# Patient Record
Sex: MALE | Race: WHITE | NOT HISPANIC OR LATINO | Employment: FULL TIME | URBAN - METROPOLITAN AREA
[De-identification: names, ages, dates, MRNs, and addresses within clinical notes are randomized per-mention and may not be internally consistent; named-entity substitution may affect disease eponyms.]

---

## 2017-03-30 ENCOUNTER — APPOINTMENT (OUTPATIENT)
Dept: CT IMAGING | Facility: HOSPITAL | Age: 47
End: 2017-03-30

## 2017-03-30 ENCOUNTER — HOSPITAL ENCOUNTER (OUTPATIENT)
Facility: HOSPITAL | Age: 47
Setting detail: OBSERVATION
Discharge: HOME | End: 2017-03-31
Attending: EMERGENCY MEDICINE | Admitting: INTERNAL MEDICINE

## 2017-03-30 DIAGNOSIS — R56.9 SEIZURE (HCC): Primary | ICD-10-CM

## 2017-03-30 DIAGNOSIS — E16.2 HYPOGLYCEMIA: ICD-10-CM

## 2017-03-30 LAB
ALBUMIN SERPL-MCNC: 4.4 G/DL (ref 3.5–5.2)
ALBUMIN/GLOB SERPL: 1.2 G/DL
ALP SERPL-CCNC: 84 U/L (ref 39–117)
ALT SERPL W P-5'-P-CCNC: 132 U/L (ref 1–41)
ANION GAP SERPL CALCULATED.3IONS-SCNC: 22.7 MMOL/L
AST SERPL-CCNC: 91 U/L (ref 1–40)
BASOPHILS # BLD AUTO: 0.02 10*3/MM3 (ref 0–0.2)
BASOPHILS NFR BLD AUTO: 0.2 % (ref 0–1.5)
BILIRUB SERPL-MCNC: 0.2 MG/DL (ref 0.1–1.2)
BILIRUB UR QL STRIP: NEGATIVE
BUN BLD-MCNC: 17 MG/DL (ref 6–20)
BUN/CREAT SERPL: 16.8 (ref 7–25)
CALCIUM SPEC-SCNC: 9.3 MG/DL (ref 8.6–10.5)
CHLORIDE SERPL-SCNC: 101 MMOL/L (ref 98–107)
CLARITY UR: CLEAR
CO2 SERPL-SCNC: 19.3 MMOL/L (ref 22–29)
COLOR UR: YELLOW
CREAT BLD-MCNC: 1.01 MG/DL (ref 0.76–1.27)
DEPRECATED RDW RBC AUTO: 45.8 FL (ref 37–54)
EOSINOPHIL # BLD AUTO: 0.2 10*3/MM3 (ref 0–0.7)
EOSINOPHIL NFR BLD AUTO: 2.3 % (ref 0.3–6.2)
ERYTHROCYTE [DISTWIDTH] IN BLOOD BY AUTOMATED COUNT: 12.8 % (ref 11.5–14.5)
GFR SERPL CREATININE-BSD FRML MDRD: 80 ML/MIN/1.73
GLOBULIN UR ELPH-MCNC: 3.6 GM/DL
GLUCOSE BLD-MCNC: 127 MG/DL (ref 65–99)
GLUCOSE BLDC GLUCOMTR-MCNC: 143 MG/DL (ref 70–130)
GLUCOSE BLDC GLUCOMTR-MCNC: 164 MG/DL (ref 70–130)
GLUCOSE BLDC GLUCOMTR-MCNC: 56 MG/DL (ref 70–130)
GLUCOSE BLDC GLUCOMTR-MCNC: 80 MG/DL (ref 70–130)
GLUCOSE BLDC GLUCOMTR-MCNC: 89 MG/DL (ref 70–130)
GLUCOSE UR STRIP-MCNC: ABNORMAL MG/DL
HCT VFR BLD AUTO: 44.3 % (ref 40.4–52.2)
HGB BLD-MCNC: 14.7 G/DL (ref 13.7–17.6)
HGB UR QL STRIP.AUTO: NEGATIVE
HOLD SPECIMEN: NORMAL
HOLD SPECIMEN: NORMAL
IMM GRANULOCYTES # BLD: 0.02 10*3/MM3 (ref 0–0.03)
IMM GRANULOCYTES NFR BLD: 0.2 % (ref 0–0.5)
KETONES UR QL STRIP: NEGATIVE
LEUKOCYTE ESTERASE UR QL STRIP.AUTO: NEGATIVE
LYMPHOCYTES # BLD AUTO: 2.49 10*3/MM3 (ref 0.9–4.8)
LYMPHOCYTES NFR BLD AUTO: 29 % (ref 19.6–45.3)
MAGNESIUM SERPL-MCNC: 2.6 MG/DL (ref 1.6–2.6)
MCH RBC QN AUTO: 32.7 PG (ref 27–32.7)
MCHC RBC AUTO-ENTMCNC: 33.2 G/DL (ref 32.6–36.4)
MCV RBC AUTO: 98.4 FL (ref 79.8–96.2)
MONOCYTES # BLD AUTO: 0.41 10*3/MM3 (ref 0.2–1.2)
MONOCYTES NFR BLD AUTO: 4.8 % (ref 5–12)
NEUTROPHILS # BLD AUTO: 5.45 10*3/MM3 (ref 1.9–8.1)
NEUTROPHILS NFR BLD AUTO: 63.5 % (ref 42.7–76)
NITRITE UR QL STRIP: NEGATIVE
PH UR STRIP.AUTO: 5.5 [PH] (ref 5–8)
PLATELET # BLD AUTO: 259 10*3/MM3 (ref 140–500)
PMV BLD AUTO: 9.9 FL (ref 6–12)
POTASSIUM BLD-SCNC: 3.8 MMOL/L (ref 3.5–5.2)
PROT SERPL-MCNC: 8 G/DL (ref 6–8.5)
PROT UR QL STRIP: ABNORMAL
RBC # BLD AUTO: 4.5 10*6/MM3 (ref 4.6–6)
SODIUM BLD-SCNC: 143 MMOL/L (ref 136–145)
SP GR UR STRIP: 1.02 (ref 1–1.03)
UROBILINOGEN UR QL STRIP: ABNORMAL
WBC NRBC COR # BLD: 8.59 10*3/MM3 (ref 4.5–10.7)
WHOLE BLOOD HOLD SPECIMEN: NORMAL
WHOLE BLOOD HOLD SPECIMEN: NORMAL

## 2017-03-30 PROCEDURE — 83735 ASSAY OF MAGNESIUM: CPT | Performed by: EMERGENCY MEDICINE

## 2017-03-30 PROCEDURE — 99284 EMERGENCY DEPT VISIT MOD MDM: CPT

## 2017-03-30 PROCEDURE — G0378 HOSPITAL OBSERVATION PER HR: HCPCS

## 2017-03-30 PROCEDURE — 80053 COMPREHEN METABOLIC PANEL: CPT | Performed by: EMERGENCY MEDICINE

## 2017-03-30 PROCEDURE — 93005 ELECTROCARDIOGRAM TRACING: CPT | Performed by: EMERGENCY MEDICINE

## 2017-03-30 PROCEDURE — 85025 COMPLETE CBC W/AUTO DIFF WBC: CPT | Performed by: EMERGENCY MEDICINE

## 2017-03-30 PROCEDURE — 82962 GLUCOSE BLOOD TEST: CPT

## 2017-03-30 PROCEDURE — 81003 URINALYSIS AUTO W/O SCOPE: CPT | Performed by: EMERGENCY MEDICINE

## 2017-03-30 PROCEDURE — 70450 CT HEAD/BRAIN W/O DYE: CPT

## 2017-03-30 PROCEDURE — 96374 THER/PROPH/DIAG INJ IV PUSH: CPT

## 2017-03-30 PROCEDURE — 96376 TX/PRO/DX INJ SAME DRUG ADON: CPT

## 2017-03-30 PROCEDURE — 93010 ELECTROCARDIOGRAM REPORT: CPT | Performed by: INTERNAL MEDICINE

## 2017-03-30 RX ORDER — INSULIN GLARGINE 100 [IU]/ML
38 INJECTION, SOLUTION SUBCUTANEOUS
COMMUNITY
End: 2021-05-12 | Stop reason: CLARIF

## 2017-03-30 RX ORDER — DEXTROSE MONOHYDRATE 25 G/50ML
25 INJECTION, SOLUTION INTRAVENOUS ONCE
Status: COMPLETED | OUTPATIENT
Start: 2017-03-30 | End: 2017-03-30

## 2017-03-30 RX ORDER — LISINOPRIL 40 MG/1
40 TABLET ORAL
Status: DISCONTINUED | OUTPATIENT
Start: 2017-03-31 | End: 2017-03-31 | Stop reason: HOSPADM

## 2017-03-30 RX ORDER — ACETAMINOPHEN 500 MG
500 TABLET ORAL EVERY 6 HOURS PRN
Status: DISCONTINUED | OUTPATIENT
Start: 2017-03-30 | End: 2017-03-31 | Stop reason: HOSPADM

## 2017-03-30 RX ORDER — DEXTROSE MONOHYDRATE 25 G/50ML
INJECTION, SOLUTION INTRAVENOUS
Status: COMPLETED
Start: 2017-03-30 | End: 2017-03-30

## 2017-03-30 RX ORDER — ACETAMINOPHEN 500 MG
1000 TABLET ORAL EVERY 6 HOURS PRN
Status: DISCONTINUED | OUTPATIENT
Start: 2017-03-30 | End: 2017-03-31 | Stop reason: HOSPADM

## 2017-03-30 RX ORDER — TRAZODONE HYDROCHLORIDE 50 MG/1
25 TABLET ORAL NIGHTLY
Status: DISCONTINUED | OUTPATIENT
Start: 2017-03-30 | End: 2017-03-31

## 2017-03-30 RX ORDER — DEXTROSE AND SODIUM CHLORIDE 5; .45 G/100ML; G/100ML
75 INJECTION, SOLUTION INTRAVENOUS CONTINUOUS
Status: DISCONTINUED | OUTPATIENT
Start: 2017-03-30 | End: 2017-03-31

## 2017-03-30 RX ORDER — EZETIMIBE 10 MG/1
10 TABLET ORAL DAILY
COMMUNITY
End: 2018-08-31

## 2017-03-30 RX ORDER — MONTELUKAST SODIUM 10 MG/1
10 TABLET ORAL NIGHTLY
Status: DISCONTINUED | OUTPATIENT
Start: 2017-03-30 | End: 2017-03-31 | Stop reason: HOSPADM

## 2017-03-30 RX ORDER — ESCITALOPRAM OXALATE 20 MG/1
20 TABLET ORAL DAILY
Status: DISCONTINUED | OUTPATIENT
Start: 2017-03-31 | End: 2017-03-31 | Stop reason: HOSPADM

## 2017-03-30 RX ADMIN — ACETAMINOPHEN 1000 MG: 500 TABLET ORAL at 22:00

## 2017-03-30 RX ADMIN — DEXTROSE MONOHYDRATE 25 G: 25 INJECTION, SOLUTION INTRAVENOUS at 17:59

## 2017-03-30 RX ADMIN — DEXTROSE AND SODIUM CHLORIDE 75 ML/HR: 5; 450 INJECTION, SOLUTION INTRAVENOUS at 19:35

## 2017-03-30 RX ADMIN — DEXTROSE MONOHYDRATE 25 G: 25 INJECTION, SOLUTION INTRAVENOUS at 19:09

## 2017-03-31 VITALS
HEART RATE: 88 BPM | WEIGHT: 243.7 LBS | HEIGHT: 74 IN | SYSTOLIC BLOOD PRESSURE: 132 MMHG | BODY MASS INDEX: 31.28 KG/M2 | RESPIRATION RATE: 20 BRPM | OXYGEN SATURATION: 96 % | TEMPERATURE: 98.3 F | DIASTOLIC BLOOD PRESSURE: 82 MMHG

## 2017-03-31 PROBLEM — E16.2 HYPOGLYCEMIA: Status: ACTIVE | Noted: 2017-03-31

## 2017-03-31 LAB
ANION GAP SERPL CALCULATED.3IONS-SCNC: 13.9 MMOL/L
BUN BLD-MCNC: 17 MG/DL (ref 6–20)
BUN/CREAT SERPL: 15.7 (ref 7–25)
CALCIUM SPEC-SCNC: 8.9 MG/DL (ref 8.6–10.5)
CHLORIDE SERPL-SCNC: 102 MMOL/L (ref 98–107)
CO2 SERPL-SCNC: 22.1 MMOL/L (ref 22–29)
CREAT BLD-MCNC: 1.08 MG/DL (ref 0.76–1.27)
DEPRECATED RDW RBC AUTO: 44.1 FL (ref 37–54)
ERYTHROCYTE [DISTWIDTH] IN BLOOD BY AUTOMATED COUNT: 12.8 % (ref 11.5–14.5)
GFR SERPL CREATININE-BSD FRML MDRD: 74 ML/MIN/1.73
GLUCOSE BLD-MCNC: 413 MG/DL (ref 65–99)
GLUCOSE BLDC GLUCOMTR-MCNC: 358 MG/DL (ref 70–130)
GLUCOSE BLDC GLUCOMTR-MCNC: 369 MG/DL (ref 70–130)
HBA1C MFR BLD: 7.39 % (ref 4.8–5.6)
HCT VFR BLD AUTO: 43.2 % (ref 40.4–52.2)
HGB BLD-MCNC: 14.3 G/DL (ref 13.7–17.6)
MCH RBC QN AUTO: 31.8 PG (ref 27–32.7)
MCHC RBC AUTO-ENTMCNC: 33.1 G/DL (ref 32.6–36.4)
MCV RBC AUTO: 96 FL (ref 79.8–96.2)
PLATELET # BLD AUTO: 262 10*3/MM3 (ref 140–500)
PMV BLD AUTO: 9.3 FL (ref 6–12)
POTASSIUM BLD-SCNC: 4.8 MMOL/L (ref 3.5–5.2)
RBC # BLD AUTO: 4.5 10*6/MM3 (ref 4.6–6)
SODIUM BLD-SCNC: 138 MMOL/L (ref 136–145)
WBC NRBC COR # BLD: 11.62 10*3/MM3 (ref 4.5–10.7)

## 2017-03-31 PROCEDURE — 85027 COMPLETE CBC AUTOMATED: CPT | Performed by: INTERNAL MEDICINE

## 2017-03-31 PROCEDURE — 83036 HEMOGLOBIN GLYCOSYLATED A1C: CPT | Performed by: INTERNAL MEDICINE

## 2017-03-31 PROCEDURE — 63710000001 INSULIN ASPART PER 5 UNITS: Performed by: INTERNAL MEDICINE

## 2017-03-31 PROCEDURE — G0378 HOSPITAL OBSERVATION PER HR: HCPCS

## 2017-03-31 PROCEDURE — 82962 GLUCOSE BLOOD TEST: CPT

## 2017-03-31 PROCEDURE — 80048 BASIC METABOLIC PNL TOTAL CA: CPT | Performed by: INTERNAL MEDICINE

## 2017-03-31 RX ORDER — TRAZODONE HYDROCHLORIDE 50 MG/1
50 TABLET ORAL NIGHTLY
Status: DISCONTINUED | OUTPATIENT
Start: 2017-03-31 | End: 2017-03-31

## 2017-03-31 RX ORDER — TRAZODONE HYDROCHLORIDE 50 MG/1
50 TABLET ORAL NIGHTLY
Status: DISCONTINUED | OUTPATIENT
Start: 2017-03-31 | End: 2017-03-31 | Stop reason: HOSPADM

## 2017-03-31 RX ORDER — TRAZODONE HYDROCHLORIDE 50 MG/1
50 TABLET ORAL NIGHTLY
Status: DISCONTINUED | OUTPATIENT
Start: 2017-03-31 | End: 2017-03-31 | Stop reason: SDUPTHER

## 2017-03-31 RX ORDER — INSULIN GLARGINE 100 [IU]/ML
38 INJECTION, SOLUTION SUBCUTANEOUS
Status: DISCONTINUED | OUTPATIENT
Start: 2017-03-31 | End: 2017-03-31 | Stop reason: HOSPADM

## 2017-03-31 RX ORDER — TRAZODONE HYDROCHLORIDE 50 MG/1
25 TABLET ORAL NIGHTLY
Status: DISCONTINUED | OUTPATIENT
Start: 2017-03-31 | End: 2017-03-31 | Stop reason: HOSPADM

## 2017-03-31 RX ORDER — EZETIMIBE 10 MG/1
10 TABLET ORAL DAILY
Status: DISCONTINUED | OUTPATIENT
Start: 2017-03-31 | End: 2017-03-31 | Stop reason: HOSPADM

## 2017-03-31 RX ADMIN — INSULIN ASPART 35 UNITS: 100 INJECTION, SOLUTION INTRAVENOUS; SUBCUTANEOUS at 11:25

## 2017-03-31 RX ADMIN — INSULIN ASPART 35 UNITS: 100 INJECTION, SOLUTION INTRAVENOUS; SUBCUTANEOUS at 08:21

## 2017-03-31 RX ADMIN — MONTELUKAST 10 MG: 10 TABLET, FILM COATED ORAL at 00:50

## 2017-03-31 RX ADMIN — ESCITALOPRAM 20 MG: 20 TABLET, FILM COATED ORAL at 08:21

## 2017-03-31 RX ADMIN — TRAZODONE HYDROCHLORIDE 50 MG: 50 TABLET, FILM COATED ORAL at 00:51

## 2017-03-31 RX ADMIN — LISINOPRIL 40 MG: 40 TABLET ORAL at 08:21

## 2017-04-18 ENCOUNTER — OFFICE VISIT (OUTPATIENT)
Dept: GASTROENTEROLOGY | Facility: CLINIC | Age: 47
End: 2017-04-18

## 2017-04-18 VITALS
DIASTOLIC BLOOD PRESSURE: 84 MMHG | BODY MASS INDEX: 29.9 KG/M2 | SYSTOLIC BLOOD PRESSURE: 130 MMHG | HEIGHT: 74 IN | WEIGHT: 233 LBS

## 2017-04-18 DIAGNOSIS — B18.2 CHRONIC HEPATITIS C WITHOUT HEPATIC COMA (HCC): ICD-10-CM

## 2017-04-18 DIAGNOSIS — R10.13 EPIGASTRIC PAIN: Primary | ICD-10-CM

## 2017-04-18 DIAGNOSIS — K59.00 CONSTIPATION, UNSPECIFIED CONSTIPATION TYPE: ICD-10-CM

## 2017-04-18 PROBLEM — K59.09 CHRONIC CONSTIPATION: Status: ACTIVE | Noted: 2017-04-18

## 2017-04-18 PROBLEM — E11.9 TYPE 2 DIABETES MELLITUS WITHOUT COMPLICATION (HCC): Status: ACTIVE | Noted: 2017-04-18

## 2017-04-18 PROCEDURE — 99204 OFFICE O/P NEW MOD 45 MIN: CPT | Performed by: INTERNAL MEDICINE

## 2017-04-18 RX ORDER — DOCUSATE SODIUM 100 MG/1
100 CAPSULE, LIQUID FILLED ORAL DAILY
COMMUNITY
End: 2018-08-31

## 2017-04-18 RX ORDER — SODIUM CHLORIDE 0.9 % (FLUSH) 0.9 %
1-10 SYRINGE (ML) INJECTION AS NEEDED
Status: CANCELLED | OUTPATIENT
Start: 2017-04-18

## 2017-04-18 RX ORDER — SODIUM CHLORIDE, SODIUM LACTATE, POTASSIUM CHLORIDE, CALCIUM CHLORIDE 600; 310; 30; 20 MG/100ML; MG/100ML; MG/100ML; MG/100ML
30 INJECTION, SOLUTION INTRAVENOUS CONTINUOUS
Status: CANCELLED | OUTPATIENT
Start: 2017-04-18

## 2017-04-18 RX ORDER — MULTIPLE VITAMINS W/ MINERALS TAB 9MG-400MCG
1 TAB ORAL DAILY
COMMUNITY

## 2017-04-18 NOTE — PROGRESS NOTES
Chief Complaint   Patient presents with   • Hepatitis C     Subjective      HPI     Luis Quintana is a 46 y.o. male who presents For evaluation for treatment of his chronic hepatitis C.  Apparently this gentleman was diagnosed with hepatitis C over 10 years ago.  Method of acquisition is unclear.  Pt denies any hx of IVDA or homemade tatoos.  He was born in [place] and migrated to [place] in 1998.  He was previously evaluated for therapy with interferon and ribavarin but declined at the time as he had F0 fibrosis and was under the understanding that new agents would eventually come out.  He was recently seen and evaluated for treatment with Harvoni by Dr. Liriano but for unclear reasons this was never started.  I have reviewed records sent over from Dr. Liriano's office which shows that the patient is genotype 1B.  He did have a HCV fibro-sure some performed which showed F2 3 fibrosis and a fibro-scan which showed F2 fibrosis.  He has not had an abdominal ultrasound in the past.  There is also mention of a history of latent hepatitis B but I do not have any hepatitis B serology available to review.    IN addition to discussion about the patient's hx of HCV, he does mention some other GI related issues.  He reports intermittent epigastric pain over the last 2-3 years.  Pain is located predominately in the mid to right upper abdomen.  Sometimes associated with nausea but no vomiting.  No clear exacerbating or relieving factors.  The patient did undergo an upper GI in 2015 which showed evidence of a sliding hiatal hernia with some gastroesophageal reflux and suspected mild esophagitis.  He has never had upper endoscopic evaluation.  He does report maternal uncle and grandfather with gastric cancer.  He also complains of intermittent constipation with some straining.  He takes regular colace and uses preparation H cream as needed for hemorrhoids.  He has never had lower endoscopic evaluation.      Past Medical History:    Diagnosis Date   • Diabetes mellitus    • Hemorrhoids    • Hyperlipidemia    • Hypertension    • Infectious viral hepatitis     Hep C       Current Outpatient Prescriptions:   •  docusate sodium (COLACE) 100 MG capsule, Take 100 mg by mouth Daily., Disp: , Rfl:   •  escitalopram (LEXAPRO) 20 MG tablet, Take 20 mg by mouth Daily., Disp: , Rfl:   •  ezetimibe (ZETIA) 10 MG tablet, Take 10 mg by mouth Daily., Disp: , Rfl:   •  insulin glargine (LANTUS) 100 UNIT/ML injection, Inject 38 Units under the skin Daily Before Supper., Disp: , Rfl:   •  Insulin Lispro (HUMALOG SC), Inject 35 Units under the skin 3 (Three) Times a Day With Meals. Takes 33-35 units 3 (three) times daily. Dose depends on food intake and exercise., Disp: , Rfl:   •  lisinopril (PRINIVIL,ZESTRIL) 40 MG tablet, Take 40 mg by mouth Daily., Disp: , Rfl:   •  montelukast (SINGULAIR) 10 MG tablet, Take 10 mg by mouth Every Night., Disp: , Rfl:   •  Multiple Vitamins-Minerals (MULTIVITAMIN WITH MINERALS) tablet tablet, Take 1 tablet by mouth Daily., Disp: , Rfl:   •  traZODone (DESYREL) 50 MG tablet, Take 50 mg by mouth Every Night. Takes 25 mg (1/2 tab) to 50 mg (1 tab) nightly at bedtime., Disp: , Rfl:      Allergies   Allergen Reactions   • Penicillins    • Sulfa Antibiotics Rash     Reaction happened when patient was a child. He thinks that he developed a rash but cannot definitively remember.      Social History     Social History   • Marital status: Single     Spouse name: N/A   • Number of children: N/A   • Years of education: N/A     Occupational History   • Not on file.     Social History Main Topics   • Smoking status: Former Smoker     Types: Cigarettes     Quit date: 2014   • Smokeless tobacco: Never Used   • Alcohol use Yes      Comment: occassional    • Drug use: No   • Sexual activity: Defer     Other Topics Concern   • Not on file     Social History Narrative     Family History   Problem Relation Age of Onset   • Arrhythmia Mother       Review of Systems   Gastrointestinal: Positive for abdominal pain and constipation.   All other systems reviewed and are negative.       Objective   Vitals:    04/18/17 1101   BP: 130/84     Physical Exam   Constitutional: He is oriented to person, place, and time. He appears well-developed and well-nourished.   HENT:   Head: Normocephalic and atraumatic.   Mouth/Throat: Oropharynx is clear and moist.   Eyes: Conjunctivae are normal. No scleral icterus.   Neck: Normal range of motion. Neck supple. No thyromegaly present.   Cardiovascular: Normal rate and regular rhythm.  Exam reveals no friction rub.    No murmur heard.  Pulmonary/Chest: Effort normal and breath sounds normal. No respiratory distress. He has no wheezes. He has no rales. He exhibits no tenderness.   Abdominal: Soft. Bowel sounds are normal. He exhibits no distension and no mass. There is no tenderness. There is no rebound and no guarding. No hernia.   Musculoskeletal: He exhibits no edema.   Lymphadenopathy:     He has no cervical adenopathy.     He has no axillary adenopathy.   Neurological: He is alert and oriented to person, place, and time.   Skin: Skin is warm and dry. No rash noted.   Psychiatric: He has a normal mood and affect. His behavior is normal. Judgment and thought content normal.   Vitals reviewed.       Assessment/Plan      Luis was seen today for hepatitis c.    Diagnoses and all orders for this visit:    Epigastric pain  -     Case Request for EGD    Constipation, unspecified constipation type  -     Case Request for colonoscopy  -     Continue colace and fiber supplements    Chronic hepatitis C without hepatic coma  -     Hepatitis B Core Antibody, Total  -     Hepatitis B Surface Antibody  -     Hepatitis B Surface Antigen  -     CBC & Differential  -     Comprehensive Metabolic Panel  -     Protime-INR  -     Hepatitis C RNA, Quantitative, PCR (graph)  -     Ferritin  -     Iron Profile  -     Hepatitis B DNA, Quantitative,  PCR  -     US Liver; Future    We'll arrange for EGD and colonoscopy for evaluation of the patient's upper abdominal pain in his chronic but worsening constipation.  Labs and abdominal u/s as per above for further evaluation the patient's chronic hepatitis C with anticipation of starting the patient on Harvoni - [id number] weeks for treatment naïve genotype 1B HCV.  We discussed treatment with Harvoni in detail including common side effects such as headache and nausea, and more serious side effects such as decompensation of his liver disease.            Lupillo Patino M.D.  [facility], [place], operated by [facility]  [address] [place] [address]  Office: [phone]

## 2017-04-20 LAB
ALBUMIN SERPL-MCNC: 4.5 G/DL (ref 3.5–5.2)
ALBUMIN/GLOB SERPL: 1.4 G/DL
ALP SERPL-CCNC: 85 U/L (ref 39–117)
ALT SERPL-CCNC: 147 U/L (ref 1–41)
AST SERPL-CCNC: 123 U/L (ref 1–40)
BASOPHILS # BLD AUTO: 0.02 10*3/MM3 (ref 0–0.2)
BASOPHILS NFR BLD AUTO: 0.3 % (ref 0–1.5)
BILIRUB SERPL-MCNC: 0.7 MG/DL (ref 0.1–1.2)
BUN SERPL-MCNC: 11 MG/DL (ref 6–20)
BUN/CREAT SERPL: 12 (ref 7–25)
CALCIUM SERPL-MCNC: 9.9 MG/DL (ref 8.6–10.5)
CHLORIDE SERPL-SCNC: 97 MMOL/L (ref 98–107)
CO2 SERPL-SCNC: 27 MMOL/L (ref 22–29)
CREAT SERPL-MCNC: 0.92 MG/DL (ref 0.76–1.27)
EOSINOPHIL # BLD AUTO: 0.22 10*3/MM3 (ref 0–0.7)
EOSINOPHIL NFR BLD AUTO: 3.5 % (ref 0.3–6.2)
ERYTHROCYTE [DISTWIDTH] IN BLOOD BY AUTOMATED COUNT: 13 % (ref 11.5–14.5)
FERRITIN SERPL-MCNC: 375.3 NG/ML (ref 30–400)
GLOBULIN SER CALC-MCNC: 3.3 GM/DL
GLUCOSE SERPL-MCNC: 142 MG/DL (ref 65–99)
HBV CORE AB SERPL QL IA: POSITIVE
HBV DNA SERPL NAA+PROBE-ACNC: NORMAL IU/ML
HBV DNA SERPL NAA+PROBE-LOG IU: NORMAL LOG10IU/ML
HBV SURFACE AB SER QL: REACTIVE
HBV SURFACE AG SERPL QL IA: NEGATIVE
HCT VFR BLD AUTO: 43.6 % (ref 40.4–52.2)
HCV RNA SERPL NAA+PROBE-ACNC: NORMAL IU/ML
HCV RNA SERPL NAA+PROBE-LOG IU: 6.83 LOG10 IU/ML
HGB BLD-MCNC: 14.8 G/DL (ref 13.7–17.6)
IMM GRANULOCYTES # BLD: 0 10*3/MM3 (ref 0–0.03)
IMM GRANULOCYTES NFR BLD: 0 % (ref 0–0.5)
INR PPP: 1.02 (ref 0.9–1.1)
IRON SATN MFR SERPL: 46 % (ref 20–50)
IRON SERPL-MCNC: 190 MCG/DL (ref 59–158)
LYMPHOCYTES # BLD AUTO: 2.21 10*3/MM3 (ref 0.9–4.8)
LYMPHOCYTES NFR BLD AUTO: 35.2 % (ref 19.6–45.3)
MCH RBC QN AUTO: 33 PG (ref 27–32.7)
MCHC RBC AUTO-ENTMCNC: 33.9 G/DL (ref 32.6–36.4)
MCV RBC AUTO: 97.3 FL (ref 79.8–96.2)
MONOCYTES # BLD AUTO: 0.45 10*3/MM3 (ref 0.2–1.2)
MONOCYTES NFR BLD AUTO: 7.2 % (ref 5–12)
NEUTROPHILS # BLD AUTO: 3.38 10*3/MM3 (ref 1.9–8.1)
NEUTROPHILS NFR BLD AUTO: 53.8 % (ref 42.7–76)
PLATELET # BLD AUTO: 250 10*3/MM3 (ref 140–500)
POTASSIUM SERPL-SCNC: 4.2 MMOL/L (ref 3.5–5.2)
PROT SERPL-MCNC: 7.8 G/DL (ref 6–8.5)
PROTHROMBIN TIME: 13 SECONDS (ref 11.7–14.2)
RBC # BLD AUTO: 4.48 10*6/MM3 (ref 4.6–6)
REF LAB TEST REF RANGE: NORMAL
SODIUM SERPL-SCNC: 140 MMOL/L (ref 136–145)
TEST INFORMATION: NORMAL
TIBC SERPL-MCNC: 410 MCG/DL
UIBC SERPL-MCNC: 220 MCG/DL
WBC # BLD AUTO: 6.28 10*3/MM3 (ref 4.5–10.7)

## 2017-04-27 ENCOUNTER — HOSPITAL ENCOUNTER (OUTPATIENT)
Facility: HOSPITAL | Age: 47
Setting detail: HOSPITAL OUTPATIENT SURGERY
Discharge: HOME | End: 2017-04-27
Attending: INTERNAL MEDICINE | Admitting: INTERNAL MEDICINE

## 2017-04-27 ENCOUNTER — HOSPITAL ENCOUNTER (OUTPATIENT)
Dept: ULTRASOUND IMAGING | Facility: HOSPITAL | Age: 47
Discharge: HOME | End: 2017-04-27
Attending: INTERNAL MEDICINE

## 2017-04-27 ENCOUNTER — ANESTHESIA (OUTPATIENT)
Dept: GASTROENTEROLOGY | Facility: HOSPITAL | Age: 47
End: 2017-04-27

## 2017-04-27 ENCOUNTER — ANESTHESIA EVENT (OUTPATIENT)
Dept: GASTROENTEROLOGY | Facility: HOSPITAL | Age: 47
End: 2017-04-27

## 2017-04-27 VITALS
DIASTOLIC BLOOD PRESSURE: 62 MMHG | HEART RATE: 79 BPM | TEMPERATURE: 98.6 F | OXYGEN SATURATION: 97 % | RESPIRATION RATE: 16 BRPM | SYSTOLIC BLOOD PRESSURE: 111 MMHG | BODY MASS INDEX: 28.43 KG/M2 | WEIGHT: 221.44 LBS

## 2017-04-27 DIAGNOSIS — K59.00 CONSTIPATION, UNSPECIFIED CONSTIPATION TYPE: ICD-10-CM

## 2017-04-27 DIAGNOSIS — R10.13 EPIGASTRIC PAIN: ICD-10-CM

## 2017-04-27 LAB — GLUCOSE BLDC GLUCOMTR-MCNC: 192 MG/DL (ref 70–130)

## 2017-04-27 PROCEDURE — 82962 GLUCOSE BLOOD TEST: CPT

## 2017-04-27 PROCEDURE — 45385 COLONOSCOPY W/LESION REMOVAL: CPT | Performed by: INTERNAL MEDICINE

## 2017-04-27 PROCEDURE — 76705 ECHO EXAM OF ABDOMEN: CPT

## 2017-04-27 PROCEDURE — 43239 EGD BIOPSY SINGLE/MULTIPLE: CPT | Performed by: INTERNAL MEDICINE

## 2017-04-27 PROCEDURE — 88305 TISSUE EXAM BY PATHOLOGIST: CPT | Performed by: INTERNAL MEDICINE

## 2017-04-27 PROCEDURE — 25010000002 PROPOFOL 10 MG/ML EMULSION: Performed by: ANESTHESIOLOGY

## 2017-04-27 PROCEDURE — 45380 COLONOSCOPY AND BIOPSY: CPT | Performed by: INTERNAL MEDICINE

## 2017-04-27 PROCEDURE — 88312 SPECIAL STAINS GROUP 1: CPT | Performed by: INTERNAL MEDICINE

## 2017-04-27 DEVICE — CLIPPING DEVICE
Type: IMPLANTABLE DEVICE | Site: CECUM | Status: FUNCTIONAL
Brand: RESOLUTION CLIP

## 2017-04-27 RX ORDER — LIDOCAINE HYDROCHLORIDE 20 MG/ML
INJECTION, SOLUTION INFILTRATION; PERINEURAL AS NEEDED
Status: DISCONTINUED | OUTPATIENT
Start: 2017-04-27 | End: 2017-04-27 | Stop reason: SURG

## 2017-04-27 RX ORDER — PANTOPRAZOLE SODIUM 40 MG/1
40 TABLET, DELAYED RELEASE ORAL DAILY
Qty: 30 TABLET | Refills: 5 | Status: SHIPPED | OUTPATIENT
Start: 2017-04-27 | End: 2017-06-07

## 2017-04-27 RX ORDER — PROPOFOL 10 MG/ML
VIAL (ML) INTRAVENOUS AS NEEDED
Status: DISCONTINUED | OUTPATIENT
Start: 2017-04-27 | End: 2017-04-27 | Stop reason: SURG

## 2017-04-27 RX ORDER — SODIUM CHLORIDE 0.9 % (FLUSH) 0.9 %
1-10 SYRINGE (ML) INJECTION AS NEEDED
Status: DISCONTINUED | OUTPATIENT
Start: 2017-04-27 | End: 2017-04-27 | Stop reason: HOSPADM

## 2017-04-27 RX ORDER — SODIUM CHLORIDE, SODIUM LACTATE, POTASSIUM CHLORIDE, CALCIUM CHLORIDE 600; 310; 30; 20 MG/100ML; MG/100ML; MG/100ML; MG/100ML
30 INJECTION, SOLUTION INTRAVENOUS CONTINUOUS
Status: DISCONTINUED | OUTPATIENT
Start: 2017-04-27 | End: 2017-04-27 | Stop reason: HOSPADM

## 2017-04-27 RX ORDER — PROPOFOL 10 MG/ML
VIAL (ML) INTRAVENOUS CONTINUOUS PRN
Status: DISCONTINUED | OUTPATIENT
Start: 2017-04-27 | End: 2017-04-27 | Stop reason: SURG

## 2017-04-27 RX ADMIN — PROPOFOL 50 MG: 10 INJECTION, EMULSION INTRAVENOUS at 12:54

## 2017-04-27 RX ADMIN — LIDOCAINE HYDROCHLORIDE 40 MG: 20 INJECTION, SOLUTION INFILTRATION; PERINEURAL at 12:54

## 2017-04-27 RX ADMIN — SODIUM CHLORIDE, POTASSIUM CHLORIDE, SODIUM LACTATE AND CALCIUM CHLORIDE 30 ML/HR: 600; 310; 30; 20 INJECTION, SOLUTION INTRAVENOUS at 12:47

## 2017-04-27 RX ADMIN — SODIUM CHLORIDE, POTASSIUM CHLORIDE, SODIUM LACTATE AND CALCIUM CHLORIDE: 600; 310; 30; 20 INJECTION, SOLUTION INTRAVENOUS at 12:54

## 2017-04-27 RX ADMIN — PROPOFOL 160 MCG/KG/MIN: 10 INJECTION, EMULSION INTRAVENOUS at 12:54

## 2017-04-27 NOTE — ANESTHESIA POSTPROCEDURE EVALUATION
Patient: Luis Quintana    Procedure Summary     Date Anesthesia Start Anesthesia Stop Room / Location    04/27/17 1249 1324  [facility] /  [facility]       Procedure Diagnosis Surgeon Provider    ESOPHAGOGASTRODUODENOSCOPY WITH BIOPSY (N/A Esophagus); COLONOSCOPY WITH POLYPECTOMY (N/A ) Epigastric pain; Constipation, unspecified constipation type  (Epigastric pain [R10.13]; Constipation, unspecified constipation type [K59.00]) MD Ambrosio Ivey MD          Anesthesia Type: MAC  Last vitals  /64 (04/27/17 1326)    Temp 37 °C (98.6 °F) (04/27/17 1242)    Pulse 74 (04/27/17 1326)   Resp 16 (04/27/17 1326)    SpO2 99 % (04/27/17 1326)      Post Anesthesia Care and Evaluation    Patient location during evaluation: PACU  Patient participation: complete - patient participated  Level of consciousness: awake and alert  Pain management: adequate  Airway patency: patent  Anesthetic complications: No anesthetic complications    Cardiovascular status: acceptable  Respiratory status: acceptable  Hydration status: acceptable

## 2017-04-27 NOTE — ANESTHESIA PREPROCEDURE EVALUATION
Anesthesia Evaluation     Patient summary reviewed and Nursing notes reviewed   NPO Status: > 8 hours   Airway   Mallampati: II  TM distance: <3 FB  Neck ROM: full  no difficulty expected  Dental - normal exam     Pulmonary - normal exam   (+) a smoker,   Cardiovascular - normal exam    ECG reviewed    (+) hypertension,       Neuro/Psych- negative ROS  GI/Hepatic/Renal/Endo    (+)  hepatitis C, liver disease, diabetes mellitus type 1,     Musculoskeletal (-) negative ROS    Abdominal  - normal exam    Bowel sounds: normal.   Substance History - negative use     OB/GYN negative ob/gyn ROS         Other                                    Anesthesia Plan    ASA 3     MAC   total IV anesthesia  intravenous induction

## 2017-04-28 LAB
CYTO UR: NORMAL
LAB AP CASE REPORT: NORMAL
Lab: NORMAL
PATH REPORT.FINAL DX SPEC: NORMAL
PATH REPORT.GROSS SPEC: NORMAL

## 2017-05-08 ENCOUNTER — TELEPHONE (OUTPATIENT)
Dept: GASTROENTEROLOGY | Facility: CLINIC | Age: 47
End: 2017-05-08

## 2017-05-10 ENCOUNTER — TELEPHONE (OUTPATIENT)
Dept: GASTROENTEROLOGY | Facility: CLINIC | Age: 47
End: 2017-05-10

## 2017-06-07 ENCOUNTER — OFFICE VISIT (OUTPATIENT)
Dept: GASTROENTEROLOGY | Facility: CLINIC | Age: 47
End: 2017-06-07

## 2017-06-07 VITALS
BODY MASS INDEX: 29.18 KG/M2 | SYSTOLIC BLOOD PRESSURE: 112 MMHG | HEIGHT: 74 IN | DIASTOLIC BLOOD PRESSURE: 70 MMHG | TEMPERATURE: 98 F | WEIGHT: 227.4 LBS

## 2017-06-07 DIAGNOSIS — B18.2 CHRONIC HEPATITIS C WITHOUT HEPATIC COMA (HCC): Primary | ICD-10-CM

## 2017-06-07 PROCEDURE — 99213 OFFICE O/P EST LOW 20 MIN: CPT | Performed by: INTERNAL MEDICINE

## 2017-06-07 RX ORDER — HYDROCORTISONE ACETATE 25 MG/1
25 SUPPOSITORY RECTAL NIGHTLY PRN
Qty: 30 SUPPOSITORY | Refills: 1 | OUTPATIENT
Start: 2017-06-07 | End: 2018-08-31

## 2017-06-07 NOTE — PROGRESS NOTES
Chief Complaint   Patient presents with   • Hepatitis     Harvoni follow up     Subjective     HPI  Luis Quintana is a 46 y.o. male who presents Today for follow-up.  The patient is now 4 weeks into therapy with Harvoni and seems to be tolerating this quite well.  He denies any headaches nausea or abdominal pain.  He does continue to complain of some issues with hemorrhoids and he was noted to have both internal and external hemorrhoids on recent colonoscopy.  He is using a daily fiber supplement and stool softener and over-the-counter hemorrhoidal creams with little effect.  He denies any significant rectal bleeding.      Past Medical History:   Diagnosis Date   • Diabetes mellitus    • Hemorrhoids    • Hyperlipidemia    • Hypertension    • Infectious viral hepatitis     Hep C       Social History     Social History   • Marital status: Single     Spouse name: N/A   • Number of children: N/A   • Years of education: N/A     Social History Main Topics   • Smoking status: Former Smoker     Types: Cigarettes     Quit date: 2014   • Smokeless tobacco: Never Used   • Alcohol use Yes      Comment: occassional    • Drug use: No   • Sexual activity: Defer     Other Topics Concern   • None     Social History Narrative         Current Outpatient Prescriptions:   •  docusate sodium (COLACE) 100 MG capsule, Take 100 mg by mouth Daily., Disp: , Rfl:   •  escitalopram (LEXAPRO) 20 MG tablet, Take 20 mg by mouth Daily., Disp: , Rfl:   •  ezetimibe (ZETIA) 10 MG tablet, Take 10 mg by mouth Daily., Disp: , Rfl:   •  insulin glargine (LANTUS) 100 UNIT/ML injection, Inject 38 Units under the skin Daily Before Supper., Disp: , Rfl:   •  Insulin Lispro (HUMALOG SC), Inject 35 Units under the skin 3 (Three) Times a Day With Meals. Takes 33-35 units 3 (three) times daily. Dose depends on food intake and exercise., Disp: , Rfl:   •  lisinopril (PRINIVIL,ZESTRIL) 40 MG tablet, Take 40 mg by mouth Daily., Disp: , Rfl:   •  montelukast  (SINGULAIR) 10 MG tablet, Take 10 mg by mouth Every Night., Disp: , Rfl:   •  Multiple Vitamins-Minerals (MULTIVITAMIN WITH MINERALS) tablet tablet, Take 1 tablet by mouth Daily., Disp: , Rfl:   •  traZODone (DESYREL) 50 MG tablet, Take 50 mg by mouth Every Night. Takes 25 mg (1/2 tab) to 50 mg (1 tab) nightly at bedtime., Disp: , Rfl:   •  hydrocortisone (ANUSOL-HC) 25 MG suppository, Insert 1 suppository into the rectum At Night As Needed for Hemorrhoids (rectal discomfort/bleeding)., Disp: 30 suppository, Rfl: 1    Review of Systems   Constitutional: Negative.    HENT: Negative.    Respiratory: Negative.    Cardiovascular: Negative.    Gastrointestinal: Positive for rectal pain.        Hemorrhoids     Psychiatric/Behavioral: Negative.        Objective   Vitals:    06/07/17 0907   BP: 112/70   Temp: 98 °F (36.7 °C)       Physical Exam   Constitutional: He is oriented to person, place, and time. He appears well-developed and well-nourished.   HENT:   Head: Normocephalic and atraumatic.   Eyes: Pupils are equal, round, and reactive to light.   Abdominal: Soft. Bowel sounds are normal. He exhibits no distension and no mass. There is no tenderness. No hernia.   Neurological: He is alert and oriented to person, place, and time.   Skin: Skin is warm and dry.   Psychiatric: He has a normal mood and affect. His behavior is normal. Judgment and thought content normal.   Vitals reviewed.      Assessment/Plan      Luis was seen today for hepatitis.    Diagnoses and all orders for this visit:    Chronic hepatitis C without hepatic coma  -     CBC & Differential  -     Comprehensive Metabolic Panel  -     Hepatitis C RNA, Quantitative, PCR (graph)    Other orders  -     hydrocortisone (ANUSOL-HC) 25 MG suppository; Insert 1 suppository into the rectum At Night As Needed for Hemorrhoids (rectal discomfort/bleeding).      Check 4 week treatment labs for Harvoni therapy, including HCV viral load  Anusol suppository for  hemorrhoids.  If no improvement consider trial of Vasculera.  May consider referral to colorectal surgery after he has completed his HCV therapy    F/U 4 weeks        Lupillo Patino M.D.  [facility]  [address] [place] [address]  Office: [phone]

## 2017-06-09 ENCOUNTER — TELEPHONE (OUTPATIENT)
Dept: GASTROENTEROLOGY | Facility: CLINIC | Age: 47
End: 2017-06-09

## 2017-06-09 DIAGNOSIS — B18.2 HEP C W/O COMA, CHRONIC (HCC): Primary | ICD-10-CM

## 2017-06-09 LAB
ALBUMIN SERPL-MCNC: 4.7 G/DL (ref 3.5–5.2)
ALBUMIN/GLOB SERPL: 1.6 G/DL
ALP SERPL-CCNC: 68 U/L (ref 39–117)
ALT SERPL-CCNC: 22 U/L (ref 1–41)
AST SERPL-CCNC: 21 U/L (ref 1–40)
BASOPHILS # BLD AUTO: 0.02 10*3/MM3 (ref 0–0.2)
BASOPHILS NFR BLD AUTO: 0.4 % (ref 0–1.5)
BILIRUB SERPL-MCNC: 0.6 MG/DL (ref 0.1–1.2)
BUN SERPL-MCNC: 14 MG/DL (ref 6–20)
BUN/CREAT SERPL: 13.6 (ref 7–25)
CALCIUM SERPL-MCNC: 9.5 MG/DL (ref 8.6–10.5)
CHLORIDE SERPL-SCNC: 97 MMOL/L (ref 98–107)
CO2 SERPL-SCNC: 26.9 MMOL/L (ref 22–29)
CREAT SERPL-MCNC: 1.03 MG/DL (ref 0.76–1.27)
EOSINOPHIL # BLD AUTO: 0.25 10*3/MM3 (ref 0–0.7)
EOSINOPHIL NFR BLD AUTO: 4.5 % (ref 0.3–6.2)
ERYTHROCYTE [DISTWIDTH] IN BLOOD BY AUTOMATED COUNT: 12.5 % (ref 11.5–14.5)
GLOBULIN SER CALC-MCNC: 2.9 GM/DL
GLUCOSE SERPL-MCNC: 331 MG/DL (ref 65–99)
HCT VFR BLD AUTO: 44.7 % (ref 40.4–52.2)
HCV RNA SERPL NAA+PROBE-ACNC: <15 IU/ML
HGB BLD-MCNC: 14.7 G/DL (ref 13.7–17.6)
IMM GRANULOCYTES # BLD: 0 10*3/MM3 (ref 0–0.03)
IMM GRANULOCYTES NFR BLD: 0 % (ref 0–0.5)
LYMPHOCYTES # BLD AUTO: 1.96 10*3/MM3 (ref 0.9–4.8)
LYMPHOCYTES NFR BLD AUTO: 35.4 % (ref 19.6–45.3)
MCH RBC QN AUTO: 32.7 PG (ref 27–32.7)
MCHC RBC AUTO-ENTMCNC: 32.9 G/DL (ref 32.6–36.4)
MCV RBC AUTO: 99.6 FL (ref 79.8–96.2)
MONOCYTES # BLD AUTO: 0.43 10*3/MM3 (ref 0.2–1.2)
MONOCYTES NFR BLD AUTO: 7.8 % (ref 5–12)
NEUTROPHILS # BLD AUTO: 2.88 10*3/MM3 (ref 1.9–8.1)
NEUTROPHILS NFR BLD AUTO: 51.9 % (ref 42.7–76)
PLATELET # BLD AUTO: 244 10*3/MM3 (ref 140–500)
POTASSIUM SERPL-SCNC: 5.3 MMOL/L (ref 3.5–5.2)
PROT SERPL-MCNC: 7.6 G/DL (ref 6–8.5)
RBC # BLD AUTO: 4.49 10*6/MM3 (ref 4.6–6)
SODIUM SERPL-SCNC: 137 MMOL/L (ref 136–145)
TEST INFORMATION: NORMAL
WBC # BLD AUTO: 5.54 10*3/MM3 (ref 4.5–10.7)

## 2017-06-09 NOTE — TELEPHONE ENCOUNTER
----- Message from Lupillo Patino MD sent at 6/9/2017 10:55 AM EDT -----  Results communicated to patient.  HCV level essentially undetectable  Repeat CBC and CMP in 4 weeks

## 2017-07-07 ENCOUNTER — OFFICE VISIT (OUTPATIENT)
Dept: GASTROENTEROLOGY | Facility: CLINIC | Age: 47
End: 2017-07-07

## 2017-07-07 VITALS
HEIGHT: 73 IN | WEIGHT: 227.2 LBS | TEMPERATURE: 97.4 F | BODY MASS INDEX: 30.11 KG/M2 | SYSTOLIC BLOOD PRESSURE: 122 MMHG | DIASTOLIC BLOOD PRESSURE: 74 MMHG

## 2017-07-07 DIAGNOSIS — K64.9 HEMORRHOIDS, UNSPECIFIED HEMORRHOID TYPE: ICD-10-CM

## 2017-07-07 DIAGNOSIS — B18.2 CHRONIC HEPATITIS C WITHOUT HEPATIC COMA (HCC): Primary | ICD-10-CM

## 2017-07-07 DIAGNOSIS — K59.09 CHRONIC CONSTIPATION: ICD-10-CM

## 2017-07-07 LAB
ALBUMIN SERPL-MCNC: 4.6 G/DL (ref 3.5–5.2)
ALBUMIN/GLOB SERPL: 1.5 G/DL
ALP SERPL-CCNC: 88 U/L (ref 39–117)
ALT SERPL-CCNC: 24 U/L (ref 1–41)
AST SERPL-CCNC: 21 U/L (ref 1–40)
BASOPHILS # BLD AUTO: 0.02 10*3/MM3 (ref 0–0.2)
BASOPHILS NFR BLD AUTO: 0.2 % (ref 0–1.5)
BILIRUB SERPL-MCNC: <0.2 MG/DL (ref 0.1–1.2)
BUN SERPL-MCNC: 17 MG/DL (ref 6–20)
BUN/CREAT SERPL: 16.8 (ref 7–25)
CALCIUM SERPL-MCNC: 10.4 MG/DL (ref 8.6–10.5)
CHLORIDE SERPL-SCNC: 100 MMOL/L (ref 98–107)
CO2 SERPL-SCNC: 29 MMOL/L (ref 22–29)
CREAT SERPL-MCNC: 1.01 MG/DL (ref 0.76–1.27)
EOSINOPHIL # BLD AUTO: 0.5 10*3/MM3 (ref 0–0.7)
EOSINOPHIL NFR BLD AUTO: 6.1 % (ref 0.3–6.2)
ERYTHROCYTE [DISTWIDTH] IN BLOOD BY AUTOMATED COUNT: 13.3 % (ref 11.5–14.5)
GLOBULIN SER CALC-MCNC: 3.1 GM/DL
GLUCOSE SERPL-MCNC: 148 MG/DL (ref 65–99)
HCT VFR BLD AUTO: 43.4 % (ref 40.4–52.2)
HGB BLD-MCNC: 14.7 G/DL (ref 13.7–17.6)
IMM GRANULOCYTES # BLD: 0 10*3/MM3 (ref 0–0.03)
IMM GRANULOCYTES NFR BLD: 0 % (ref 0–0.5)
LYMPHOCYTES # BLD AUTO: 3.88 10*3/MM3 (ref 0.9–4.8)
LYMPHOCYTES NFR BLD AUTO: 47.5 % (ref 19.6–45.3)
MCH RBC QN AUTO: 33 PG (ref 27–32.7)
MCHC RBC AUTO-ENTMCNC: 33.9 G/DL (ref 32.6–36.4)
MCV RBC AUTO: 97.3 FL (ref 79.8–96.2)
MONOCYTES # BLD AUTO: 0.59 10*3/MM3 (ref 0.2–1.2)
MONOCYTES NFR BLD AUTO: 7.2 % (ref 5–12)
NEUTROPHILS # BLD AUTO: 3.17 10*3/MM3 (ref 1.9–8.1)
NEUTROPHILS NFR BLD AUTO: 39 % (ref 42.7–76)
PLATELET # BLD AUTO: 316 10*3/MM3 (ref 140–500)
POTASSIUM SERPL-SCNC: 5.6 MMOL/L (ref 3.5–5.2)
PROT SERPL-MCNC: 7.7 G/DL (ref 6–8.5)
RBC # BLD AUTO: 4.46 10*6/MM3 (ref 4.6–6)
SODIUM SERPL-SCNC: 142 MMOL/L (ref 136–145)
WBC # BLD AUTO: 8.16 10*3/MM3 (ref 4.5–10.7)

## 2017-07-07 PROCEDURE — 99213 OFFICE O/P EST LOW 20 MIN: CPT | Performed by: INTERNAL MEDICINE

## 2017-07-07 NOTE — PROGRESS NOTES
Chief Complaint   Patient presents with   • Follow-up     Subjective     HPI     Luis Quintana is a 46 y.o. male who presents today for follow-up of his hepatitis C.  He has just started his last 4 weeks of therapy.  He continues to tolerate the Harvoni quite well with no real side effects.  He tells me his hemorrhoids have improved with the use of the Anusol suppositories.    Past Medical History:   Diagnosis Date   • Diabetes mellitus    • Hemorrhoids    • Hyperlipidemia    • Hypertension    • Infectious viral hepatitis     Hep C       Social History     Social History   • Marital status: Single     Spouse name: N/A   • Number of children: N/A   • Years of education: N/A     Social History Main Topics   • Smoking status: Former Smoker     Types: Cigarettes     Quit date: 2014   • Smokeless tobacco: Never Used   • Alcohol use Yes      Comment: occassional    • Drug use: No   • Sexual activity: Defer     Other Topics Concern   • None     Social History Narrative         Current Outpatient Prescriptions:   •  docusate sodium (COLACE) 100 MG capsule, Take 100 mg by mouth Daily., Disp: , Rfl:   •  escitalopram (LEXAPRO) 20 MG tablet, Take 20 mg by mouth Daily., Disp: , Rfl:   •  ezetimibe (ZETIA) 10 MG tablet, Take 10 mg by mouth Daily., Disp: , Rfl:   •  hydrocortisone (ANUSOL-HC) 25 MG suppository, Insert 1 suppository into the rectum At Night As Needed for Hemorrhoids (rectal discomfort/bleeding)., Disp: 30 suppository, Rfl: 1  •  insulin glargine (LANTUS) 100 UNIT/ML injection, Inject 38 Units under the skin Daily Before Supper., Disp: , Rfl:   •  Insulin Lispro (HUMALOG SC), Inject 35 Units under the skin 3 (Three) Times a Day With Meals. Takes 33-35 units 3 (three) times daily. Dose depends on food intake and exercise., Disp: , Rfl:   •  lisinopril (PRINIVIL,ZESTRIL) 40 MG tablet, Take 40 mg by mouth Daily., Disp: , Rfl:   •  montelukast (SINGULAIR) 10 MG tablet, Take 10 mg by mouth Every Night., Disp: , Rfl:    •  Multiple Vitamins-Minerals (MULTIVITAMIN WITH MINERALS) tablet tablet, Take 1 tablet by mouth Daily., Disp: , Rfl:   •  traZODone (DESYREL) 50 MG tablet, Take 50 mg by mouth Every Night. Takes 25 mg (1/2 tab) to 50 mg (1 tab) nightly at bedtime., Disp: , Rfl:     Review of Systems   Constitutional: Negative.    Cardiovascular: Negative.    Gastrointestinal: Positive for constipation.     Objective   Vitals:    07/07/17 0947   BP: 122/74   Temp: 97.4 °F (36.3 °C)       Physical Exam   Constitutional: He is oriented to person, place, and time.   Eyes: No scleral icterus.   Abdominal: Soft. Bowel sounds are normal. He exhibits no distension. There is no tenderness.   Neurological: He is alert and oriented to person, place, and time.   Vitals reviewed.      Assessment/Plan   Assessment:     1. Chronic hepatitis C without hepatic coma    2. Chronic constipation    3. Hemorrhoids, unspecified hemorrhoid type      Plan:   Check CBC and CMP today  Continue as needed anusol supp and daily stool softener    Return in about 5 weeks (around 8/11/2017).  Will obtain end of treatment viral load at that time.         Lupillo Patino M.D.  [facility]  [address] [place] [address]  Office: [phone]

## 2017-07-23 ENCOUNTER — HOSPITAL ENCOUNTER (EMERGENCY)
Facility: HOSPITAL | Age: 47
End: 2017-07-23

## 2017-07-24 ENCOUNTER — TELEPHONE (OUTPATIENT)
Dept: GASTROENTEROLOGY | Facility: CLINIC | Age: 47
End: 2017-07-24

## 2017-07-24 DIAGNOSIS — E87.5 HYPERKALEMIA: Primary | ICD-10-CM

## 2017-07-24 DIAGNOSIS — E87.6 HYPOKALEMIA: ICD-10-CM

## 2017-07-24 NOTE — TELEPHONE ENCOUNTER
Called pt and advised that per Dr Patino: his labs showed his potassium was a little high so MD recommends to repeat the BMP to check that level. Pt verb understanding. Appt made for tomorrow at 3 PM. Lab order placed.

## 2017-07-24 NOTE — TELEPHONE ENCOUNTER
----- Message from Lupillo Patino MD sent at 7/24/2017  7:40 AM EDT -----  Please have pt come in for repeat BMP - K+ is a little high

## 2017-07-25 ENCOUNTER — HOSPITAL ENCOUNTER (OUTPATIENT)
Dept: GENERAL RADIOLOGY | Facility: HOSPITAL | Age: 47
Discharge: HOME | End: 2017-07-25
Attending: INTERNAL MEDICINE | Admitting: INTERNAL MEDICINE

## 2017-07-25 DIAGNOSIS — R06.02 SOB (SHORTNESS OF BREATH): ICD-10-CM

## 2017-07-25 PROCEDURE — 71020 HC CHEST PA AND LATERAL: CPT

## 2017-07-26 ENCOUNTER — TELEPHONE (OUTPATIENT)
Dept: GASTROENTEROLOGY | Facility: CLINIC | Age: 47
End: 2017-07-26

## 2017-07-26 LAB
BUN SERPL-MCNC: 18 MG/DL (ref 6–20)
BUN/CREAT SERPL: 16.1 (ref 7–25)
CALCIUM SERPL-MCNC: 9.6 MG/DL (ref 8.6–10.5)
CHLORIDE SERPL-SCNC: 101 MMOL/L (ref 98–107)
CO2 SERPL-SCNC: 27.4 MMOL/L (ref 22–29)
CREAT SERPL-MCNC: 1.12 MG/DL (ref 0.76–1.27)
GLUCOSE SERPL-MCNC: 61 MG/DL (ref 65–99)
POTASSIUM SERPL-SCNC: 5.4 MMOL/L (ref 3.5–5.2)
SODIUM SERPL-SCNC: 142 MMOL/L (ref 136–145)

## 2017-07-26 NOTE — TELEPHONE ENCOUNTER
----- Message from Lupillo Patino MD sent at 7/26/2017 12:31 PM EDT -----  Potassium still just a little elevated.  He should f/u with PCP - ? If this could be related to one of his BP medications.

## 2017-07-26 NOTE — TELEPHONE ENCOUNTER
Called pt and advised that per Dr Patino his repeat labs show his potassium is still a little elevated. Advised that MD recommends for him to follow up with his PMD as this could possibly be related to one of his BP meds. Pt verb understanding.

## 2017-07-26 NOTE — TELEPHONE ENCOUNTER
Potassium still just a little elevated.  He should f/u with PCP - ? If this could be related to one of his BP medications.

## 2017-09-15 ENCOUNTER — TELEPHONE (OUTPATIENT)
Dept: GASTROENTEROLOGY | Facility: CLINIC | Age: 47
End: 2017-09-15

## 2017-09-15 DIAGNOSIS — B18.2 CHRONIC HEPATITIS C WITHOUT HEPATIC COMA (HCC): Primary | ICD-10-CM

## 2017-09-15 NOTE — TELEPHONE ENCOUNTER
Patient walked into office questioned if he could have his lab work drawn before his appt on Monday. Orders placed for CBC, CMP and HCV and lab work drawn. Patient states he will keep his appointment with Dr. Patino on Monday.

## 2017-09-18 ENCOUNTER — OFFICE VISIT (OUTPATIENT)
Dept: GASTROENTEROLOGY | Facility: CLINIC | Age: 47
End: 2017-09-18

## 2017-09-18 VITALS
WEIGHT: 216.6 LBS | DIASTOLIC BLOOD PRESSURE: 72 MMHG | TEMPERATURE: 98.7 F | BODY MASS INDEX: 27.8 KG/M2 | SYSTOLIC BLOOD PRESSURE: 128 MMHG | HEIGHT: 74 IN

## 2017-09-18 DIAGNOSIS — B18.2 CHRONIC HEPATITIS C WITHOUT HEPATIC COMA (HCC): Primary | ICD-10-CM

## 2017-09-18 DIAGNOSIS — Z86.19 HISTORY OF HEPATITIS C VIRUS INFECTION: ICD-10-CM

## 2017-09-18 LAB
ALBUMIN SERPL-MCNC: 4.7 G/DL (ref 3.5–5.2)
ALBUMIN/GLOB SERPL: 1.6 G/DL
ALP SERPL-CCNC: 70 U/L (ref 39–117)
ALT SERPL-CCNC: 19 U/L (ref 1–41)
AST SERPL-CCNC: 19 U/L (ref 1–40)
BILIRUB SERPL-MCNC: 0.4 MG/DL (ref 0.1–1.2)
BUN SERPL-MCNC: 14 MG/DL (ref 6–20)
BUN/CREAT SERPL: 14.4 (ref 7–25)
CALCIUM SERPL-MCNC: 10.4 MG/DL (ref 8.6–10.5)
CHLORIDE SERPL-SCNC: 101 MMOL/L (ref 98–107)
CO2 SERPL-SCNC: 30.5 MMOL/L (ref 22–29)
CREAT SERPL-MCNC: 0.97 MG/DL (ref 0.76–1.27)
ERYTHROCYTE [DISTWIDTH] IN BLOOD BY AUTOMATED COUNT: 13.2 % (ref 11.5–14.5)
GLOBULIN SER CALC-MCNC: 2.9 GM/DL
GLUCOSE SERPL-MCNC: 128 MG/DL (ref 65–99)
HCT VFR BLD AUTO: 43.3 % (ref 40.4–52.2)
HCV RNA SERPL NAA+PROBE-ACNC: NORMAL IU/ML
HGB BLD-MCNC: 14.1 G/DL (ref 13.7–17.6)
MCH RBC QN AUTO: 32.7 PG (ref 27–32.7)
MCHC RBC AUTO-ENTMCNC: 32.6 G/DL (ref 32.6–36.4)
MCV RBC AUTO: 100.5 FL (ref 79.8–96.2)
PLATELET # BLD AUTO: 312 10*3/MM3 (ref 140–500)
POTASSIUM SERPL-SCNC: 5.3 MMOL/L (ref 3.5–5.2)
PROT SERPL-MCNC: 7.6 G/DL (ref 6–8.5)
RBC # BLD AUTO: 4.31 10*6/MM3 (ref 4.6–6)
SODIUM SERPL-SCNC: 144 MMOL/L (ref 136–145)
TEST INFORMATION: NORMAL
WBC # BLD AUTO: 6.75 10*3/MM3 (ref 4.5–10.7)

## 2017-09-18 PROCEDURE — 99212 OFFICE O/P EST SF 10 MIN: CPT | Performed by: INTERNAL MEDICINE

## 2017-09-18 NOTE — PROGRESS NOTES
Chief Complaint   Patient presents with   • Hepatic Disease     Subjective     HPI     Luis Quintana is a 47 y.o. male who presents today for follow-up.  Patient has now completed his 12 week course of Harvoni and I am happy to report that his end of treatment HCV viral load is undetectable.  He is obviously extremely pleased to hear these results.  He is currently without GI complaints.      Past Medical History:   Diagnosis Date   • Diabetes mellitus    • Hemorrhoids    • Hyperlipidemia    • Hypertension    • Infectious viral hepatitis     Hep C       Social History     Social History   • Marital status: Single     Spouse name: N/A   • Number of children: N/A   • Years of education: N/A     Social History Main Topics   • Smoking status: Former Smoker     Types: Cigarettes     Quit date: 2014   • Smokeless tobacco: Never Used   • Alcohol use Yes      Comment: occassional    • Drug use: No   • Sexual activity: Defer     Other Topics Concern   • None     Social History Narrative         Current Outpatient Prescriptions:   •  escitalopram (LEXAPRO) 20 MG tablet, Take 20 mg by mouth Daily., Disp: , Rfl:   •  ezetimibe (ZETIA) 10 MG tablet, Take 10 mg by mouth Daily., Disp: , Rfl:   •  insulin glargine (LANTUS) 100 UNIT/ML injection, Inject 38 Units under the skin Daily Before Supper., Disp: , Rfl:   •  Insulin Lispro (HUMALOG SC), Inject 35 Units under the skin 3 (Three) Times a Day With Meals. Takes 33-35 units 3 (three) times daily. Dose depends on food intake and exercise., Disp: , Rfl:   •  lisinopril (PRINIVIL,ZESTRIL) 40 MG tablet, Take 40 mg by mouth Daily., Disp: , Rfl:   •  montelukast (SINGULAIR) 10 MG tablet, Take 10 mg by mouth Every Night., Disp: , Rfl:   •  Multiple Vitamins-Minerals (MULTIVITAMIN WITH MINERALS) tablet tablet, Take 1 tablet by mouth Daily., Disp: , Rfl:   •  traZODone (DESYREL) 50 MG tablet, Take 50 mg by mouth Every Night. Takes 25 mg (1/2 tab) to 50 mg (1 tab) nightly at bedtime.,  Disp: , Rfl:   •  docusate sodium (COLACE) 100 MG capsule, Take 100 mg by mouth Daily., Disp: , Rfl:   •  hydrocortisone (ANUSOL-HC) 25 MG suppository, Insert 1 suppository into the rectum At Night As Needed for Hemorrhoids (rectal discomfort/bleeding)., Disp: 30 suppository, Rfl: 1    Review of Systems   Gastrointestinal: Negative.        Objective   Vitals:    09/18/17 1507   BP: 128/72   Temp: 98.7 °F (37.1 °C)       Physical Exam   Constitutional: He appears well-developed and well-nourished.   Psychiatric: He has a normal mood and affect. His behavior is normal. Judgment and thought content normal.   Vitals reviewed.      Assessment/Plan   Assessment:     1. History of Hepatitis C - now s/p treatment     Plan:   Pt with non-detectable end of treatment viral load  We will plan repeat HCV viral load in 24 weeks to ensure SVR.  After that, would recommend yearly LFTs.    He will f/u 1 week after next lab draw.        Lupillo Patino M.D.  [facility]  [address] [place] [address]  Office: [phone]

## 2017-09-23 ENCOUNTER — RESULTS ENCOUNTER (OUTPATIENT)
Dept: GASTROENTEROLOGY | Facility: CLINIC | Age: 47
End: 2017-09-23

## 2017-09-23 DIAGNOSIS — B18.2 CHRONIC HEPATITIS C WITHOUT HEPATIC COMA (HCC): ICD-10-CM

## 2018-01-11 ENCOUNTER — TELEPHONE (OUTPATIENT)
Dept: GASTROENTEROLOGY | Facility: CLINIC | Age: 48
End: 2018-01-11

## 2018-01-11 NOTE — TELEPHONE ENCOUNTER
Per Dr Patino's note form 9/18/17, HCV viral load and CMP due the week before appt. Called pt and advised that we will have labs drawn the week before and we can make appt for this. Appt scheduled for 2/14 at 3 PM.

## 2018-01-11 NOTE — TELEPHONE ENCOUNTER
----- Message from Candie Mcknight sent at 1/11/2018  3:32 PM EST -----  Regarding: PT CALLED  Contact: [phone]   PT IS CALLING ABOUT HAVING LABS BEFORE HE SEES THE  IN FEB 21ST ?? PT WOULD LIKE A CALL BACK.

## 2018-01-19 ENCOUNTER — TELEPHONE (OUTPATIENT)
Dept: GASTROENTEROLOGY | Facility: CLINIC | Age: 48
End: 2018-01-19

## 2018-01-19 NOTE — TELEPHONE ENCOUNTER
----- Message from Sandro Calvillo sent at 1/19/2018  2:57 PM EST -----  Regarding: RX  Contact: [phone]  PT CALLED ASKING FOR ENTOCORT CREAM       [facility]# [phone]

## 2018-01-19 NOTE — TELEPHONE ENCOUNTER
Called pt back. Pt states Dr Patino gave him samples of Micort cream to try and gave him a script of the medication to turn into his pharmacy. Pt states he used the samples and they worked well, but forgot to turn the script into his pharmacy and has now misplaced the script. Pt asking for script to be called in.   Medication e-scribed.

## 2018-02-16 LAB
ALBUMIN SERPL-MCNC: 4.6 G/DL (ref 3.5–5.2)
ALBUMIN/GLOB SERPL: 1.7 G/DL
ALP SERPL-CCNC: 66 U/L (ref 39–117)
ALT SERPL-CCNC: 17 U/L (ref 1–41)
AST SERPL-CCNC: 17 U/L (ref 1–40)
BILIRUB SERPL-MCNC: 0.4 MG/DL (ref 0.1–1.2)
BUN SERPL-MCNC: 19 MG/DL (ref 6–20)
BUN/CREAT SERPL: 22.6 (ref 7–25)
CALCIUM SERPL-MCNC: 9.7 MG/DL (ref 8.6–10.5)
CHLORIDE SERPL-SCNC: 99 MMOL/L (ref 98–107)
CO2 SERPL-SCNC: 31.8 MMOL/L (ref 22–29)
CREAT SERPL-MCNC: 0.84 MG/DL (ref 0.76–1.27)
GFR SERPLBLD CREATININE-BSD FMLA CKD-EPI: 120 ML/MIN/1.73
GFR SERPLBLD CREATININE-BSD FMLA CKD-EPI: 99 ML/MIN/1.73
GLOBULIN SER CALC-MCNC: 2.7 GM/DL
GLUCOSE SERPL-MCNC: 57 MG/DL (ref 65–99)
HCV RNA SERPL NAA+PROBE-ACNC: NORMAL IU/ML
POTASSIUM SERPL-SCNC: 5.1 MMOL/L (ref 3.5–5.2)
PROT SERPL-MCNC: 7.3 G/DL (ref 6–8.5)
SODIUM SERPL-SCNC: 140 MMOL/L (ref 136–145)
TEST INFORMATION: NORMAL

## 2018-02-20 ENCOUNTER — TELEPHONE (OUTPATIENT)
Dept: GASTROENTEROLOGY | Facility: CLINIC | Age: 48
End: 2018-02-20

## 2018-02-20 NOTE — TELEPHONE ENCOUNTER
----- Message from Candie Mcknight sent at 2/20/2018 10:20 AM EST -----  Regarding: [facility] CALLED  Contact: [phone]  AIXA CALLING FROM [facility] STATED THEY SENT OVER SEVERAL REQUEST FOR PT Hydrocortisone Acetate (MICORT-HC) 2.5 % cream?

## 2018-02-21 ENCOUNTER — OFFICE VISIT (OUTPATIENT)
Dept: GASTROENTEROLOGY | Facility: CLINIC | Age: 48
End: 2018-02-21

## 2018-02-21 VITALS
DIASTOLIC BLOOD PRESSURE: 72 MMHG | SYSTOLIC BLOOD PRESSURE: 118 MMHG | TEMPERATURE: 97.8 F | WEIGHT: 206 LBS | BODY MASS INDEX: 26.44 KG/M2 | HEIGHT: 74 IN

## 2018-02-21 DIAGNOSIS — Z86.19 HISTORY OF HEPATITIS B VIRUS INFECTION CONFERRING IMMUNITY: ICD-10-CM

## 2018-02-21 DIAGNOSIS — Z86.19 HISTORY OF HEPATITIS C VIRUS INFECTION: Primary | ICD-10-CM

## 2018-02-21 PROCEDURE — 99213 OFFICE O/P EST LOW 20 MIN: CPT | Performed by: INTERNAL MEDICINE

## 2018-02-21 NOTE — PROGRESS NOTES
Chief Complaint   Patient presents with   • Follow-up   • Hepatitis C     Subjective     HPI  Luis Quintana is a 47 y.o. male who presents Today for follow-up of his hepatitis C.  The patient is now 24 weeks out from completion of therapy with [facility] and his most recent HCV viral load remains undetectable.  Overall patient is doing quite well.  Internal hemorrhoids seem to be well managed with as needed hydrocortisone cream.    Past Medical History:   Diagnosis Date   • Diabetes mellitus    • Hemorrhoids    • Hyperlipidemia    • Hypertension    • Infectious viral hepatitis     Hep C       Social History     Social History   • Marital status: Single     Spouse name: N/A   • Number of children: N/A   • Years of education: N/A     Social History Main Topics   • Smoking status: Former Smoker     Types: Cigarettes     Quit date: 2014   • Smokeless tobacco: Never Used   • Alcohol use Yes      Comment: occassional    • Drug use: No   • Sexual activity: Defer     Other Topics Concern   • None     Social History Narrative         Current Outpatient Prescriptions:   •  docusate sodium (COLACE) 100 MG capsule, Take 100 mg by mouth Daily., Disp: , Rfl:   •  escitalopram (LEXAPRO) 20 MG tablet, Take 20 mg by mouth Daily., Disp: , Rfl:   •  ezetimibe (ZETIA) 10 MG tablet, Take 10 mg by mouth Daily., Disp: , Rfl:   •  Hydrocortisone Acetate (MICORT-HC) 2.5 % cream, Insert 1 application into the rectum 3 (Three) Times a Day As Needed (for rectal bleeding or pain)., Disp: 28 g, Rfl: 0  •  insulin glargine (LANTUS) 100 UNIT/ML injection, Inject 38 Units under the skin Daily Before Supper., Disp: , Rfl:   •  Insulin Lispro (HUMALOG SC), Inject 35 Units under the skin 3 (Three) Times a Day With Meals. Takes 33-35 units 3 (three) times daily. Dose depends on food intake and exercise., Disp: , Rfl:   •  lisinopril (PRINIVIL,ZESTRIL) 40 MG tablet, Take 40 mg by mouth Daily., Disp: , Rfl:   •  montelukast (SINGULAIR) 10 MG tablet, Take  10 mg by mouth Every Night., Disp: , Rfl:   •  Multiple Vitamins-Minerals (MULTIVITAMIN WITH MINERALS) tablet tablet, Take 1 tablet by mouth Daily., Disp: , Rfl:   •  traZODone (DESYREL) 50 MG tablet, Take 50 mg by mouth Every Night. Takes 25 mg (1/2 tab) to 50 mg (1 tab) nightly at bedtime., Disp: , Rfl:   •  hydrocortisone (ANUSOL-HC) 25 MG suppository, Insert 1 suppository into the rectum At Night As Needed for Hemorrhoids (rectal discomfort/bleeding)., Disp: 30 suppository, Rfl: 1    Review of Systems   Constitutional: Negative.    Cardiovascular: Negative.    Gastrointestinal: Negative.        Objective   Vitals:    02/21/18 0914   BP: 118/72   Temp: 97.8 °F (36.6 °C)       Physical Exam   Constitutional: He is oriented to person, place, and time. He appears well-developed and well-nourished.   HENT:   Head: Normocephalic and atraumatic.   Abdominal: Soft. Bowel sounds are normal. He exhibits no distension and no mass. There is no tenderness. No hernia.   Neurological: He is alert and oriented to person, place, and time.   Skin: Skin is warm and dry.   Psychiatric: He has a normal mood and affect. His behavior is normal. Judgment and thought content normal.   Vitals reviewed.      Assessment/Plan   Assessment:     1. History of hepatitis C virus infection    2. History of hepatitis B virus infection conferring immunity    3.      History of colon polyps    Plan:   Patient has demonstrated SVR with regards to his hepatitis C treatment and no further workup is necessary.  He had F2 fibrosis on fibro-sure and fibrosis can in the past.  He can follow-up in one year  He has a history of prior hepatitis B infection with what appears to be spontaneous clearance and development of natural immunity.  No further workup is necessary  He will be due for surveillance colonoscopy in 4 years and has been placed into our recall system.        Lupillo Patino M.D.  [facility], [facility]  [address] [place] [address]  [address]  [place] [address]  Office: [phone]

## 2018-08-30 ENCOUNTER — HOSPITAL ENCOUNTER (OUTPATIENT)
Dept: GENERAL RADIOLOGY | Facility: HOSPITAL | Age: 48
Discharge: HOME | End: 2018-08-30
Attending: INTERNAL MEDICINE | Admitting: INTERNAL MEDICINE

## 2018-08-30 DIAGNOSIS — B99.9 INFECTION: ICD-10-CM

## 2018-08-30 PROCEDURE — 70220 X-RAY EXAM OF SINUSES: CPT

## 2018-09-05 ENCOUNTER — TRANSCRIBE ORDERS (OUTPATIENT)
Dept: ADMINISTRATIVE | Facility: HOSPITAL | Age: 48
End: 2018-09-05

## 2018-09-05 DIAGNOSIS — R20.0 NUMBNESS: Primary | ICD-10-CM

## 2018-09-07 ENCOUNTER — HOSPITAL ENCOUNTER (OUTPATIENT)
Dept: INFUSION THERAPY | Facility: HOSPITAL | Age: 48
Discharge: HOME | End: 2018-09-07
Attending: INTERNAL MEDICINE | Admitting: PHYSICAL MEDICINE & REHABILITATION

## 2018-09-07 DIAGNOSIS — R20.0 NUMBNESS: ICD-10-CM

## 2018-09-07 PROCEDURE — 95886 MUSC TEST DONE W/N TEST COMP: CPT

## 2018-09-07 PROCEDURE — 95908 NRV CNDJ TST 3-4 STUDIES: CPT

## 2018-09-07 NOTE — PROCEDURES
HISTORY:     The patient is a 48-year-old male who presents with a complaint of numbness of the right leg and right foot drop.  He states that he has been experiencing the symptoms for 6 days.    The patient denies back or knee pain.          I. NERVE CONDUCTION STUDIES:       The distal latency of the right peroneal motor nerve is 5.2 ms. The amplitude of evoked response is 3.3 mV at the ankle, 2.6 mV below the knee, and 2.2 mV above the knee.  The conduction is 42 m/sec below the knee and 28 m/sec above the knee.      The distal latency of the right tibial motor nerve is 4.9 ms. The amplitude of evoked response is 6.7 mV. The conduction velocity is 44 m/sec.      The distal latency of the right sural sensory nerve is 3.3 ms. The amplitude of evoked response is 18 microvolts.       II. ELECTROMYOGRAPHY:       Electromyography was performed on the following muscles of the right lower extremity using a monopolar needle: Vastus lateralis, tibialis anterior, peroneus longus, extensor hallucis longus, medial and lateral gastrocnemius, and bicep femoris.  The lumbar paraspinal muscles were also examined.      There were no changes in insertional activity. There were a few denervation potentials present in the right tibialis anterior.       There was a slight decrease in recruitment of MUAPs from the right tibialis anterior, peroneus longus, and extensor hallucis longus.      III. IMPRESSION:      1. Nerve conduction studies revealed slowing of conduction velocity of the right peroneal nerve across the fibular head. Electromyographic abnormalities were present in the right tibialis anterior, peroneus longus, and extensor hallucis longus.      2. The pattern of electrophysiologic abnormalities is consistent with a neuropathic process involving the right peroneal nerve at the fibular head.  Clinical correlation is recommended.    3. It should be noted that peak denervation potentials tend to appear 7 to 10 days after  the onset of symptoms.  The patient has been symptomatic for 6 days. If the patient's symptoms persist or progress, a repeat study should be considered.          Alyssa Crawley M.D.*

## 2018-09-17 ENCOUNTER — OFFICE VISIT (OUTPATIENT)
Dept: NEUROLOGY | Facility: CLINIC | Age: 48
End: 2018-09-17

## 2018-09-17 VITALS
SYSTOLIC BLOOD PRESSURE: 120 MMHG | DIASTOLIC BLOOD PRESSURE: 68 MMHG | HEIGHT: 74 IN | HEART RATE: 80 BPM | OXYGEN SATURATION: 96 % | WEIGHT: 199 LBS | BODY MASS INDEX: 25.54 KG/M2

## 2018-09-17 DIAGNOSIS — G57.31 NEUROPATHY OF RIGHT PERONEAL NERVE: Primary | ICD-10-CM

## 2018-09-17 PROCEDURE — 99243 OFF/OP CNSLTJ NEW/EST LOW 30: CPT | Performed by: PSYCHIATRY & NEUROLOGY

## 2018-09-17 RX ORDER — DESVENLAFAXINE SUCCINATE 50 MG/1
50 TABLET, EXTENDED RELEASE ORAL DAILY
COMMUNITY
End: 2019-11-25

## 2018-09-17 RX ORDER — FLUTICASONE PROPIONATE 50 MCG
2 SPRAY, SUSPENSION (ML) NASAL DAILY
COMMUNITY

## 2018-09-17 NOTE — PROGRESS NOTES
"CC: Right foot drop    HPI:  Luis Quintana is a  48 y.o.  right-handed [place] male who was sent by Dr. Kim for a neurological consultation regarding a right foot drop.  The patient states that 15 days ago he had been out and had a couple of alcoholic beverages and came home and went to bed as normal.  When he woke up his right anterior shin and foot were numb and his foot was floppy at the ankle in terms of extension.  He could still pushed down just fine.  He denied any symptoms in his right arm and he denied any other symptoms such as visual change or speech or language problem.  Since that time he has regained some function which he gauges at about 30%.  Although the anterior ankle area feels a bit \"odd\" it's not numb like it was.  He is able to stand on his heels to some degree now.  She specifically denies any significant low back pain.    He was sent for an EMG which was done 6 days after onset of symptoms by Dr. Crawley (9/7/18) which demonstrated a right peroneal motor velocity across the fibular head of 28 m/s with the below the knee velocity of 42 m/s.  The distal latency was normal at 5.2 ms and the amplitude was normal at 3.3 mV from ankle stimulation but somewhat lower from the popliteal fossa at 2.2 mV.  The tibial nerve velocity was 44 m/s with latency of 4.9 ms and amplitude of 6.7 mV.  The sural sensory study showed a normal distal latency of 3.3 ms with normal amplitude of 18 µV.  Needle examination showed some minor changes in 3 muscles enervated by the peroneal nerve.  Paraspinals were negative.    The patient denies any previous issue with neuropathy such as numbness or tingling in the hands or feet or burning in the toes.  He has a 25 year history of diabetes which is insulin-dependent.  He indicates that he was raised in [place].  He also has history of hypertension.  He denies significant head trauma or spine trauma meningitis seizures or stroke.  " He has had some hypoglycemic episodes of loss of consciousness.  He denies any family history of stroke brain tumor brain hemorrhage or aneurysm of a brain artery.    As a child he states he developed a right Bell's palsy with almost full recovery.    Past Medical History:   Diagnosis Date   • Diabetes mellitus (CMS/HCC)    • Hemorrhoids    • Hyperlipidemia    • Hypertension    • Infectious viral hepatitis     Hep C         Past Surgical History:   Procedure Laterality Date   • COLONOSCOPY W/ POLYPECTOMY N/A 4/27/2017    EH, polyps, tics, IH   • ENDOSCOPY N/A 4/27/2017    LA Grade B reflux esophagitis, 2 cm HH, gastritis           Current Outpatient Prescriptions:   •  desvenlafaxine (PRISTIQ) 50 MG 24 hr tablet, Take 50 mg by mouth Daily., Disp: , Rfl:   •  fluticasone (FLONASE) 50 MCG/ACT nasal spray, 2 sprays into the nostril(s) as directed by provider Daily., Disp: , Rfl:   •  insulin glargine (LANTUS) 100 UNIT/ML injection, Inject 38 Units under the skin Daily Before Supper., Disp: , Rfl:   •  Insulin Lispro (HUMALOG SC), Inject 35 Units under the skin 3 (Three) Times a Day With Meals. Takes 33-35 units 3 (three) times daily. Dose depends on food intake and exercise., Disp: , Rfl:   •  lisinopril (PRINIVIL,ZESTRIL) 40 MG tablet, Take 40 mg by mouth Daily., Disp: , Rfl:   •  montelukast (SINGULAIR) 10 MG tablet, Take 10 mg by mouth Every Night., Disp: , Rfl:   •  Multiple Vitamins-Minerals (MULTIVITAMIN WITH MINERALS) tablet tablet, Take 1 tablet by mouth Daily., Disp: , Rfl:       Family History   Problem Relation Age of Onset   • Arrhythmia Mother          Social History     Social History   • Marital status: Single     Spouse name: N/A   • Number of children: N/A   • Years of education: N/A     Occupational History   • Not on file.     Social History Main Topics   • Smoking status: Former Smoker     Types: Cigarettes     Quit date: 2014   • Smokeless tobacco: Never Used   • Alcohol use Yes      Comment:  "occassional    • Drug use: No   • Sexual activity: Defer     Other Topics Concern   • Not on file     Social History Narrative   • No narrative on file         Allergies   Allergen Reactions   • Penicillins    • Sulfa Antibiotics Rash     Reaction happened when patient was a child. He thinks that he developed a rash but cannot definitively remember.          Pain Scale: 0/10        ROS:  Review of Systems   Constitutional: Negative.    HENT: Negative.    Eyes: Negative.    Respiratory: Negative.    Cardiovascular: Negative.    Gastrointestinal: Negative.    Endocrine: Negative.    Musculoskeletal: Positive for gait problem. Negative for arthralgias, back pain, joint swelling, myalgias, neck pain and neck stiffness.   Skin: Negative.    Allergic/Immunologic: Negative.    Neurological: Positive for numbness. Negative for dizziness, tremors, seizures, syncope, facial asymmetry, speech difficulty, weakness, light-headedness and headaches.   Hematological: Negative.    Psychiatric/Behavioral: Negative.            Physical Exam:  Vitals:    09/17/18 1228   BP: 120/68   Pulse: 80   SpO2: 96%   Weight: 90.3 kg (199 lb)   Height: 188 cm (74\")     Body mass index is 25.55 kg/m².    Physical Exam  Gen.: Well-developed white male no acute distress  HEENT: Normocephalic no evidence of trauma.  Discs flat.  No A-V nicking.  Throat negative.  Neck: Supple.  No thyromegaly.  No cervical bruits.  Radial pulses were strong and simultaneous.  Heart: Regular rate and rhythm without murmurs.  No pedal edema.      Neurological Exam:   Mental status: Awake alert oriented and conversant without evidence of an affective disorder thought disorder delusions or hallucinations.  H CF: No aphasia or apraxia or dysarthria.  Recent and remote memory intact.  Knowledge of recent events intact.  Cranial nerves: 2-12 intact except very subtle depression of the right nasal labial fold and some occasional fasciculations right face  Motor: Normal tone " and bulk.  There is mild weakness of the tibialis anterior and toe extensors and great toe extensor muscles.  All other muscles tested in the arms and legs were 5/5.  Reflexes: Diffusely hypoactive with downgoing toe signs.  Sensory: Normal light touch and pinprick throughout the arms and legs although he describes light touch on the anterior ankle feeling somewhat different.  Vibration and position senses are normal in the feet.  Cerebellar: Finger to nose rapid movement heel-to-shin normal  Gait and Station: Casual walk shows a very slight foot drop.  He can walk on toes and transiently can get up on the heels but the right foot gives out some.  No Romberg no drift        Results:      Lab Results   Component Value Date    GLUCOSE 413 (C) 03/31/2017    BUN 19 02/14/2018    CREATININE 0.84 02/14/2018    EGFRIFNONA 99 02/14/2018    EGFRIFAFRI 120 02/14/2018    BCR 22.6 02/14/2018    CO2 31.8 (H) 02/14/2018    CALCIUM 9.7 02/14/2018    PROTENTOTREF 7.3 02/14/2018    ALBUMIN 4.60 02/14/2018    LABIL2 1.7 02/14/2018    AST 17 02/14/2018    ALT 17 02/14/2018       Lab Results   Component Value Date    WBC 6.75 09/15/2017    HGB 14.1 09/15/2017    HCT 43.3 09/15/2017    .5 (H) 09/15/2017     09/15/2017         .No results found for: RPR      No results found for: TSH, W8NQLKN, Q0SFFOZ, THYROIDAB      No results found for: [id number]      No results found for: FOLATE      Lab Results   Component Value Date    HGBA1C 7.39 (H) 03/31/2017               Assessment:   1.  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  It is likely compressive as opposed to entrapment since he describes no trauma to the knee where fibrosis could develop.  His diabetes is a risk factor but he does not have clinical or electrodiagnostic evidence for more diffuse polyneuropathy.  II.  History of right Bell's palsy with near full recovery          Plan:  1.  He demonstrated several exercises  which he is doing which I believe are all just fine.  He already knows to avoid pressure over the peroneal nerve and to be cautious about how he sleeps; overall try to shift positions more frequently in everything he does to avoid too much pressure on individual nerves  2.  I don't think further investigation is needed at this time.  I told him if he wasn't dramatically better by 6 months to return for a second look.  3.  Optimal control of blood sugar was reinforced.                          Dictated utilizing Dragon dictation.

## 2018-11-27 ENCOUNTER — HOSPITAL ENCOUNTER (OUTPATIENT)
Dept: GENERAL RADIOLOGY | Facility: HOSPITAL | Age: 48
Discharge: HOME | End: 2018-11-27
Attending: INTERNAL MEDICINE | Admitting: INTERNAL MEDICINE

## 2018-11-27 DIAGNOSIS — J01.80 OTHER ACUTE SINUSITIS, RECURRENCE NOT SPECIFIED: ICD-10-CM

## 2018-11-27 PROCEDURE — 70220 X-RAY EXAM OF SINUSES: CPT

## 2019-06-04 ENCOUNTER — TRANSCRIBE ORDERS (OUTPATIENT)
Dept: ADMINISTRATIVE | Facility: HOSPITAL | Age: 49
End: 2019-06-04

## 2019-06-04 DIAGNOSIS — J32.9 RECURRENT SINUSITIS: Primary | ICD-10-CM

## 2019-06-07 ENCOUNTER — HOSPITAL ENCOUNTER (OUTPATIENT)
Dept: CT IMAGING | Facility: HOSPITAL | Age: 49
Discharge: HOME | End: 2019-06-07
Admitting: OTOLARYNGOLOGY

## 2019-06-07 DIAGNOSIS — J32.9 RECURRENT SINUSITIS: ICD-10-CM

## 2019-06-07 PROCEDURE — 70486 CT MAXILLOFACIAL W/O DYE: CPT

## 2020-09-24 ENCOUNTER — TRANSCRIBE ORDERS (OUTPATIENT)
Dept: ADMINISTRATIVE | Facility: HOSPITAL | Age: 50
End: 2020-09-24

## 2020-09-24 DIAGNOSIS — G50.1 ATYPICAL FACE PAIN: Primary | ICD-10-CM

## 2020-10-04 ENCOUNTER — HOSPITAL ENCOUNTER (OUTPATIENT)
Dept: CT IMAGING | Facility: HOSPITAL | Age: 50
Discharge: HOME | End: 2020-10-04
Admitting: OTOLARYNGOLOGY

## 2020-10-04 DIAGNOSIS — G50.1 ATYPICAL FACE PAIN: ICD-10-CM

## 2020-10-04 PROCEDURE — 70486 CT MAXILLOFACIAL W/O DYE: CPT

## 2020-11-05 ENCOUNTER — LAB (OUTPATIENT)
Dept: LAB | Facility: HOSPITAL | Age: 50
End: 2020-11-05

## 2020-11-05 ENCOUNTER — TRANSCRIBE ORDERS (OUTPATIENT)
Dept: ADMINISTRATIVE | Facility: HOSPITAL | Age: 50
End: 2020-11-05

## 2020-11-05 DIAGNOSIS — J31.0 CHRONIC RHINITIS: ICD-10-CM

## 2020-11-05 DIAGNOSIS — J31.0 CHRONIC RHINITIS: Primary | ICD-10-CM

## 2020-11-05 PROCEDURE — 86235 NUCLEAR ANTIGEN ANTIBODY: CPT

## 2020-11-05 PROCEDURE — 83520 IMMUNOASSAY QUANT NOS NONAB: CPT

## 2020-11-05 PROCEDURE — 36415 COLL VENOUS BLD VENIPUNCTURE: CPT

## 2020-11-05 PROCEDURE — 86225 DNA ANTIBODY NATIVE: CPT

## 2020-11-06 LAB
CENTROMERE B AB SER-ACNC: <0.2 AI (ref 0–0.9)
CHROMATIN AB SERPL-ACNC: <0.2 AI (ref 0–0.9)
DSDNA AB SER-ACNC: 2 IU/ML (ref 0–9)
ENA JO1 AB SER-ACNC: <0.2 AI (ref 0–0.9)
ENA RNP AB SER-ACNC: <0.2 AI (ref 0–0.9)
ENA SCL70 AB SER-ACNC: <0.2 AI (ref 0–0.9)
ENA SM AB SER-ACNC: <0.2 AI (ref 0–0.9)
ENA SS-A AB SER-ACNC: <0.2 AI (ref 0–0.9)
ENA SS-B AB SER-ACNC: <0.2 AI (ref 0–0.9)
Lab: NORMAL

## 2020-11-08 LAB
MYELOPEROXIDASE AB SER IA-ACNC: <9 U/ML (ref 0–9)
PROTEINASE3 AB SER IA-ACNC: <3.5 U/ML (ref 0–3.5)

## 2021-01-18 ENCOUNTER — IMMUNIZATION (OUTPATIENT)
Dept: VACCINE CLINIC | Facility: HOSPITAL | Age: 51
End: 2021-01-18

## 2021-01-18 PROCEDURE — 91300 HC SARSCOV02 VAC 30MCG/0.3ML IM: CPT | Performed by: INTERNAL MEDICINE

## 2021-01-18 PROCEDURE — 0001A: CPT | Performed by: INTERNAL MEDICINE

## 2021-02-08 ENCOUNTER — IMMUNIZATION (OUTPATIENT)
Dept: VACCINE CLINIC | Facility: HOSPITAL | Age: 51
End: 2021-02-08

## 2021-02-08 PROCEDURE — 0002A: CPT | Performed by: INTERNAL MEDICINE

## 2021-02-08 PROCEDURE — 91300 HC SARSCOV02 VAC 30MCG/0.3ML IM: CPT | Performed by: INTERNAL MEDICINE

## 2021-04-12 ENCOUNTER — TELEPHONE (OUTPATIENT)
Dept: INTERNAL MEDICINE | Facility: CLINIC | Age: 51
End: 2021-04-12

## 2021-04-12 NOTE — TELEPHONE ENCOUNTER
PATIENT IS CALLING TODAY BECAUSE HE WAS NOTIFIED THAT HIS NEW PATIENT APPT WAS CANCELED BY THE PROVIDER AND PATIENT IS CONCERNED BECAUSE MARLEY GUILLERMO'S NEXT APPT ISN'T UNTIL THE END OF June.    PATIENT STATED HE HAS BEEN WAITING ON THIS APPT FOR ALMOST 2 MONTHS AND IS ASKING WHAT CAN BE DONE TO GET HIM SEEN SOON. PATIENT IS ONLY ASKING DUE TO NEEDING HIS INSULIN REFILLED BECAUSE HE IS A DIABETIC AND DOESN'T HAVE A PROVIDER TO REFILL THIS MEDICATION. PLEASE ADVISE.    CALL BACK: [phone]

## 2021-04-13 NOTE — TELEPHONE ENCOUNTER
I spoke with the Pt. Pt stated he was upset that Steven had to cancel his appointment. Pt states that we are not treating him like a human, that he is almost out of medication. I advised Pt that we can not fill his medications without him seeing a provider here first to establish care.     (Pt was given a list of names of providers that are taking new patients in the office. Pt agreed to establish care with [facility].    Pt was being scheduled and I was looking for a day and time. (Pt waiting on phone) Pt asked, once Steven came back could he switch to Steven. I advised the Pt he could not switch providers within the office once established.    Pt seemed a little more upset and Pt stated that he was disappointed with [facility] and that no one is concerned about him. Pt hung up the phone, without letting me assistance any further.

## 2021-05-12 ENCOUNTER — OFFICE VISIT (OUTPATIENT)
Dept: INTERNAL MEDICINE | Facility: CLINIC | Age: 51
End: 2021-05-12

## 2021-05-12 VITALS
HEIGHT: 72 IN | WEIGHT: 220 LBS | DIASTOLIC BLOOD PRESSURE: 80 MMHG | TEMPERATURE: 97.8 F | BODY MASS INDEX: 29.8 KG/M2 | SYSTOLIC BLOOD PRESSURE: 130 MMHG

## 2021-05-12 DIAGNOSIS — B18.2 CHRONIC HEPATITIS C WITHOUT HEPATIC COMA (HCC): Chronic | ICD-10-CM

## 2021-05-12 DIAGNOSIS — T78.40XD ALLERGY, SUBSEQUENT ENCOUNTER: ICD-10-CM

## 2021-05-12 DIAGNOSIS — I10 ESSENTIAL HYPERTENSION: Chronic | ICD-10-CM

## 2021-05-12 DIAGNOSIS — M25.511 CHRONIC RIGHT SHOULDER PAIN: ICD-10-CM

## 2021-05-12 DIAGNOSIS — E10.9 TYPE 1 DIABETES MELLITUS WITHOUT COMPLICATION (HCC): ICD-10-CM

## 2021-05-12 DIAGNOSIS — F41.8 DEPRESSION WITH ANXIETY: ICD-10-CM

## 2021-05-12 DIAGNOSIS — E55.9 VITAMIN D DEFICIENCY: ICD-10-CM

## 2021-05-12 DIAGNOSIS — Z76.89 ENCOUNTER TO ESTABLISH CARE: Primary | ICD-10-CM

## 2021-05-12 DIAGNOSIS — G89.29 CHRONIC RIGHT SHOULDER PAIN: ICD-10-CM

## 2021-05-12 DIAGNOSIS — E78.2 MIXED HYPERLIPIDEMIA: Chronic | ICD-10-CM

## 2021-05-12 PROBLEM — Z79.4 TYPE 2 DIABETES MELLITUS WITHOUT COMPLICATION, WITH LONG-TERM CURRENT USE OF INSULIN (HCC): Chronic | Status: ACTIVE | Noted: 2017-04-18

## 2021-05-12 PROBLEM — T78.40XA ALLERGIES: Status: ACTIVE | Noted: 2021-05-12

## 2021-05-12 PROBLEM — E11.9 TYPE 2 DIABETES MELLITUS WITHOUT COMPLICATION, WITH LONG-TERM CURRENT USE OF INSULIN (HCC): Chronic | Status: ACTIVE | Noted: 2017-04-18

## 2021-05-12 PROBLEM — T78.40XA ALLERGIES: Chronic | Status: ACTIVE | Noted: 2021-05-12

## 2021-05-12 PROBLEM — Z79.4 TYPE 2 DIABETES MELLITUS WITHOUT COMPLICATION, WITH LONG-TERM CURRENT USE OF INSULIN: Status: ACTIVE | Noted: 2017-04-18

## 2021-05-12 PROCEDURE — 99204 OFFICE O/P NEW MOD 45 MIN: CPT | Performed by: NURSE PRACTITIONER

## 2021-05-12 RX ORDER — ROSUVASTATIN CALCIUM 5 MG/1
5 TABLET, COATED ORAL DAILY
Qty: 90 TABLET | Refills: 1 | Status: SHIPPED | OUTPATIENT
Start: 2021-05-12 | End: 2021-11-07 | Stop reason: SDUPTHER

## 2021-05-12 RX ORDER — PROCHLORPERAZINE 25 MG/1
1 SUPPOSITORY RECTAL
Qty: 2 EACH | Refills: 1 | Status: SHIPPED | OUTPATIENT
Start: 2021-05-12

## 2021-05-12 RX ORDER — INSULIN GLARGINE 100 [IU]/ML
38 INJECTION, SOLUTION SUBCUTANEOUS DAILY
Qty: 75 ML | Refills: 1 | Status: SHIPPED | OUTPATIENT
Start: 2021-05-12 | End: 2022-04-04 | Stop reason: SDUPTHER

## 2021-05-12 RX ORDER — LISINOPRIL 40 MG/1
40 TABLET ORAL DAILY
Qty: 90 TABLET | Refills: 1 | Status: SHIPPED | OUTPATIENT
Start: 2021-05-12 | End: 2021-11-07 | Stop reason: SDUPTHER

## 2021-05-12 RX ORDER — PEN NEEDLE, DIABETIC 31 GX5/16"
1 NEEDLE, DISPOSABLE MISCELLANEOUS 4 TIMES DAILY
Qty: 400 EACH | Refills: 1 | Status: SHIPPED | OUTPATIENT
Start: 2021-05-12 | End: 2022-03-28 | Stop reason: SDUPTHER

## 2021-05-12 RX ORDER — MONTELUKAST SODIUM 10 MG/1
10 TABLET ORAL NIGHTLY
Qty: 90 TABLET | Refills: 1 | Status: SHIPPED | OUTPATIENT
Start: 2021-05-12 | End: 2021-11-07 | Stop reason: SDUPTHER

## 2021-05-12 RX ORDER — PROCHLORPERAZINE 25 MG/1
SUPPOSITORY RECTAL
COMMUNITY
End: 2021-05-12 | Stop reason: SDUPTHER

## 2021-05-12 RX ORDER — PROCHLORPERAZINE 25 MG/1
SUPPOSITORY RECTAL
Qty: 9 EACH | Refills: 1 | Status: SHIPPED | OUTPATIENT
Start: 2021-05-12 | End: 2022-11-23 | Stop reason: SDUPTHER

## 2021-05-12 RX ORDER — INSULIN GLARGINE 100 [IU]/ML
INJECTION, SOLUTION SUBCUTANEOUS
Qty: 100 ML | Refills: 0 | Status: CANCELLED | OUTPATIENT
Start: 2021-05-12

## 2021-05-12 RX ORDER — INSULIN LISPRO 100 [IU]/ML
35 INJECTION, SOLUTION INTRAVENOUS; SUBCUTANEOUS
Qty: 90 ML | Refills: 1 | Status: SHIPPED | OUTPATIENT
Start: 2021-05-12 | End: 2021-10-30 | Stop reason: SDUPTHER

## 2021-05-12 NOTE — PATIENT INSTRUCTIONS
As a Diabetic, you need a Diabetic Eye Exam YEARLY.  Please have your provider to send a copy of the note to our office.  Fax: [phone]    Your last A1C was   Lab Results   Component Value Date    HGBA1C 7.39 (H) 03/31/2017       This is a 3 month average of your blood sugar.  Your goal is to be LESS than 7%.         It's Nearly Summer Time!    Stay hydrated when outside  If your urine starts to get darker, you are not drinking enough     Protect yourself from ticks and mosquitos  Here are the best ingredients to look for:  · DEET (N,N-diethyl-m-toluamide or N,N-diethyl-3-methyl-benzamide)  · Picaridin  · Oil of lemon eucalyptus (p-menthane-3,8-diol or PMD)  Wear light-colored pants so you can spot ticks easier  If you use a permethrin product, ONLY apply to clothing    Practice Safe Sun!    · Use sun screen SPF >30 daily, reapply regularly per directions on package  · See dermatologist for skin check regularly  · Protect your eyes with sunglasses with UV protection    Poison Ivy  If you are going into areas that may have poison ivy, prepare with a product like IvyX or other ivy blocker.  Wash your clothes and pets after being in an area with ivy when returning home. If you come in contact with poison ivy, try a product like Technu     Other things you should incorporate all year...     Diet:    • Eat vegetables, fruits, whole grain, low-fat dairy, poultry, fish, beans, nontropical vegetable oils, and nuts, but avoid red meat (i.e., Mediterranean-style diet, DASH [Dietary Approaches to Stop Hypertension] diet).  • Limit sugary drinks and sweets.  • Limit saturated and trans fat to 5% to 6% of calories.  • Limit sodium intake to 2,400 mg daily (about one teaspoon table salt [kosher/sea salt have less sodium per teaspoon]).  Weight loss / Calorie Counting Apps:    • Lose It!   • MyFitness Pal   • Works great when you try it with a partner/ friend. It takes about 15 minutes a day but studies show that this simple  method of monitoring your intake can help you achieve goals as it keeps you accountable.  I often will ask patients to try these apps just to get an idea of how much sodium and how many carbohydrates you are taking in.   Exercise:   • Engage in moderate-to-vigorous aerobic activity for at least 40 minutes (on average) three to four times each week.  Wearables:   • Activity tracker   • Step tracker: getting 7,500 steps daily can cut your cardiac risks by 44%   Bone Health:   • [email or web address]/patients/treatment/[email or web address]  • Routine weight bearing exercise

## 2021-05-12 NOTE — PROGRESS NOTES
Chief Complaint  Establish Care and Shoulder Pain     Subjective:      History of Present Illness {CC  Problem List  Visit  Diagnosis   Encounters  Notes  Medications  Labs  Result Review Imaging  Media :23}     Luis Quintana presents to [facility] to establish care.     Prior PCP: Dr Kim   Progress note from February 19, 2020 reviewed.    He has had both COVID vaccines.     He chronically has hypertension, diabetes (insulin dependent), he was previously on crestor and not sure why he stopped, allergies (recurrent sinus infections with prior sinus surgery), anxiety/depression     Hypertension  This is a chronic problem. The current episode started more than 1 year ago. The problem is controlled. Current antihypertension treatment includes ACE inhibitors. The current treatment provides significant improvement.   Diabetes  He has type 1 diabetes mellitus. His disease course has been stable. There are no diabetic associated symptoms. Current diabetic treatment includes insulin injections. He is compliant with treatment all of the time. He is following a diabetic diet. An ACE inhibitor/angiotensin II receptor blocker is being taken. Eye exam is not current.   DM Eye: Dr Tracy - due for visit this year (missed last year due to COVID)   His last A1c, September 1, 2020,  6%  SHW=129  He needs all diabetes prescriptions and supplies refilled.       Prior Hep C treated with harvoni in 2017. He was seen by Dr Patino.   He had F2 fibrosis on fibro-sure and fibrosis can in the past.  He can follow-up in one year  He has a history of prior hepatitis B infection with what appears to be spontaneous clearance and development of natural immunity    R shoulder pain - use to go to firing range.   States in general doesn't bother him but he can sleep in a certain way or twist his arm in a certain position it will have pain radiate down to thumb. No neck pain.  He would like to be  referred to Ortho for further evaluation.    He sees a psychiatrist for anxiety and depression and takes trazodone and lorazepam routinely.  I advised him to use trazodone sparingly.    For allergies he continues Singulair and Flonase.    Vitamin D deficiency: He states he takes 2 pills daily but unsure of dose.    Colonoscopy due: 2022    He works in IT for [employer]     Patient Care Team:  Delia Gresham III, NP-C as PCP - General (Internal Medicine)  Juaquin Riggs MD (Psychiatry)  Lupillo Patino MD as Consulting Physician (Gastroenterology)  Afshin Mike MD as Consulting Physician (Otolaryngology)    Objective:      Physical Exam  Vitals reviewed.   Constitutional:       Appearance: Normal appearance. He is well-developed.   HENT:      Head:      Comments: Wearing mask due to COVID   Neck:      Thyroid: No thyromegaly.   Cardiovascular:      Rate and Rhythm: Normal rate and regular rhythm.      Pulses: Normal pulses.      Heart sounds: Normal heart sounds.   Pulmonary:      Effort: Pulmonary effort is normal.      Breath sounds: Normal breath sounds.      Comments: E/U   Musculoskeletal:         General: Normal range of motion.      Right shoulder: Normal. No swelling, deformity or crepitus. Normal range of motion.      Cervical back: Normal range of motion and neck supple.   Lymphadenopathy:      Cervical: No cervical adenopathy.   Skin:     General: Skin is warm and dry.      Capillary Refill: Capillary refill takes 2 to 3 seconds.   Neurological:      Mental Status: He is alert and oriented to person, place, and time.   Psychiatric:         Mood and Affect: Mood normal.         Behavior: Behavior normal. Behavior is cooperative.         Thought Content: Thought content normal.         Judgment: Judgment normal.        Result Review  Data Reviewed:{ Labs  Result Review  Imaging  Med Tab  Media :23}            Vital Signs:   /80 (BP Location: Left arm, Patient  "Position: Sitting, Cuff Size: Adult)   Temp 97.8 °F (36.6 °C) (Temporal)   Ht 182.9 cm (72\")   Wt 99.8 kg (220 lb)   BMI 29.84 kg/m²         Requested Prescriptions     Signed Prescriptions Disp Refills   • lisinopril (PRINIVIL,ZESTRIL) 40 MG tablet 90 tablet 1     Sig: Take 1 tablet by mouth Daily.   • montelukast (SINGULAIR) 10 MG tablet 90 tablet 1     Sig: Take 1 tablet by mouth Every Night.   • Insulin Glargine (BASAGLAR KWIKPEN) 100 UNIT/ML injection pen 75 mL 1     Sig: Inject 38 Units under the skin into the appropriate area as directed Daily. 3 month supply   • Insulin Pen Needle (B-D ULTRAFINE III SHORT PEN) 31G X 8 MM misc 400 each 1     Si each 4 (Four) Times a Day.   • Continuous Blood Gluc Transmit (Dexcom G6 Transmitter) misc 2 each 1     Si each Every 3 (Three) Months.   • Continuous Blood Gluc Sensor (Dexcom G6 Sensor) 9 each 1     Sig: Every 10 (Ten) Days.   • rosuvastatin (Crestor) 5 MG tablet 90 tablet 1     Sig: Take 1 tablet by mouth Daily.   • Insulin Lispro, 1 Unit Dial, (HumaLOG KwikPen) 100 UNIT/ML solution pen-injector 90 mL 1     Sig: Inject 35 Units under the skin into the appropriate area as directed 3 (Three) Times a Day With Meals (33-35 units per dose as directed).     Routine medications provided by this office will also be refilled via pharmacy request.       Current Outpatient Medications:   •  Continuous Blood Gluc Transmit (Dexcom G6 Transmitter) misc, 1 each Every 3 (Three) Months., Disp: 2 each, Rfl: 1  •  fexofenadine (ALLEGRA) 60 MG tablet, Take 60 mg by mouth Daily., Disp: , Rfl:   •  fluticasone (FLONASE) 50 MCG/ACT nasal spray, 2 sprays into the nostril(s) as directed by provider Daily., Disp: , Rfl:   •  lisinopril (PRINIVIL,ZESTRIL) 40 MG tablet, Take 1 tablet by mouth Daily., Disp: 90 tablet, Rfl: 1  •  LORazepam (ATIVAN) 1 MG tablet, take 1 tablet by mouth 3 times a day as needed anxiety, Disp: 90 tablet, Rfl: 1  •  montelukast (SINGULAIR) 10 MG tablet, " Take 1 tablet by mouth Every Night., Disp: 90 tablet, Rfl: 1  •  Multiple Vitamins-Minerals (MULTIVITAMIN WITH MINERALS) tablet tablet, Take 1 tablet by mouth Daily., Disp: , Rfl:   •  traZODone (DESYREL) 100 MG tablet, Take 1.5 tablets by mouth Every Night., Disp: 45 tablet, Rfl: 1  •  Continuous Blood Gluc Sensor (Dexcom G6 Sensor), Every 10 (Ten) Days., Disp: 9 each, Rfl: 1  •  Insulin Glargine (BASAGLAR KWIKPEN) 100 UNIT/ML injection pen, Inject 38 Units under the skin into the appropriate area as directed Daily. 3 month supply, Disp: 75 mL, Rfl: 1  •  Insulin Lispro, 1 Unit Dial, (HumaLOG KwikPen) 100 UNIT/ML solution pen-injector, Inject 35 Units under the skin into the appropriate area as directed 3 (Three) Times a Day With Meals (33-35 units per dose as directed)., Disp: 90 mL, Rfl: 1  •  Insulin Pen Needle (B-D ULTRAFINE III SHORT PEN) 31G X 8 MM misc, 1 each 4 (Four) Times a Day., Disp: 400 each, Rfl: 1  •  rosuvastatin (Crestor) 5 MG tablet, Take 1 tablet by mouth Daily., Disp: 90 tablet, Rfl: 1     Assessment and Plan:      Assessment and Plan {CC Problem List  Visit Diagnosis  ROS  Review (Popup)  Health Maintenance  Quality  BestPractice  Medications  SmartSets  SnapShot Encounters  Media :23}     Problem List Items Addressed This Visit        Allergies and Adverse Reactions    Allergies (Chronic)    Overview     Chronic  2 sinus surgeries in the past - recurrent sinus infections           Relevant Medications    montelukast (SINGULAIR) 10 MG tablet       Cardiac and Vasculature    Essential hypertension (Chronic)    Current Assessment & Plan     Hypertension is improving with treatment.  Continue current treatment regimen.  Dietary sodium restriction.  Regular aerobic exercise.  Blood pressure will be reassessed at the next regular appointment.         Relevant Medications    lisinopril (PRINIVIL,ZESTRIL) 40 MG tablet    Other Relevant Orders    Comprehensive Metabolic Panel    Lipid Panel  With LDL / HDL Ratio    CBC (No Diff)    Mixed hyperlipidemia (Chronic)    Overview     Crestor in the past         Current Assessment & Plan     Lipid abnormalities stable - will restart crestor for cardiovascular prevention.     Check levels today.          Relevant Medications    rosuvastatin (Crestor) 5 MG tablet       Endocrine and Metabolic    Type 1 diabetes mellitus without complication (CMS/HCC) (Chronic)    Overview     Dx:   He has a Dexcom G6 to monitor glucose since 2019  3 sensors per month and 1 transmitter every 3 months - gets 3 months at a time.   Renal protection: lisinopril          Relevant Medications    Insulin Glargine (BASAGLAR KWIKPEN) 100 UNIT/ML injection pen    Insulin Pen Needle (B-D ULTRAFINE III SHORT PEN) 31G X 8 MM misc    Continuous Blood Gluc Transmit (Dexcom G6 Transmitter) misc    Continuous Blood Gluc Sensor (Dexcom G6 Sensor)    Insulin Lispro, 1 Unit Dial, (HumaLOG KwikPen) 100 UNIT/ML solution pen-injector    Other Relevant Orders    Hemoglobin A1c    C-Peptide    Microalbumin / Creatinine Urine Ratio - Urine, Clean Catch    Vitamin D deficiency (Chronic)    Current Assessment & Plan     Stable   Check lab for ongoing therapeutic drug monitoring          Relevant Orders    Vitamin D 25 hydroxy       Gastrointestinal Abdominal     Hepatitis C, chronic (G1b) (Chronic)    Overview     F3 fibrosis  Genotype 1b    Completed Harvoni 2017          Current Assessment & Plan     Follow up with GI yearly             Mental Health    Depression with anxiety (Chronic)    Overview     Dr Riggs   Also takes trazadone for insomnia          Current Assessment & Plan     Advised to use Ativan sparingly           Other Visit Diagnoses     Encounter to establish care    -  Primary    Chronic right shoulder pain        Relevant Orders    Ambulatory Referral to Orthopedic Surgery        Total time: 60 mins: 40 mins in room with patient.  He brought a considerable stack of health records that  will be scanned into media.   Additional time of 20 mins reviewing lab history, notes from prior PCP and ordering new supplies.     Follow Up {Instructions Charge Capture  Follow-up Communications :23}     Return in about 6 months (around 11/12/2021) for Annual physical.    Patient was given instructions and counseling regarding his condition or for health maintenance advice. Please see specific information pulled into the AVS if appropriate.    Dragon disclaimer:   Much of this encounter note is an electronic transcription/translation of spoken language to printed text. The electronic translation of spoken language may permit erroneous, or at times, nonsensical words or phrases to be inadvertently transcribed; Although I have reviewed the note for such errors, some may still exist.     Additional Patient Counseling:       Patient Instructions       As a Diabetic, you need a Diabetic Eye Exam YEARLY.  Please have your provider to send a copy of the note to our office.  Fax: [phone]    Your last A1C was   Lab Results   Component Value Date    HGBA1C 7.39 (H) 03/31/2017       This is a 3 month average of your blood sugar.  Your goal is to be LESS than 7%.         It's Nearly Summer Time!    Stay hydrated when outside  If your urine starts to get darker, you are not drinking enough     Protect yourself from ticks and mosquitos  Here are the best ingredients to look for:  · DEET (N,N-diethyl-m-toluamide or N,N-diethyl-3-methyl-benzamide)  · Picaridin  · Oil of lemon eucalyptus (p-menthane-3,8-diol or PMD)  Wear light-colored pants so you can spot ticks easier  If you use a permethrin product, ONLY apply to clothing    Practice Safe Sun!    · Use sun screen SPF >30 daily, reapply regularly per directions on package  · See dermatologist for skin check regularly  · Protect your eyes with sunglasses with UV protection    Poison Ivy  If you are going into areas that may have poison ivy, prepare with a product like  IvyX or other ivy blocker.  Wash your clothes and pets after being in an area with ivy when returning home. If you come in contact with poison ivy, try a product like Technu     Other things you should incorporate all year...     Diet:    • Eat vegetables, fruits, whole grain, low-fat dairy, poultry, fish, beans, nontropical vegetable oils, and nuts, but avoid red meat (i.e., Mediterranean-style diet, DASH [Dietary Approaches to Stop Hypertension] diet).  • Limit sugary drinks and sweets.  • Limit saturated and trans fat to 5% to 6% of calories.  • Limit sodium intake to 2,400 mg daily (about one teaspoon table salt [kosher/sea salt have less sodium per teaspoon]).  Weight loss / Calorie Counting Apps:    • Lose It!   • [facility] Pal   • Works great when you try it with a partner/ friend. It takes about 15 minutes a day but studies show that this simple method of monitoring your intake can help you achieve goals as it keeps you accountable.  I often will ask patients to try these apps just to get an idea of how much sodium and how many carbohydrates you are taking in.   Exercise:   • Engage in moderate-to-vigorous aerobic activity for at least 40 minutes (on average) three to four times each week.  Wearables:   • Activity tracker   • Step tracker: getting 7,500 steps daily can cut your cardiac risks by 44%   Bone Health:   • Https://www.nof.org/patients/treatment/nutrition/  • Routine weight bearing exercise

## 2021-05-14 ENCOUNTER — TELEPHONE (OUTPATIENT)
Dept: INTERNAL MEDICINE | Facility: CLINIC | Age: 51
End: 2021-05-14

## 2021-05-14 NOTE — TELEPHONE ENCOUNTER
Caller: Luis Quintana    Relationship: Self    Best call back number: [phone]     What medication are you requesting: BIAXIN, AMOXICILLIN, OR ZPACK    What are your current symptoms: YELLOW DISCHARGE, HOARSE, TIRED    How long have you been experiencing symptoms: COUPLE OF DAYS    Have you had these symptoms before:    [x] Yes  [] No    Have you been treated for these symptoms before:   [x] Yes  [] No    If a prescription is needed, what is your preferred pharmacy and phone number: [id number] - [place], [place] - [address] [address] [place] [address]  - [phone]  - [phone]      Additional notes:  PATIENT STATES THAT HE HAS A HISTORY OF SINUS INFECTIONS AND HE ALSO STATES THAT HE HAS MEDICATION ALLERGIES

## 2021-05-17 NOTE — TELEPHONE ENCOUNTER
Called Patient and asked how long he has been feeling , told me that he feels a lot better been taking mucinex d and vitamin c and has been doing better. I advised him that if he feels worse to go to the urgent care

## 2021-05-24 ENCOUNTER — OFFICE VISIT (OUTPATIENT)
Dept: ORTHOPEDIC SURGERY | Facility: CLINIC | Age: 51
End: 2021-05-24

## 2021-05-24 VITALS — BODY MASS INDEX: 29.8 KG/M2 | WEIGHT: 220 LBS | HEIGHT: 72 IN | TEMPERATURE: 97.3 F

## 2021-05-24 DIAGNOSIS — G89.29 CHRONIC RIGHT SHOULDER PAIN: Primary | ICD-10-CM

## 2021-05-24 DIAGNOSIS — M25.511 CHRONIC RIGHT SHOULDER PAIN: Primary | ICD-10-CM

## 2021-05-24 PROCEDURE — 99213 OFFICE O/P EST LOW 20 MIN: CPT | Performed by: NURSE PRACTITIONER

## 2021-05-24 PROCEDURE — 73030 X-RAY EXAM OF SHOULDER: CPT | Performed by: NURSE PRACTITIONER

## 2021-05-24 PROCEDURE — 20610 DRAIN/INJ JOINT/BURSA W/O US: CPT | Performed by: NURSE PRACTITIONER

## 2021-05-24 RX ORDER — METHYLPREDNISOLONE ACETATE 80 MG/ML
80 INJECTION, SUSPENSION INTRA-ARTICULAR; INTRALESIONAL; INTRAMUSCULAR; SOFT TISSUE
Status: COMPLETED | OUTPATIENT
Start: 2021-05-24 | End: 2021-05-24

## 2021-05-24 RX ORDER — LIDOCAINE HYDROCHLORIDE 20 MG/ML
2 INJECTION, SOLUTION EPIDURAL; INFILTRATION; INTRACAUDAL; PERINEURAL
Status: COMPLETED | OUTPATIENT
Start: 2021-05-24 | End: 2021-05-24

## 2021-05-24 RX ADMIN — METHYLPREDNISOLONE ACETATE 80 MG: 80 INJECTION, SUSPENSION INTRA-ARTICULAR; INTRALESIONAL; INTRAMUSCULAR; SOFT TISSUE at 16:19

## 2021-05-24 RX ADMIN — LIDOCAINE HYDROCHLORIDE 2 ML: 20 INJECTION, SOLUTION EPIDURAL; INFILTRATION; INTRACAUDAL; PERINEURAL at 16:19

## 2021-05-24 NOTE — PROGRESS NOTES
Patient: Luis Quintana    YOB: 1970    Medical Record Number: [id number]    Chief Complaints:   Right shoulder pain    History of Present Illness:     50 y.o. male patient who presents with a complaint of right shoulder pain.  He reports that the symptoms first started 3-4 months ago.  Denies injury.  He suspects his pain may be associated with lifting 25 pounds dumbbells.  Pain is primarily in the front of the shoulder and is described as moderate, intermittent and stabbing.  Denies mechanical symptoms.  The pain is worse with certain reaching and lifting movements.  He denies any alleviating factors.  Reports pain radiates through the biceps and down the arm.  He denies any weakness, numbness or paresthesias.    Allergies:   Allergies   Allergen Reactions   • Penicillins    • Sulfa Antibiotics Rash     Reaction happened when patient was a child. He thinks that he developed a rash but cannot definitively remember.        Medications:   Home Medications:    Current Outpatient Medications:   •  Continuous Blood Gluc Sensor (Dexcom G6 Sensor), Every 10 (Ten) Days., Disp: 9 each, Rfl: 1  •  Continuous Blood Gluc Transmit (Dexcom G6 Transmitter) misc, 1 each Every 3 (Three) Months., Disp: 2 each, Rfl: 1  •  fexofenadine (ALLEGRA) 60 MG tablet, Take 60 mg by mouth Daily., Disp: , Rfl:   •  fluticasone (FLONASE) 50 MCG/ACT nasal spray, 2 sprays into the nostril(s) as directed by provider Daily., Disp: , Rfl:   •  Insulin Glargine (BASAGLAR KWIKPEN) 100 UNIT/ML injection pen, Inject 38 Units under the skin into the appropriate area as directed Daily. 3 month supply, Disp: 75 mL, Rfl: 1  •  Insulin Lispro, 1 Unit Dial, (HumaLOG KwikPen) 100 UNIT/ML solution pen-injector, Inject 35 Units under the skin into the appropriate area as directed 3 (Three) Times a Day With Meals (33-35 units per dose as directed)., Disp: 90 mL, Rfl: 1  •  Insulin Pen Needle (B-D ULTRAFINE III SHORT PEN) 31G X 8 MM misc, 1 each 4  (Four) Times a Day., Disp: 400 each, Rfl: 1  •  lisinopril (PRINIVIL,ZESTRIL) 40 MG tablet, Take 1 tablet by mouth Daily., Disp: 90 tablet, Rfl: 1  •  LORazepam (ATIVAN) 1 MG tablet, take 1 tablet by mouth 3 times a day as needed anxiety, Disp: 90 tablet, Rfl: 1  •  montelukast (SINGULAIR) 10 MG tablet, Take 1 tablet by mouth Every Night., Disp: 90 tablet, Rfl: 1  •  Multiple Vitamins-Minerals (MULTIVITAMIN WITH MINERALS) tablet tablet, Take 1 tablet by mouth Daily., Disp: , Rfl:   •  rosuvastatin (Crestor) 5 MG tablet, Take 1 tablet by mouth Daily., Disp: 90 tablet, Rfl: 1  •  sertraline (ZOLOFT) 50 MG tablet, TAKE 1 TABLET BY MOUTH EVERY MORNING, Disp: 30 tablet, Rfl: 1  •  traZODone (DESYREL) 100 MG tablet, Take 1.5 tablets by mouth Every Night., Disp: 45 tablet, Rfl: 1    Past Medical History:   Diagnosis Date   • Diabetes mellitus (CMS/HCC)    • Hemorrhoids    • Hyperlipidemia    • Hypertension    • Infectious viral hepatitis     Hep C       Past Surgical History:   Procedure Laterality Date   • COLONOSCOPY W/ POLYPECTOMY N/A 2017    EH, polyps, tics, IH   • ENDOSCOPY N/A 2017    LA Grade B reflux esophagitis, 2 cm HH, gastritis       Social History     Occupational History   • Not on file   Tobacco Use   • Smoking status: Former Smoker     Types: Cigarettes     Quit date:      Years since quittin.3   • Smokeless tobacco: Never Used   Substance and Sexual Activity   • Alcohol use: Yes     Comment: occassional    • Drug use: No   • Sexual activity: Defer      Social History     Social History Narrative   • Not on file       Family History   Problem Relation Age of Onset   • Arrhythmia Mother    • Diabetes Maternal Grandfather      Review of Systems:      Constitutional: Denies fever, shaking or chills   Eyes: Denies change in visual acuity   HEENT: Denies nasal congestion or sore throat   Respiratory: Denies cough or shortness of breath   Cardiovascular: Denies chest pain or edema  Endocrine:  "Denies tremors, palpitations, intolerance of heat or cold, polyuria, polydipsia.  GI: Denies abdominal pain, nausea, vomiting, bloody stools or diarrhea  : Denies frequency, urgency, incontinence, retention, or nocturia.  Musculoskeletal: Denies numbness, tingling or loss of motor function except as above  Integument: Denies rash, lesion or ulceration   Neurologic: Denies headache or focal weakness, deficits  Heme: Denies spontaneous or excessive bleeding, epistaxis, hematuria, melena, fatigue, enlarged or tender lymph nodes.      All other pertinent positives and negatives as noted above in HPI.    Physical Exam:   50 y.o. male  Vitals:    05/24/21 1556   Temp: 97.3 °F (36.3 °C)   Weight: 99.8 kg (220 lb)   Height: 182.9 cm (72\")     General:  Patient is awake and alert.  Appears in no acute distress or discomfort.    Psych:  Affect and demeanor are appropriate.    Eyes:  Conjunctiva and sclera appear grossly normal.  Eyes track well and EOM seem to be intact.    Ears:  No gross abnormalities.  Hearing adequate for the exam.    Dentition:  No gross abnormalities noted.    Cardiovascular:  Regular rate and rhythm.    Lungs:  Good chest expansion.  Breathing unlabored.    Lymph:  No palpable adenopathy about neck or axilla.    Neck:  Supple.  Normal ROM.  Negative Spurling's for shoulder or arm pain.    Right shoulder is examined.  Skin is benign.  No gross abnormalities on inspection including any atrophy, swellings, or masses.  No palpable masses or adenopathy.  No focal areas of tenderness.  Full shoulder motion.  No evident instability or apprehension.  Positive O'Briens maneuver which reproduces the patient's typical pain.  Good strength with internal and external rotation.  Good strength in the rotator cuff, deltoid, biceps, triceps, and .  Intact sensation throughout the arm.  Brisk cap refill.  Palpable radial pulse.  Good skin turgor.          Radiology:  AP, scapular Y, and axillary views of the right " shoulder are ordered by myself and reviewed to evaluate the patient's complaint.  No comparison films are immediately available.  The x-rays show no obvious acute abnormalities, lesions, masses, significant glenohumeral degenerative changes, or other concerning findings.  The acromiohumeral interval is normal.  Glenoid version appears normal as well.    Assessment/Plan:   Chronic right shoulder pain, suspected glenoid labral tear    We discussed the suspected diagnosis as well as the natural history of this condition and treatment options.  I have recommended that we start with a conservative approach.,  If that fails, we may have to consider further work-up and potential surgery.  With regards to conservative options, we discussed appropriate activity modifications, icing as needed, anti-inflammatories, physical therapy, and injections.  He acknowledged understanding of the information.  He has opted for a steroid injection and physical therapy.  I have entered a referral to physical therapy.  Additionally, I have given him a prescription for a transdermal pain/anti-inflammatory cream [facility].  The risks and application instructions for this medication were discussed.  Patient has diabetes mellitus type 1.  He is aware the injection may transiently increase his glucose levels.  He will follow-up with me in 6 weeks.    AMOL Sahu    05/24/2021    CC to Delia Gresham III, NP-C    Large Joint Arthrocentesis: R glenohumeral  Date/Time: 5/24/2021 4:19 PM  Consent given by: patient  Site marked: site marked  Timeout: Immediately prior to procedure a time out was called to verify the correct patient, procedure, equipment, support staff and site/side marked as required   Supporting Documentation  Indications: pain   Procedure Details  Location: shoulder - R glenohumeral  Preparation: Patient was prepped and draped in the usual sterile fashion  Needle gauge: 21G.  Approach:  anterior  Medications administered: 80 mg methylPREDNISolone acetate 80 MG/ML; 2 mL lidocaine PF 2% 2 %  Patient tolerance: patient tolerated the procedure well with no immediate complications

## 2021-06-14 ENCOUNTER — HOSPITAL ENCOUNTER (OUTPATIENT)
Dept: PHYSICAL THERAPY | Facility: HOSPITAL | Age: 51
Setting detail: THERAPIES SERIES
Discharge: HOME | End: 2021-06-14

## 2021-06-14 DIAGNOSIS — M25.511 CHRONIC RIGHT SHOULDER PAIN: Primary | ICD-10-CM

## 2021-06-14 DIAGNOSIS — G89.29 CHRONIC RIGHT SHOULDER PAIN: Primary | ICD-10-CM

## 2021-06-14 DIAGNOSIS — M67.921 TENDINOPATHY OF RIGHT BICEPS TENDON: ICD-10-CM

## 2021-06-14 PROCEDURE — 97161 PT EVAL LOW COMPLEX 20 MIN: CPT

## 2021-06-14 PROCEDURE — 97530 THERAPEUTIC ACTIVITIES: CPT

## 2021-06-14 NOTE — THERAPY EVALUATION
Outpatient Physical Therapy Ortho Initial Evaluation  [facility]     Patient Name: Luis Quintana  : 1970  MRN: [id number]  Today's Date: 2021      Visit Date: 2021    Patient Active Problem List   Diagnosis   • Hypoglycemia   • Hepatitis C, chronic (G1b)   • Type 1 diabetes mellitus without complication (CMS/HCC)   • Chronic constipation   • History of hepatitis C virus infection   • Essential hypertension   • Mixed hyperlipidemia   • Allergies   • Vitamin D deficiency   • Depression with anxiety        Past Medical History:   Diagnosis Date   • Diabetes mellitus (CMS/HCC)    • Hemorrhoids    • Hyperlipidemia    • Hypertension    • Infectious viral hepatitis     Hep C        Past Surgical History:   Procedure Laterality Date   • COLONOSCOPY W/ POLYPECTOMY N/A 2017    EH, polyps, tics, IH   • ENDOSCOPY N/A 2017    LA Grade B reflux esophagitis, 2 cm HH, gastritis       Visit Dx:     ICD-10-CM ICD-9-CM   1. Chronic right shoulder pain  M25.511 719.41    G89.29 338.29   2. Tendinopathy of right biceps tendon  M67.921 727.9         Patient History     Row Name 21 1400 21 1222          History    Type of Pain  Shoulder pain  -RS  Shoulder pain  (Pended)   (Patient-Rptd)   -patient     Date Current Problem(s) Began  21  -RS  21  (Pended)   (Patient-Rptd)   -patient     Brief Description of Current Complaint  The pt is a 49 yo male who presents with R shoulder pain present since the beginning of the year approximately, he previous went shooting frequently however he has not done that since the beginning of COVID, he is right hand dominant and would get recoil through his R UE more than L. In the winter of COVID, he began doing exercises at home with 25 pound dumbbells (upright row, shoulder tress, tricep extension). He has increased pain with sleeping on his right side, reaching behind his back, stabilizing a drill, reaching forward (under the bed). His pain is  down the front of his R UE, no numbness or tingling but aches down the front of his R arm. He had an injection 3 weeks prior and he did not notice a change in pain.  -RS  Pain in the shoulder muscles when moving the certain way  (Pended)   (Patient-Rptd)   -patient     Patient/Caregiver Goals  Relieve pain;Return to prior level of function;Improve mobility;Improve strength  -RS  Relieve pain;Return to prior level of function;Improve mobility;Improve strength  (Pended)   (Patient-Rptd)   -patient     Hand Dominance  right-handed  -RS  right-handed  (Pended)   (Patient-Rptd)   -patient     Occupation/sports/leisure activities  , walking in a park  -RS  , walking in a park  (Pended)   (Patient-Rptd)   -patient     Patient seeing anyone else for problem(s)?  Conchis Keeon  -RS  Conchis Viverosyumi  (Pended)   (Patient-Rptd)   -patient     What clinical tests have you had for this problem?  X-ray  -RS  X-ray  (Pended)   (Patient-Rptd)   -patient        Pain     Pain Location  --  Shoulder  -RS     Pain at Present  --  0  -RS     Pain at Worst  --  9  -RS        Fall Risk Assessment    Any falls in the past year:  No  -RS  No  (Pended)   (Patient-Rptd)   -patient        Services    Are you currently receiving Home Health services  No  -RS  No  (Pended)   (Patient-Rptd)   -patient     Do you plan to receive Home Health services in the near future  No  -RS  No  (Pended)   (Patient-Rptd)   -patient        Daily Activities    Primary Language  Puerto Rican  -RS  Puerto Rican  (Pended)   (Patient-Rptd)   -patient     Action taken if English not primary language  pt fluent in English  -RS  --     How does patient learn best?  Reading  -RS  Reading  (Pended)   (Patient-Rptd)   -patient     Pt Participated in POC and Goals  Yes  -RS  --        Safety    Are you being hurt, hit, or frightened by anyone at home or in your life?  No  -RS  No  (Pended)   (Patient-Rptd)   -patient     Are you being neglected by a caregiver  No  -RS   No  (Pended)   (Patient-Rptd)   -patient     Have you had any of the following issues with  Depression  -RS  Depression  (Pended)   (Patient-Rptd)   -patient       User Key  (r) = Recorded By, (t) = Taken By, (c) = Cosigned By    Initials Name Provider Type    RS Paloma Samaniego, PT Physical Therapist    patient Luis Quintana --          PT Ortho     Row Name 06/14/21 1400       Posture/Observations    Alignment Options  Thoracic kyphosis;Rounded shoulders  -RS    Thoracic Kyphosis  Mild  -RS    Rounded Shoulders  Mild;Moderate  -RS       Special Tests/Palpation    Special Tests/Palpation  Shoulder  -RS       Shoulder Impingement/Rotator Cuff Special Tests    Full Can Test (RC Lesion)  Right:;Negative  -RS    Empty Can Test (RC Lesion)  Right:;Negative  -RS    Drop Arm Test (Full Thickness RC Lesion)  Right:;Negative  -RS    Lift-Off Test (Subscapularis Lesion)  Right: painful but not positive  -RS    Yergason Test (LH of Biceps Lesion)  Right:;Positive biceps pain  -RS       Shoulder Girdle Palpation    Shoulder Girdle Palpation?  Yes  -RS    AC Joint  Right:;Tender  -RS    Long Head of Biceps  Right:;Tender  -RS    Short Head of Biceps  Right:;Tender  -RS       General ROM    RT Upper Ext  Rt Shoulder ABduction;Rt Shoulder Flexion;Rt Shoulder Internal Rotation;Rt Shoulder External Rotation  -RS    GENERAL ROM COMMENTS  L UE WFL  -RS       Right Upper Ext    Rt Shoulder Abduction AROM  0-158 with pain at end ROM  -RS    Rt Shoulder Flexion AROM  0-160 with pain at end ROM  -RS    Rt Shoulder External Rotation PROM  at 90- 52 deg  -RS    Rt Shoulder Internal Rotation PROM  at 90- 55 deg  -RS       MMT (Manual Muscle Testing)    Rt Upper Ext  Rt Shoulder Flexion;Rt Shoulder ABduction;Rt Shoulder Internal Rotation;Rt Shoulder External Rotation;Rt Elbow/Forearm WNL;Rt Elbow Flexion;Rt Elbow Extension  -RS    Lt Upper Ext  Lt Shoulder Flexion;Lt Shoulder ABduction;Lt Shoulder Internal Rotation;Lt Shoulder External  Rotation;Lt Elbow/Forearm WNL  -RS       MMT Right Upper Ext    Rt Shoulder Flexion MMT, Gross Movement  (4/5) good  -RS    Rt Shoulder ABduction MMT, Gross Movement  (4-/5) good minus  -RS    Rt Shoulder Internal Rotation MMT, Gross Movement  (4+/5) good plus  -RS    Rt Shoulder External Rotation MMT, Gross Movement  (4/5) good  -RS    Rt Elbow Flexion MMT, Gross Movement:  (4+/5) good plus  -RS    Rt Elbow Extension MMT, Gross Movement:  (4+/5) good plus  -RS       MMT Left Upper Ext    Lt Shoulder Flexion MMT, Gross Movement  (5/5) normal  -RS    Lt Shoulder ABduction MMT, Gross Movement  (4+/5) good plus  -RS    Lt Shoulder Internal Rotation MMT, Gross Movement  (5/5) normal  -RS    Lt Shoulder External Rotation MMT, Gross Movement  (4/5) good  -RS      User Key  (r) = Recorded By, (t) = Taken By, (c) = Cosigned By    Initials Name Provider Type    RS Paloma Samaniego, PT Physical Therapist                      Therapy Education  Education Details: Access Code [email or web address] Role of outpatient PT, POC, differential diagnosis, initial HEP, expectations, goals, anatomy. Used shoulder model and visual aids for teaching of anatomy  Given: HEP, Symptoms/condition management  Program: New  How Provided: Verbal, Demonstration, Written  Provided to: Patient  Level of Understanding: Verbalized, Demonstrated     PT OP Goals     Row Name 06/14/21 1400          PT Short Term Goals    STG Date to Achieve  07/05/21  -RS     STG 1  The pt will demonstrate full and painfree R shoulder AROM flex and Abduction without increased pain to facilitate improved functional reach.  -RS     STG 1 Progress  New  -RS     STG 2  The pt will report at least 50% improvement in waking in pain with sleep to facilitate improved restorative sleep pattern.  -RS     STG 2 Progress  New  -RS        Long Term Goals    LTG Date to Achieve  08/13/21  -RS     LTG 1  The pt will report at least 75% improvement in waking in pain with sleep to facilitate  improved restorative sleep pattern.  -RS     LTG 2  The pt will demonstrate IND and compliant with HEP focused on  return to PLOF and IND condition management.  -RS     LTG 2 Progress  New  -RS     LTG 3  The pt will demonstrate R shoulder FIR to at least L2 withou tincreased pain to facilitate improved dressing and self care ADL performance.  -RS     LTG 3 Progress  New  -RS     LTG 4  The pt will return to recreational activity performance 3 days per week, including resistance training, without increased pain in R shoulder greater than 2/10.  -RS     LTG 4 Progress  New  -RS        Time Calculation    PT Goal Re-Cert Due Date  09/12/21  -RS       User Key  (r) = Recorded By, (t) = Taken By, (c) = Cosigned By    Initials Name Provider Type    RS Paloma Samaniego, PT Physical Therapist          PT Assessment/Plan     Row Name 06/14/21 1500          PT Assessment    Functional Limitations  Performance in sport activities;Performance in self-care ADL;Performance in leisure activities;Limitations in functional capacity and performance;Limitation in home management  -RS     Impairments  Posture;Range of motion;Poor body mechanics;Pain;Muscle strength;Joint mobility  -RS     Assessment Comments  Luis Quintana is a 50 y.o. male referred to physical therapy for R shoulder pain present a few months, he is unable to determine a causative factor but the pain increased after trying to workout at home. He presents with a stable clinical presentation, along with comorbidities of diabetes and no remarkable personal factors that may impact his progress in the plan of care. Pt presents today with pain with full R shoulder flex and AB, pain with functional internal rotation R shoulder, dec R shoulder abduction, Er strength, pain with AC joint palpation and compression, pain with biceps palpation and yergason special testing . his signs and symptoms are consistent with a physical therapy diagnosis of AC joint irritation with biceps  tendinopathy. The previous impairments limit his ability to wash back, sleep, dress upper body, participate . The pt self scores 22% disability on the QuickDASH (0=no disability).Pt will benefit from skilled PT to address the previous impairments and return to PLOF.  -RS     Please refer to paper survey for additional self-reported information  Yes  -RS     Rehab Potential  Good  -RS     Patient/caregiver participated in establishment of treatment plan and goals  Yes  -RS     Patient would benefit from skilled therapy intervention  Yes  -RS        PT Plan    PT Frequency  2x/week  -RS     Predicted Duration of Therapy Intervention (PT)  8 weeks, total of 14 sessions  -RS     Planned CPT's?  PT EVAL LOW COMPLEXITY: 02026;PT RE-EVAL: 77488;PT THER PROC EA 15 MIN: 61621;PT THER ACT EA 15 MIN: 88043;PT MANUAL THERAPY EA 15 MIN: 73402;PT NEUROMUSC RE-EDUCATION EA 15 MIN: 85483;PT SELF CARE/HOME MGMT/TRAIN EA 15: 74774;PT HOT OR COLD PACK TREAT MCARE;PT ELECTRICAL STIM UNATTEND: ;PT ULTRASOUND EA 15 MIN: 58766  -RS     PT Plan Comments  Assess tolerance for initial HEP, initiate manual therapy focused on improved emchanics at R shoulder (GH, ST, AC joints),  consider supine over noodle, continue to educate about avoiding provocative positions for the next 2-3 weeks as we focus on mechanics and posterior cuff strength.  -RS       User Key  (r) = Recorded By, (t) = Taken By, (c) = Cosigned By    Initials Name Provider Type    RS Paloma Samaniego, PT Physical Therapist            OP Exercises     Row Name 06/14/21 1400             Total Minutes    67541 - PT Therapeutic Activity Minutes  15  -RS         Exercise 1    Exercise Name 1  supine B shoulder ER in painfree ROM  -RS      Cueing 1  Verbal;Demo  -RS      Sets 1  2  -RS      Reps 1  10  -RS      Time 1  RTB  -RS         Exercise 2    Exercise Name 2  low doorway stretch- gentle  -RS      Cueing 2  Verbal;Demo  -RS      Reps 2  3  -RS      Time 2  20s  -RS          Exercise 3    Exercise Name 3  serratus punch  -RS      Cueing 3  Verbal;Demo  -RS      Sets 3  2  -RS      Reps 3  10  -RS      Additional Comments  instructed to use a light weight at home (5ish pounds or less)  -RS         Exercise 4    Exercise Name 4  rows supinated  -RS      Cueing 4  Verbal;Demo  -RS      Sets 4  2  -RS      Reps 4  10  -RS      Additional Comments  BTB- cues to avoid UT comp  -RS         Exercise 5    Exercise Name 5  plus education- see ther ed tab  -RS        User Key  (r) = Recorded By, (t) = Taken By, (c) = Cosigned By    Initials Name Provider Type    RS Paloma Samaniego, PT Physical Therapist                        Outcome Measure Options: Quick DASH  Quick DASH  Open a tight or new jar.: No Difficulty  Do heavy household chores (e.g., wash walls, wash floors): Mild Difficulty  Carry a shopping bag or briefcase: No Difficulty  Wash your back: Severe Difficulty  Use a knife to cut food: No Difficulty  Recreational activities in which you take some force or impact through your arm, should or hand (e.g. golf, hammering, tennis, etc.): Mild Difficulty  During the past week, to what extent has your arm, shoulder, or hand problem interfered with your normal social activites with family, friends, neighbors or groups?: Not at all  During the past week, were you limited in your work or other regular daily activities as a result of your arm, shoulder or hand problem?: Not limited at all  Arm, Shoulder, or hand pain: Severe  Tingling (pins and needles) in your arm, shoulder, or hand: None  During the past week, how much difficulty have you had sleeping because of the pain in your arm, shoulder or hand?: Moderate Difficiculty  Number of Questions Answered: 11  Quick DASH Score: 22.73         Time Calculation:     Start Time: 1414  Stop Time: 1452  Time Calculation (min): 38 min  Timed Charges  97089 - PT Therapeutic Activity Minutes: 15  Total Minutes  Timed Charges Total Minutes: 15   Total  Minutes: 15     Therapy Charges for Today     Code Description Service Date Service Provider Modifiers Qty    56219217046 HC PT THERAPEUTIC ACT EA 15 MIN 6/14/2021 Paloma Samaniego, PT GP 1    08611516903 HC PT EVAL LOW COMPLEXITY 2 6/14/2021 Paloma Samaniego, PT GP 1          PT G-Codes  Outcome Measure Options: Quick DASH  Quick DASH Score: 22.73         Paloma Samaniego, PT  6/14/2021

## 2021-06-17 ENCOUNTER — TELEPHONE (OUTPATIENT)
Dept: INTERNAL MEDICINE | Facility: CLINIC | Age: 51
End: 2021-06-17

## 2021-06-17 ENCOUNTER — HOSPITAL ENCOUNTER (OUTPATIENT)
Dept: PHYSICAL THERAPY | Facility: HOSPITAL | Age: 51
Setting detail: THERAPIES SERIES
Discharge: HOME | End: 2021-06-17

## 2021-06-17 DIAGNOSIS — M67.921 TENDINOPATHY OF RIGHT BICEPS TENDON: ICD-10-CM

## 2021-06-17 DIAGNOSIS — M25.511 CHRONIC RIGHT SHOULDER PAIN: Primary | ICD-10-CM

## 2021-06-17 DIAGNOSIS — G89.29 CHRONIC RIGHT SHOULDER PAIN: Primary | ICD-10-CM

## 2021-06-17 PROCEDURE — 97140 MANUAL THERAPY 1/> REGIONS: CPT

## 2021-06-17 PROCEDURE — 97110 THERAPEUTIC EXERCISES: CPT

## 2021-06-17 NOTE — TELEPHONE ENCOUNTER
Caller: Luis Quintana    Relationship: Self    Best call back number: [phone]    Which medication are you concerned about: Continuous Blood Gluc Transmit (Dexcom G6 Transmitter) misc, Continuous Blood Gluc Sensor (Dexcom G6 Sensor)    Who prescribed you this medication: MARLEY GUILLERMO    What are your concerns: PATIENT STATED THAT [facility] SENT A PRIOR AUTHORIZATION FORM OVER FOR THE ABOVE PRESCRIPTIONS SEVERAL WEEKS AGO.  THE PATIENT WAS CALLING TO CHECK ON THE STATUS OF THIS SINCE HE IS ALMOST OUT OF THE SENSORS AND TRANSMITTERS AND NEEDS THESE REFILLED.     PLEASE CALL AND ADVISE     ATTEMPTED TO WARM TRANSFER

## 2021-06-17 NOTE — THERAPY TREATMENT NOTE
Outpatient Physical Therapy Ortho Treatment Note  [facility]     Patient Name: Luis Quintana  : 1970  MRN: [id number]  Today's Date: 2021      Visit Date: 2021    Visit Dx:    ICD-10-CM ICD-9-CM   1. Chronic right shoulder pain  M25.511 719.41    G89.29 338.29   2. Tendinopathy of right biceps tendon  M67.921 727.9       Patient Active Problem List   Diagnosis   • Hypoglycemia   • Hepatitis C, chronic (G1b)   • Type 1 diabetes mellitus without complication (CMS/HCC)   • Chronic constipation   • History of hepatitis C virus infection   • Essential hypertension   • Mixed hyperlipidemia   • Allergies   • Vitamin D deficiency   • Depression with anxiety        Past Medical History:   Diagnosis Date   • Diabetes mellitus (CMS/HCC)    • Hemorrhoids    • Hyperlipidemia    • Hypertension    • Infectious viral hepatitis     Hep C        Past Surgical History:   Procedure Laterality Date   • COLONOSCOPY W/ POLYPECTOMY N/A 2017    EH, polyps, tics, IH   • ENDOSCOPY N/A 2017    LA Grade B reflux esophagitis, 2 cm HH, gastritis                       PT Assessment/Plan     Row Name 21 1400          PT Assessment    Assessment Comments  Luis returns for fist follow up sessiona fter initial eval reporting good compliance with HEP, no significant changes in pain. He continues to report significantly increased pain with FIR and abduction. Initiated manual therapy with good tolerance and progressed therex program focused on rotator cuff activation below 90 degrees of flexion in painfree ranges of motion. Education provided about avoiding reproduciton of pain during therex, however expectations for muscle burning and low level discomfort while rehabing, pt reports understanding. Did not progress HEP this date, plan to assess tolerance for previous session and progress HEP as appropriate, reviewed pacing and frequency. Pt remains motivated to participate with PT and is a good candidate.   -RS         PT Plan    PT Plan Comments  Assess response to manual therapy and therex progression, progress HEP as appropriate, continue manual if beneficial, focus on cuff strength and painfree mobility  -RS       User Key  (r) = Recorded By, (t) = Taken By, (c) = Cosigned By    Initials Name Provider Type    RS Paloma Samaniego, PT Physical Therapist            OP Exercises     Row Name 06/17/21 1400 06/17/21 1300          Subjective Comments    Subjective Comments  Been doing HEP, when reaches behind back to get something out of pocket and it hurt a lot to reach behind back  -RS  --        Subjective Pain    Able to rate subjective pain?  yes  -RS  --     Subjective Pain Comment  none at rest, hurts with movement  -RS  --        Total Minutes    02123 - PT Therapeutic Exercise Minutes  25  -RS  --     99605 - PT Manual Therapy Minutes  15  -RS  --  -RS        Exercise 1    Exercise Name 1  supine B shoulder ER in painfree ROM  -RS  --     Cueing 1  Verbal;Demo  -RS  --     Sets 1  2  -RS  --     Reps 1  10  -RS  --     Time 1  RTB  -RS  --     Additional Comments  over noodle  -RS  --        Exercise 2    Exercise Name 2  --  -RS  --     Cueing 2  --  -RS  --     Reps 2  --  -RS  --     Time 2  --  -RS  --        Exercise 3    Exercise Name 3  serratus punch  -RS  --     Cueing 3  Verbal;Demo  -RS  --     Sets 3  2  -RS  --     Reps 3  10  -RS  --     Additional Comments  3#  -RS  --        Exercise 4    Exercise Name 4  rows supinated/ shoulder extension externally rotated  -RS  --     Cueing 4  Verbal;Demo  -RS  --     Sets 4  2  -RS  --     Reps 4  10  -RS  --     Additional Comments  BTB- cues to avoid UT comp  -RS  --        Exercise 5    Exercise Name 5  supine over noodle  -RS  --     Cueing 5  Verbal;Demo  -RS  --     Time 5  2 min  -RS  --     Additional Comments  focused on chest opening  -RS  --        Exercise 6    Exercise Name 6  supine HA  -RS  --     Cueing 6  Verbal;Demo  -RS  --     Sets 6  2  -RS   --     Reps 6  10  -RS  --     Additional Comments  GTB- over nodle  -RS  --        Exercise 7    Exercise Name 7  beach chair ER/IR sstretch  -RS  --     Cueing 7  Verbal;Demo  -RS  --     Reps 7  10  -RS  --     Time 7  5s  -RS  --     Additional Comments  sub 4/10 pain ROM  -RS  --        Exercise 8    Exercise Name 8  prone ext and HA  -RS  --     Cueing 8  Verbal;Tactile  -RS  --     Reps 8  8  -RS  --     Time 8  5s  -RS  --     Additional Comments  cue at scap to avoid copensation  -RS  --       User Key  (r) = Recorded By, (t) = Taken By, (c) = Cosigned By    Initials Name Provider Type    RS Paloma Samaniego PT Physical Therapist                      Manual Rx (last 36 hours)      Manual Treatments     Row Name 06/17/21 1400 06/17/21 1300          Total Minutes    86533 - PT Manual Therapy Minutes  15  -RS  --  -RS        Manual Rx 1    Manual Rx 1 Location  R shoulder- inf glide with flex, post GH glides  -RS  --  -RS        Manual Rx 2    Manual Rx 2 Location  STM R biceps, pects  -RS  --  -RS        Manual Rx 3    Manual Rx 3 Location  R clav glides  -RS  --  -RS     Manual Rx 3 Type  sup/inf  -RS  --  -RS     Manual Rx 3 Grade  II-III  -RS  --  -RS       User Key  (r) = Recorded By, (t) = Taken By, (c) = Cosigned By    Initials Name Provider Type    RS Paloma Samaniego PT Physical Therapist              Therapy Education  Education Details: tissue healing, expectations, HEP, activity modification  Given: HEP, Symptoms/condition management  Program: Reinforced  How Provided: Verbal, Demonstration  Provided to: Patient  Level of Understanding: Verbalized, Demonstrated              Time Calculation:   Start Time: 1431  Stop Time: 1512  Time Calculation (min): 41 min  Timed Charges  71857 - PT Therapeutic Exercise Minutes: 25  00911 - PT Manual Therapy Minutes: 15  Total Minutes  Timed Charges Total Minutes: 40   Total Minutes: 40  Therapy Charges for Today     Code Description Service Date Service  Provider Modifiers Qty    39343608459  PT THER PROC EA 15 MIN 6/17/2021 Paloma Samaniego, PT GP 2    [id number]  PT MANUAL THERAPY EA 15 MIN 6/17/2021 Paloma Samaniego, PT GP 1                    Paloma Samaniego, PT  6/17/2021

## 2021-06-18 NOTE — TELEPHONE ENCOUNTER
I called [employer] to see if we can expedite this, but it will still take 24 hours for determination.

## 2021-06-23 ENCOUNTER — HOSPITAL ENCOUNTER (OUTPATIENT)
Dept: PHYSICAL THERAPY | Facility: HOSPITAL | Age: 51
Setting detail: THERAPIES SERIES
Discharge: HOME | End: 2021-06-23

## 2021-06-23 DIAGNOSIS — M25.511 CHRONIC RIGHT SHOULDER PAIN: Primary | ICD-10-CM

## 2021-06-23 DIAGNOSIS — M67.921 TENDINOPATHY OF RIGHT BICEPS TENDON: ICD-10-CM

## 2021-06-23 DIAGNOSIS — G89.29 CHRONIC RIGHT SHOULDER PAIN: Primary | ICD-10-CM

## 2021-06-23 PROCEDURE — 97140 MANUAL THERAPY 1/> REGIONS: CPT

## 2021-06-23 PROCEDURE — 97110 THERAPEUTIC EXERCISES: CPT

## 2021-06-23 NOTE — THERAPY TREATMENT NOTE
Outpatient Physical Therapy Ortho Treatment Note  [facility]     Patient Name: Luis Quintana  : 1970  MRN: [id number]  Today's Date: 2021      Visit Date: 2021    Visit Dx:    ICD-10-CM ICD-9-CM   1. Chronic right shoulder pain  M25.511 719.41    G89.29 338.29   2. Tendinopathy of right biceps tendon  M67.921 727.9       Patient Active Problem List   Diagnosis   • Hypoglycemia   • Hepatitis C, chronic (G1b)   • Type 1 diabetes mellitus without complication (CMS/HCC)   • Chronic constipation   • History of hepatitis C virus infection   • Essential hypertension   • Mixed hyperlipidemia   • Allergies   • Vitamin D deficiency   • Depression with anxiety        Past Medical History:   Diagnosis Date   • Diabetes mellitus (CMS/HCC)    • Hemorrhoids    • Hyperlipidemia    • Hypertension    • Infectious viral hepatitis     Hep C        Past Surgical History:   Procedure Laterality Date   • COLONOSCOPY W/ POLYPECTOMY N/A 2017    EH, polyps, tics, IH   • ENDOSCOPY N/A 2017    LA Grade B reflux esophagitis, 2 cm HH, gastritis                       PT Assessment/Plan     Row Name 21 1700          PT Assessment    Assessment Comments  Patient continues to tolerate manual therapy to improve GH joint inferior glide when performing shoulder flexion. He continues to experience increased pain over anterior/lateral arm along biceps with ER/IR. Patient requires frequent verbal cues to decreased UT activation when performing dorsal scapular strengthening interventions. Patient continues to require skilled PTx to address functional ROM defects and increase pain leading to reduced pain and improved activity tolerance to quality of life.  -LAI        PT Plan    PT Plan Comments  Review response to around the world and diagonals. Add AAROM for ER and RTC strengthening  -LAI       User Key  (r) = Recorded By, (t) = Taken By, (c) = Cosigned By    Initials Name Provider Type    Alejandra Brewster  [place], PT Physical Therapist            OP Exercises     Row Name 06/23/21 1500 06/23/21 1446 06/23/21 1400       Subjective Comments    Subjective Comments  --  --  EP consistently.  -LAI       Subjective Pain    Able to rate subjective pain?  --  --  yes  -LAI    Pre-Treatment Pain Level  --  --  0  -LAI    Subjective Pain Comment  --  --  pain only when performing specific movements such as attempting to reach behind back   -LAI       Total Minutes    05389 - PT Therapeutic Exercise Minutes  --  37  -LAI  --  -LAI    41533 - PT Manual Therapy Minutes  --  -LAI  10  -LAI  --       Exercise 1    Exercise Name 1  --  --  supine B shoulder ER in painfree ROM  -LAI    Cueing 1  --  --  Verbal;Demo  -LAI    Sets 1  --  --  2  -LAI    Reps 1  --  --  10  -LAI    Time 1  --  --  RTB  -LAI    Additional Comments  --  --  over noodle  -LAI       Exercise 2    Exercise Name 2  --  --  low doorway stretch- gentle  -LAI    Cueing 2  --  --  Verbal;Demo  -LAI    Reps 2  --  --  3  -LAI    Time 2  --  --  20s  -LAI       Exercise 3    Exercise Name 3  --  --  serratus punch  -LAI    Cueing 3  --  --  Verbal;Demo  -LAI    Sets 3  --  --  2  -LAI    Reps 3  --  --  10  -LAI    Additional Comments  --  --  4#  -LAI       Exercise 4    Exercise Name 4  --  --  rows supinated/ shoulder extension externally rotated  -LAI    Cueing 4  --  --  Verbal;Demo  -LAI    Sets 4  --  --  2  -LAI    Reps 4  --  --  10  -LAI       Exercise 5    Exercise Name 5  --  --  supine over noodle  -LAI    Cueing 5  --  --  Verbal;Demo  -LAI    Time 5  --  --  2 min  -LAI    Additional Comments  --  --  focused on chest opening  -LAI       Exercise 6    Exercise Name 6  --  --  supine HA  -LAI    Cueing 6  --  --  Verbal;Demo  -LAI    Sets 6  --  --  2  -LAI    Reps 6  --  --  10  -LAI    Additional Comments  --  --  GTB- over noodle  -LAI       Exercise 7    Exercise Name 7  --  --  beach chair ER/IR sstretch  -LAI    Cueing 7  --  --  Verbal;Demo  -LAI    Reps 7  --  --  10  -LAI    Time 7   --  --  5s  -LAI       Exercise 9    Exercise Name 9  --  --  UBE   -LAI    Cueing 9  --  --  Verbal  -LAI    Time 9  --  --  4  -LAI    Additional Comments  --  --  2' forward/2' backward  -LAI       Exercise 10    Exercise Name 10  --  --  Around the [place]  -LAI    Cueing 10  --  --  Verbal;Demo  -LAI    Sets 10  --  --  1  -LAI    Reps 10  --  --  15  -LAI    Additional Comments  --  --  weighted blue ball  -LAI       Exercise 11    Exercise Name 11  --  --  supine diagonals   -LAI    Cueing 11  --  --  Verbal  -LAI    Sets 11  --  --  1  -LAI    Reps 11  --  --  10 ea  -LAI    Additional Comments  --  --  RTB  -LAI      User Key  (r) = Recorded By, (t) = Taken By, (c) = Cosigned By    Initials Name Provider Type    Alejandra Brewster, PT Physical Therapist                      Manual Rx (last 36 hours)      Manual Treatments     Row Name 06/23/21 1500 06/23/21 1446          Total Minutes    68973 - PT Manual Therapy Minutes  --  -LAI  10  -LAI        Manual Rx 1    Manual Rx 1 Location  --  -LAI  R shoulder- inf glide with flex, post GH glides  -LAI        Manual Rx 2    Manual Rx 2 Location  --  -LAI  STM R biceps, pects  -LAI        Manual Rx 3    Manual Rx 3 Location  --  -LAI  R clav glides  -LAI     Manual Rx 3 Type  --  -LAI  sup/inf  -LAI     Manual Rx 3 Grade  --  -LAI  II-III  -LAI       User Key  (r) = Recorded By, (t) = Taken By, (c) = Cosigned By    Initials Name Provider Type    Alejandra Brewster, PT Physical Therapist          PT OP Goals     Row Name 06/23/21 1500          PT Short Term Goals    STG Date to Achieve  07/05/21  -     STG 1  The pt will demonstrate full and painfree R shoulder AROM flex and Abduction without increased pain to facilitate improved functional reach.  -     STG 1 Progress  Ongoing  -     STG 2  The pt will report at least 50% improvement in waking in pain with sleep to facilitate improved restorative sleep pattern.  -     STG 2 Progress  New  -        Long Term Goals     LTG Date to Achieve  08/13/21  -     LTG 1  The pt will report at least 75% improvement in waking in pain with sleep to facilitate improved restorative sleep pattern.  -     LTG 2  The pt will demonstrate IND and compliant with HEP focused on  return to PLOF and IND condition management.  -     LTG 2 Progress  New  -     LTG 3  The pt will demonstrate R shoulder FIR to at least L2 withou tincreased pain to facilitate improved dressing and self care ADL performance.  -     LTG 3 Progress  New  -     LTG 4  The pt will return to recreational activity performance 3 days per week, including resistance training, without increased pain in R shoulder greater than 2/10.  -     LTG 4 Progress  New  -       User Key  (r) = Recorded By, (t) = Taken By, (c) = Cosigned By    Initials Name Provider Type    Alejandra Brewster, PT Physical Therapist          Therapy Education  Education Details: Educated on purpose of new interventions and importance of postural awareness and HEP compliance              Time Calculation:   Start Time: 1443  Stop Time: 1530  Time Calculation (min): 47 min  Timed Charges  63825 - PT Therapeutic Exercise Minutes: 37  94002 - PT Manual Therapy Minutes: 10  Total Minutes  Timed Charges Total Minutes: 47   Total Minutes: 47  Therapy Charges for Today     Code Description Service Date Service Provider Modifiers Qty    45271514586  PT THER PROC EA 15 MIN 6/23/2021 Alejandra Romero, PT GP 2    [id number]  PT MANUAL THERAPY EA 15 MIN 6/23/2021 Alejandra Romero, PT GP 1                    Alejandra Romero, PT  6/23/2021

## 2021-06-29 ENCOUNTER — TELEPHONE (OUTPATIENT)
Dept: INTERNAL MEDICINE | Facility: CLINIC | Age: 51
End: 2021-06-29

## 2021-07-01 NOTE — TELEPHONE ENCOUNTER
Caller: Luis Quintana    Relationship: Self    Best call back number:     What is the best time to reach you: ANYTIME    Who are you requesting to speak with (clinical staff, provider,  specific staff member): AIDA    Do you know the name of the person who called:     What was the call regarding: PATIENT HAS SOME QUESTIONS ABOUT A PRIOR AUTHORIZATION FOR DEXCOM GLUCOSE MONITOR SENSORS.     Do you require a callback: YES

## 2021-07-01 NOTE — TELEPHONE ENCOUNTER
Call made to Mr. Smith he stated he only received a Dexcom Transmitter and he will still need a PA for the Sensor.    He also indicated he want to have  90 day supply.    I told him I would further speak to Janice and our office would follow back up with him

## 2021-07-01 NOTE — TELEPHONE ENCOUNTER
I left a message with [facility] to let me know what this still needs.  I have spoken with 3 or 4 people at [facility] since 6/17 regarding PA on the Dexcom G6 sensors and was advised on 6/30/21 that the sensors and transmitters were both approved.    Spontaneous, unlabored and symmetrical

## 2021-07-02 NOTE — TELEPHONE ENCOUNTER
Pt was informed PA from [employer] was approved for Sensors & Transmitter.    He was told this approval was faxed to [facility] and if he has any questions he may address them with the pharmacy or [employer].     Pt verbalized understanding.

## 2021-07-06 ENCOUNTER — HOSPITAL ENCOUNTER (OUTPATIENT)
Dept: PHYSICAL THERAPY | Facility: HOSPITAL | Age: 51
Setting detail: THERAPIES SERIES
Discharge: HOME | End: 2021-07-06

## 2021-07-06 DIAGNOSIS — M67.921 TENDINOPATHY OF RIGHT BICEPS TENDON: ICD-10-CM

## 2021-07-06 DIAGNOSIS — G89.29 CHRONIC RIGHT SHOULDER PAIN: Primary | ICD-10-CM

## 2021-07-06 DIAGNOSIS — M25.511 CHRONIC RIGHT SHOULDER PAIN: Primary | ICD-10-CM

## 2021-07-06 PROCEDURE — 97110 THERAPEUTIC EXERCISES: CPT

## 2021-07-06 PROCEDURE — 97140 MANUAL THERAPY 1/> REGIONS: CPT

## 2021-07-06 NOTE — THERAPY TREATMENT NOTE
Outpatient Physical Therapy Ortho Treatment Note  [facility]     Patient Name: Luis Quintana  : 1970  MRN: [id number]  Today's Date: 2021      Visit Date: 2021    Visit Dx:    ICD-10-CM ICD-9-CM   1. Chronic right shoulder pain  M25.511 719.41    G89.29 338.29   2. Tendinopathy of right biceps tendon  M67.921 727.9       Patient Active Problem List   Diagnosis   • Hypoglycemia   • Hepatitis C, chronic (G1b)   • Type 1 diabetes mellitus without complication (CMS/HCC)   • Chronic constipation   • History of hepatitis C virus infection   • Essential hypertension   • Mixed hyperlipidemia   • Allergies   • Vitamin D deficiency   • Depression with anxiety        Past Medical History:   Diagnosis Date   • Diabetes mellitus (CMS/HCC)    • Hemorrhoids    • Hyperlipidemia    • Hypertension    • Infectious viral hepatitis     Hep C        Past Surgical History:   Procedure Laterality Date   • COLONOSCOPY W/ POLYPECTOMY N/A 2017    EH, polyps, tics, IH   • ENDOSCOPY N/A 2017    LA Grade B reflux esophagitis, 2 cm HH, gastritis                       PT Assessment/Plan     Row Name 21 1700          PT Assessment    Assessment Comments  Pt reports slight decrease in pain with sleeping however pain with functional reach remains. Discussed progress toward current goals as well as  continued POC with PT, he is agreeable. Reinforced idea that pt should avoid pain provocation when able (avoid repetitive lifting, reaching, etc) even if it feels like it is doing something as it is likely contributing to tendinitis. Added supine PNF star, standing serratus punch/bear hug with GTB, inf trap setting at wall. Noted decreased strength inf trap when attemptom wall lift off, discussed indications and role with pt who was receptive.  -RS        PT Plan    PT Plan Comments  Progress to include prone scap/flex, update HEP  -RS       User Key  (r) = Recorded By, (t) = Taken By, (c) = Cosigned By    Initials  Name Provider Type    RS Paloma Samaniego, PT Physical Therapist            OP Exercises     Row Name 07/06/21 1600             Subjective Comments    Subjective Comments  Pt reports some improvement in sleep but otherwise no change in pain/function  -RS         Subjective Pain    Able to rate subjective pain?  yes  -RS         Total Minutes    58465 - PT Therapeutic Exercise Minutes  32  -RS      41958 - PT Manual Therapy Minutes  12  -RS         Exercise 1    Exercise Name 1  PNF star  -RS      Cueing 1  Verbal;Demo  -RS      Sets 1  --  -RS      Reps 1  10  -RS      Time 1  --  -RS      Additional Comments  GTB over noodle  -RS         Exercise 2    Exercise Name 2  low doorway stretch- gentle  -RS      Cueing 2  --  -RS      Reps 2  --  -RS      Time 2  --  -RS      Additional Comments  at home  -RS         Exercise 3    Exercise Name 3  serratus punch  -RS      Cueing 3  Verbal;Demo  -RS      Sets 3  --  -RS      Reps 3  15  -RS      Additional Comments  standing GTB  -RS         Exercise 4    Exercise Name 4  low trap setting  -RS      Cueing 4  Verbal;Demo  -RS      Sets 4  --  -RS      Reps 4  10  -RS      Additional Comments  --  -RS         Exercise 5    Exercise Name 5  shoulder ER AAROM cane- arm at side  -RS      Cueing 5  Verbal;Demo  -RS      Reps 5  10  -RS      Time 5  5s  -RS         Exercise 6    Exercise Name 6  inf trap lift  -RS      Cueing 6  Verbal;Demo  -RS      Sets 6  --  -RS      Reps 6  10  -RS      Additional Comments  tactile cues required  -RS         Exercise 7    Exercise Name 7  beach chair ER/IR sstretch  -RS      Cueing 7  Verbal;Demo  -RS      Reps 7  10  -RS      Time 7  5s  -RS         Exercise 9    Exercise Name 9  UBE   -RS      Cueing 9  Verbal  -RS      Time 9  4  -RS         Exercise 10    Exercise Name 10  Around the world  -RS      Cueing 10  Verbal;Demo  -RS      Sets 10  1  -RS      Reps 10  15  -RS      Additional Comments  L1 med ball  -RS         Exercise 11     Exercise Name 11  --  -RS      Cueing 11  --  -RS      Sets 11  --  -RS      Reps 11  --  -RS        User Key  (r) = Recorded By, (t) = Taken By, (c) = Cosigned By    Initials Name Provider Type    RS Paloma Samaniego, PT Physical Therapist                      Manual Rx (last 36 hours)      Manual Treatments     Row Name 07/06/21 1600             Total Minutes    60165 - PT Manual Therapy Minutes  12  -RS         Manual Rx 1    Manual Rx 1 Location  R shoulder- inf glide with flex, post GH glides  -RS         Manual Rx 2    Manual Rx 2 Location  STM R biceps  -RS         Manual Rx 3    Manual Rx 3 Location  R clav glides  -RS      Manual Rx 3 Type  sup/inf  -RS      Manual Rx 3 Grade  II-III  -RS         Manual Rx 4    Manual Rx 4 Location  RS at 90 eyes closed  -RS      Manual Rx 4 Grade  2x20  -RS        User Key  (r) = Recorded By, (t) = Taken By, (c) = Cosigned By    Initials Name Provider Type    RS Paloma Samaniego, PT Physical Therapist          PT OP Goals     Row Name 07/06/21 1700          PT Short Term Goals    STG Date to Achieve  07/05/21  -RS     STG 1  The pt will demonstrate full and painfree R shoulder AROM flex and Abduction without increased pain to facilitate improved functional reach.  -RS     STG 1 Progress  Ongoing  -RS     STG 1 Progress Comments  full PROM  -RS     STG 2  The pt will report at least 50% improvement in waking in pain with sleep to facilitate improved restorative sleep pattern.  -RS     STG 2 Progress  New  -RS        Long Term Goals    LTG Date to Achieve  08/13/21  -RS     LTG 1  The pt will report at least 75% improvement in waking in pain with sleep to facilitate improved restorative sleep pattern.  -RS     LTG 2  The pt will demonstrate IND and compliant with HEP focused on  return to PLOF and IND condition management.  -RS     LTG 2 Progress  New  -RS     LTG 3  The pt will demonstrate R shoulder FIR to at least L2 withou tincreased pain to facilitate improved  dressing and self care ADL performance.  -RS     LTG 3 Progress  New  -RS     LTG 4  The pt will return to recreational activity performance 3 days per week, including resistance training, without increased pain in R shoulder greater than 2/10.  -RS     LTG 4 Progress  New  -RS       User Key  (r) = Recorded By, (t) = Taken By, (c) = Cosigned By    Initials Name Provider Type    RS Paloma Samaniego PT Physical Therapist          Therapy Education  Education Details: body mechanics, tendinitis contributers, HEP, POC  Given: HEP, Symptoms/condition management  Program: Reinforced  How Provided: Verbal, Demonstration  Provided to: Patient  Level of Understanding: Verbalized, Demonstrated              Time Calculation:   Start Time: 1645  Stop Time: 1730  Time Calculation (min): 45 min  Timed Charges  56672 - PT Therapeutic Exercise Minutes: 32  50414 - PT Manual Therapy Minutes: 12  Total Minutes  Timed Charges Total Minutes: 44   Total Minutes: 44  Therapy Charges for Today     Code Description Service Date Service Provider Modifiers Qty    99879996682 HC PT THER PROC EA 15 MIN 7/6/2021 Paloma Samaniego, PT GP 2    14571827975 HC PT MANUAL THERAPY EA 15 MIN 7/6/2021 Paloma Samaniego, PT GP 1                    Paloma Samaniego PT  7/6/2021

## 2021-07-08 ENCOUNTER — HOSPITAL ENCOUNTER (OUTPATIENT)
Dept: PHYSICAL THERAPY | Facility: HOSPITAL | Age: 51
Setting detail: THERAPIES SERIES
Discharge: HOME | End: 2021-07-08

## 2021-07-08 DIAGNOSIS — M25.511 CHRONIC RIGHT SHOULDER PAIN: Primary | ICD-10-CM

## 2021-07-08 DIAGNOSIS — G89.29 CHRONIC RIGHT SHOULDER PAIN: Primary | ICD-10-CM

## 2021-07-08 DIAGNOSIS — M67.921 TENDINOPATHY OF RIGHT BICEPS TENDON: ICD-10-CM

## 2021-07-08 PROCEDURE — 97140 MANUAL THERAPY 1/> REGIONS: CPT

## 2021-07-08 PROCEDURE — 97110 THERAPEUTIC EXERCISES: CPT

## 2021-07-08 NOTE — THERAPY TREATMENT NOTE
Outpatient Physical Therapy Ortho Treatment Note  [facility]     Patient Name: Luis Quintana  : 1970  MRN: [id number]  Today's Date: 2021      Visit Date: 2021    Visit Dx:    ICD-10-CM ICD-9-CM   1. Chronic right shoulder pain  M25.511 719.41    G89.29 338.29   2. Tendinopathy of right biceps tendon  M67.921 727.9       Patient Active Problem List   Diagnosis   • Hypoglycemia   • Hepatitis C, chronic (G1b)   • Type 1 diabetes mellitus without complication (CMS/HCC)   • Chronic constipation   • History of hepatitis C virus infection   • Essential hypertension   • Mixed hyperlipidemia   • Allergies   • Vitamin D deficiency   • Depression with anxiety        Past Medical History:   Diagnosis Date   • Diabetes mellitus (CMS/HCC)    • Hemorrhoids    • Hyperlipidemia    • Hypertension    • Infectious viral hepatitis     Hep C        Past Surgical History:   Procedure Laterality Date   • COLONOSCOPY W/ POLYPECTOMY N/A 2017    EH, polyps, tics, IH   • ENDOSCOPY N/A 2017    LA Grade B reflux esophagitis, 2 cm HH, gastritis                       PT Assessment/Plan     Row Name 21 1800          PT Assessment    Assessment Comments  Pt reports no significant change in pain or function overall since last session, he reports he has noticed slight changes in pain since starting PT. Noted improved PROM without inc pain this date however he continues to have sig inc pain with FIR. Added ER plyometric with ball bounce at wall as well as bicep isometric with long duration, low load with good tolerance. He remains appropriate for skilled PT.  -RS        PT Plan    PT Plan Comments  Assess tolerance for updated HEP  -RS       User Key  (r) = Recorded By, (t) = Taken By, (c) = Cosigned By    Initials Name Provider Type    Paloma Mcrae, PT Physical Therapist            OP Exercises     Row Name 21 1600             Subjective Comments    Subjective Comments  Pt reports feeling no  difference, no pain currently, 5-6/10 in the past week at the highest  -RS         Subjective Pain    Able to rate subjective pain?  yes  -RS         Total Minutes    52584 - PT Therapeutic Exercise Minutes  25  -RS      27438 - PT Manual Therapy Minutes  15  -RS         Exercise 1    Exercise Name 1  PNF star  -RS      Cueing 1  Verbal;Demo  -RS      Reps 1  15  -RS      Time 1  supine noodle  -RS      Additional Comments  GTB  -RS         Exercise 2    Exercise Name 2  --  -RS         Exercise 3    Exercise Name 3  --  -RS      Cueing 3  --  -RS      Reps 3  --  -RS      Additional Comments  --  -RS         Exercise 4    Exercise Name 4  low trap setting  -RS      Cueing 4  Verbal;Demo  -RS      Reps 4  10  -RS      Additional Comments  plus lift off  -RS         Exercise 5    Exercise Name 5  shoulder ER AAROM cane- arm at side  -RS      Cueing 5  Verbal;Demo  -RS      Reps 5  10  -RS      Time 5  5s  -RS         Exercise 6    Exercise Name 6  ER plyometric at 90  -RS      Cueing 6  Verbal;Demo  -RS      Reps 6  2x20  -RS      Time 6  L1 medball  -RS         Exercise 7    Exercise Name 7  --  -RS      Cueing 7  --  -RS      Reps 7  --  -RS      Time 7  --  -RS         Exercise 9    Exercise Name 9  UBE   -RS      Cueing 9  Verbal  -RS      Time 9  4  -RS         Exercise 10    Exercise Name 10  Around the world  -RS      Cueing 10  Verbal;Demo  -RS      Sets 10  1  -RS      Reps 10  15  -RS      Additional Comments  L1 med ball  -RS        User Key  (r) = Recorded By, (t) = Taken By, (c) = Cosigned By    Initials Name Provider Type    RS Paloma Samaniego, PT Physical Therapist                      Manual Rx (last 36 hours)      Manual Treatments     Row Name 07/08/21 1600             Total Minutes    92269 - PT Manual Therapy Minutes  15  -RS         Manual Rx 1    Manual Rx 1 Location  R shoulder- inf glide with flex, post GH glides  -RS         Manual Rx 2    Manual Rx 2 Location  STM R biceps  -RS          Manual Rx 3    Manual Rx 3 Location  --  -RS      Manual Rx 3 Type  --  -RS      Manual Rx 3 Grade  --  -RS         Manual Rx 4    Manual Rx 4 Location  --  -RS      Manual Rx 4 Grade  --  -RS        User Key  (r) = Recorded By, (t) = Taken By, (c) = Cosigned By    Initials Name Provider Type    Paloma Mcrae PT Physical Therapist              Therapy Education  Education Details: updated HEP to same access code              Time Calculation:   Start Time: 1645  Stop Time: 1730  Time Calculation (min): 45 min  Timed Charges  44764 - PT Therapeutic Exercise Minutes: 25  27083 - PT Manual Therapy Minutes: 15  Total Minutes  Timed Charges Total Minutes: 40   Total Minutes: 40  Therapy Charges for Today     Code Description Service Date Service Provider Modifiers Qty    29928497499  PT THER PROC EA 15 MIN 7/8/2021 Paloma Samaniego, PT GP 2    45873519554 HC PT MANUAL THERAPY EA 15 MIN 7/8/2021 Paloma Samaniego, PT GP 1                    Paloma Samaniego PT  7/8/2021

## 2021-07-13 ENCOUNTER — HOSPITAL ENCOUNTER (OUTPATIENT)
Dept: PHYSICAL THERAPY | Facility: HOSPITAL | Age: 51
Setting detail: THERAPIES SERIES
Discharge: HOME | End: 2021-07-13

## 2021-07-13 DIAGNOSIS — M67.921 TENDINOPATHY OF RIGHT BICEPS TENDON: ICD-10-CM

## 2021-07-13 DIAGNOSIS — M25.511 CHRONIC RIGHT SHOULDER PAIN: Primary | ICD-10-CM

## 2021-07-13 DIAGNOSIS — G89.29 CHRONIC RIGHT SHOULDER PAIN: Primary | ICD-10-CM

## 2021-07-13 PROCEDURE — 97140 MANUAL THERAPY 1/> REGIONS: CPT

## 2021-07-13 PROCEDURE — 97110 THERAPEUTIC EXERCISES: CPT

## 2021-07-13 NOTE — THERAPY TREATMENT NOTE
Outpatient Physical Therapy Ortho Treatment Note  [facility]     Patient Name: Luis Quintana  : 1970  MRN: [id number]  Today's Date: 2021      Visit Date: 2021    Visit Dx:    ICD-10-CM ICD-9-CM   1. Chronic right shoulder pain  M25.511 719.41    G89.29 338.29   2. Tendinopathy of right biceps tendon  M67.921 727.9       Patient Active Problem List   Diagnosis   • Hypoglycemia   • Hepatitis C, chronic (G1b)   • Type 1 diabetes mellitus without complication (CMS/HCC)   • Chronic constipation   • History of hepatitis C virus infection   • Essential hypertension   • Mixed hyperlipidemia   • Allergies   • Vitamin D deficiency   • Depression with anxiety        Past Medical History:   Diagnosis Date   • Diabetes mellitus (CMS/HCC)    • Hemorrhoids    • Hyperlipidemia    • Hypertension    • Infectious viral hepatitis     Hep C        Past Surgical History:   Procedure Laterality Date   • COLONOSCOPY W/ POLYPECTOMY N/A 2017    EH, polyps, tics, IH   • ENDOSCOPY N/A 2017    LA Grade B reflux esophagitis, 2 cm HH, gastritis                       PT Assessment/Plan     Row Name 21 1600          PT Assessment    Assessment Comments  Pt reports 30% improvement in pain and function since starting PT. Focused this date on FIR including sleeper stretch, cane ext up back, FIR at doorway all with good tolerance. Added ER and IR with TB at side with marked ER weakness. Progressed HEP to include sleeper stretch and ER/IR with good understanding. Pt remains appropriate for PT.  -RS        PT Plan    PT Plan Comments  Progress note, if no significant objective progress refer to MD ORTIZ       User Key  (r) = Recorded By, (t) = Taken By, (c) = Cosigned By    Initials Name Provider Type    Paloma Mcrae, PT Physical Therapist            OP Exercises     Row Name 21 1600 21 1500          Subjective Comments    Subjective Comments  Pt feels 30 percent better.  -RS  --         Subjective Pain    Able to rate subjective pain?  yes  -RS  --     Subjective Pain Comment  none at rest, pain with FIR  -RS  --        Total Minutes    01519 - PT Therapeutic Exercise Minutes  23  -RS  --     03463 - PT Manual Therapy Minutes  --  17  -RS        Exercise 1    Exercise Name 1  sleeper stretch  -RS  --     Cueing 1  Verbal;Demo  -RS  --     Reps 1  5  -RS  --     Time 1  15s  -RS  --        Exercise 2    Exercise Name 2  ER TB  -RS  --     Cueing 2  Verbal;Demo  -RS  --     Sets 2  2  -RS  --     Reps 2  10  -RS  --     Time 2  20  -RS  --     Additional Comments  RTB towel for alignment  -RS  --        Exercise 3    Exercise Name 3  IR TB  -RS  --     Cueing 3  Verbal;Demo  -RS  --     Sets 3  2  -RS  --     Reps 3  10  -RS  --     Time 3  15s  -RS  --     Additional Comments  BTB towel  -RS  --        Exercise 4    Exercise Name 4  shoulder ext with cane- up back  -RS  --     Cueing 4  Verbal;Demo  -RS  --     Reps 4  10  -RS  --     Time 4  5s  -RS  --        Exercise 5    Exercise Name 5  around the world for FIR  -RS  --     Cueing 5  Verbal;Demo  -RS  --     Sets 5  2  -RS  --     Reps 5  20  -RS  --     Time 5  --  -RS  --     Additional Comments  L2 med ball  -RS  --        Exercise 6    Exercise Name 6  UBE  -RS  --     Cueing 6  Verbal  -RS  --     Reps 6  --  -RS  --     Time 6  4 min  -RS  --     Additional Comments  F/B L2  -RS  --        Exercise 9    Exercise Name 9  UBE   -RS  --     Cueing 9  Verbal  -RS  --     Time 9  4  -RS  --        Exercise 10    Exercise Name 10  Around the [place]  -RS  --     Cueing 10  Verbal;Demo  -RS  --     Sets 10  1  -RS  --     Reps 10  15  -RS  --       User Key  (r) = Recorded By, (t) = Taken By, (c) = Cosigned By    Initials Name Provider Type    RS Paloma Samaniego, PT Physical Therapist                      Manual Rx (last 36 hours)      Manual Treatments     Row Name 07/13/21 1500             Total Minutes    28999 - PT Manual Therapy Minutes  17   -RS         Manual Rx 1    Manual Rx 1 Location  R shoulder- inf glide with flex, post GH glides  -RS         Manual Rx 2    Manual Rx 2 Location  STM R biceps/sustained pressure  -RS         Manual Rx 3    Manual Rx 3 Location  FIR doorway  -RS      Manual Rx 3 Type  3x5, PT pressure at scap, cx flex, lat, rot  -RS         Manual Rx 4    Manual Rx 4 Location  IR with OP at  goint  -RS        User Key  (r) = Recorded By, (t) = Taken By, (c) = Cosigned By    Initials Name Provider Type    RS Paloma Samaniego PT Physical Therapist          PT OP Goals     Row Name 07/13/21 1600          PT Short Term Goals    STG Date to Achieve  07/05/21  -RS     STG 1  The pt will demonstrate full and painfree R shoulder AROM flex and Abduction without increased pain to facilitate improved functional reach.  -RS     STG 1 Progress  Ongoing  -RS     STG 2  The pt will report at least 50% improvement in waking in pain with sleep to facilitate improved restorative sleep pattern.  -RS     STG 2 Progress  New  -RS        Long Term Goals    LTG Date to Achieve  08/13/21  -RS     LTG 1  The pt will report at least 75% improvement in waking in pain with sleep to facilitate improved restorative sleep pattern.  -RS     LTG 2  The pt will demonstrate IND and compliant with HEP focused on  return to PLOF and IND condition management.  -RS     LTG 2 Progress  New  -RS     LTG 3  The pt will demonstrate R shoulder FIR to at least L2 withou tincreased pain to facilitate improved dressing and self care ADL performance.  -RS     LTG 3 Progress  New  -RS     LTG 4  The pt will return to recreational activity performance 3 days per week, including resistance training, without increased pain in R shoulder greater than 2/10.  -RS     LTG 4 Progress  New  -RS       User Key  (r) = Recorded By, (t) = Taken By, (c) = Cosigned By    Initials Name Provider Type    RS Paloma Samaniego PT Physical Therapist          Therapy Education  Education Details:  updated HEP to same access code, pt reports understanding, provided written copy              Time Calculation:   Start Time: 1600  Stop Time: 1644  Time Calculation (min): 44 min  Timed Charges  95350 - PT Therapeutic Exercise Minutes: 23  50584 - PT Manual Therapy Minutes: 17  Total Minutes  Timed Charges Total Minutes: 23   Total Minutes: 23  Therapy Charges for Today     Code Description Service Date Service Provider Modifiers Qty    26716927368 HC PT THER PROC EA 15 MIN 7/13/2021 Paloma Samaniego, PT GP 2    01730006202 HC PT MANUAL THERAPY EA 15 MIN 7/13/2021 Paloma Samaniego, PT GP 1                    Paloma Samaniego, PT  7/13/2021

## 2021-07-15 ENCOUNTER — HOSPITAL ENCOUNTER (OUTPATIENT)
Dept: PHYSICAL THERAPY | Facility: HOSPITAL | Age: 51
Setting detail: THERAPIES SERIES
Discharge: HOME | End: 2021-07-15

## 2021-07-15 DIAGNOSIS — M25.511 CHRONIC RIGHT SHOULDER PAIN: Primary | ICD-10-CM

## 2021-07-15 DIAGNOSIS — M67.921 TENDINOPATHY OF RIGHT BICEPS TENDON: ICD-10-CM

## 2021-07-15 DIAGNOSIS — G89.29 CHRONIC RIGHT SHOULDER PAIN: Primary | ICD-10-CM

## 2021-07-15 PROCEDURE — 97110 THERAPEUTIC EXERCISES: CPT

## 2021-07-15 PROCEDURE — 97140 MANUAL THERAPY 1/> REGIONS: CPT

## 2021-07-15 NOTE — THERAPY PROGRESS REPORT/RE-CERT
Outpatient Physical Therapy Ortho Progress Note  [facility]     Patient Name: Luis Quintana  : 1970  MRN: [id number]  Today's Date: 7/15/2021      Visit Date: 07/15/2021    Visit Dx:    ICD-10-CM ICD-9-CM   1. Chronic right shoulder pain  M25.511 719.41    G89.29 338.29   2. Tendinopathy of right biceps tendon  M67.921 727.9       Patient Active Problem List   Diagnosis   • Hypoglycemia   • Hepatitis C, chronic (G1b)   • Type 1 diabetes mellitus without complication (CMS/HCC)   • Chronic constipation   • History of hepatitis C virus infection   • Essential hypertension   • Mixed hyperlipidemia   • Allergies   • Vitamin D deficiency   • Depression with anxiety        Past Medical History:   Diagnosis Date   • Diabetes mellitus (CMS/HCC)    • Hemorrhoids    • Hyperlipidemia    • Hypertension    • Infectious viral hepatitis     Hep C        Past Surgical History:   Procedure Laterality Date   • COLONOSCOPY W/ POLYPECTOMY N/A 2017    EH, polyps, tics, IH   • ENDOSCOPY N/A 2017    LA Grade B reflux esophagitis, 2 cm HH, gastritis       PT Ortho     Row Name 07/15/21 1600       Right Upper Ext    Rt Shoulder Abduction AROM  0-160 2/10 pain  -RS    Rt Shoulder Flexion AROM  0-163 2/10 pain  -RS    Rt Shoulder External Rotation PROM  60  -RS    Rt Shoulder Internal Rotation AROM  FIR L4 with pain  -RS    Rt Shoulder Internal Rotation PROM  62  -RS      User Key  (r) = Recorded By, (t) = Taken By, (c) = Cosigned By    Initials Name Provider Type    RS Paloma Samaniego, PT Physical Therapist                      PT Assessment/Plan     Row Name 07/15/21 1600          PT Assessment    Functional Limitations  Performance in sport activities;Performance in self-care ADL;Performance in leisure activities;Limitations in functional capacity and performance;Limitation in home management  -RS     Impairments  Posture;Range of motion;Poor body mechanics;Pain;Muscle strength;Joint mobility  -RS     Assessment  Comments  Pt has been seen by skilled PT for R shoulder pain, he reports 30% improvement overall with 80% improvement in sleeping. He demonstrates no significat change in active ROM flex/scap however has decreased pain at end ranges, some improvement noted in FIR compared to start of PT. He has met 1/2 STG and 1/4 LTG. He continues to report limitation in recreational activity performance and functional reach, discussed potential to refer back to ortho MD as pt has improved over the past 4 weeks 30%, pt agreeable however will continue with PT over next 2-3 weeks as appropriate.  -RS     Please refer to paper survey for additional self-reported information  Yes  -RS        PT Plan    PT Plan Comments  Continue 2x a week for 2 weeks, consider referral back to ortho MD  -RS       User Key  (r) = Recorded By, (t) = Taken By, (c) = Cosigned By    Initials Name Provider Type    RS Paloma Samaniego, PT Physical Therapist            OP Exercises     Row Name 07/15/21 1500             Subjective Comments    Subjective Comments  Pt feels sleeping is better, 80% better, reaching is still painful behind his back  -RS         Subjective Pain    Able to rate subjective pain?  yes  -RS      Pre-Treatment Pain Level  0  -RS      Subjective Pain Comment  6/10 at highest  -RS         Total Minutes    37335 - PT Therapeutic Exercise Minutes  23  -RS      99922 - PT Manual Therapy Minutes  16  -RS         Exercise 1    Exercise Name 1  sleeper stretch  -RS      Cueing 1  Verbal;Demo  -RS      Reps 1  5  -RS      Time 1  15s  -RS         Exercise 2    Exercise Name 2  ER TB  -RS      Cueing 2  Verbal;Demo  -RS      Sets 2  2  -RS      Reps 2  10  -RS      Time 2  RTB  -RS         Exercise 3    Exercise Name 3  IR TB  -RS      Cueing 3  Verbal;Demo  -RS      Sets 3  2  -RS      Reps 3  10  -RS      Time 3  --  -RS      Additional Comments  RTB  -RS         Exercise 4    Exercise Name 4  CARS  -RS      Cueing 4  Verbal;Demo  -RS       Reps 4  10  -RS      Time 4  --  -RS         Exercise 5    Exercise Name 5  around the world for FIR  -RS      Cueing 5  Verbal;Demo  -RS      Sets 5  2  -RS      Reps 5  20  -RS      Additional Comments  L2 med ball  -RS         Exercise 6    Exercise Name 6  UBE  -RS      Cueing 6  Verbal  -RS      Time 6  4 min  -RS         Exercise 7    Exercise Name 7  CARS  -RS      Cueing 7  Verbal;Demo  -RS      Reps 7  10  -RS         Exercise 9    Exercise Name 9  --  -RS      Cueing 9  --  -RS      Time 9  --  -RS         Exercise 10    Exercise Name 10  Around the [place]  -RS      Cueing 10  Verbal;Demo  -RS      Sets 10  1  -RS      Reps 10  15  -RS        User Key  (r) = Recorded By, (t) = Taken By, (c) = Cosigned By    Initials Name Provider Type    RS Paloma Samaniego, PT Physical Therapist                      Manual Rx (last 36 hours)      Manual Treatments     Row Name 07/15/21 1500             Total Minutes    28602 - PT Manual Therapy Minutes  16  -RS         Manual Rx 1    Manual Rx 1 Location  R shoulder- inf glide with flex, post GH glides  -RS         Manual Rx 2    Manual Rx 2 Location  STM R biceps/sustained pressure  -RS         Manual Rx 3    Manual Rx 3 Location  FIR doorway  -RS      Manual Rx 3 Type  3x5, PT pressure at scap, cx flex, lat, rot  -RS         Manual Rx 4    Manual Rx 4 Location  IR with OP at GH goint  -RS        User Key  (r) = Recorded By, (t) = Taken By, (c) = Cosigned By    Initials Name Provider Type    RS Paloma Samaniego, PT Physical Therapist          PT OP Goals     Row Name 07/15/21 1500          PT Short Term Goals    STG Date to Achieve  07/05/21  -RS     STG 1  The pt will demonstrate full and painfree R shoulder AROM flex and Abduction without increased pain to facilitate improved functional reach.  -RS     STG 1 Progress  Progressing  -RS     STG 1 Progress Comments  to approx 160 with 2/10 pain  -RS     STG 2  The pt will report at least 50% improvement in waking in pain  with sleep to facilitate improved restorative sleep pattern.  -RS     STG 2 Progress  Met  -RS     STG 2 Progress Comments  80  -RS        Long Term Goals    LTG Date to Achieve  08/13/21  -RS     LTG 1  The pt will report at least 75% improvement in waking in pain with sleep to facilitate improved restorative sleep pattern.  -RS     LTG 1 Progress  Met  -RS     LTG 1 Progress Comments  80  -RS     LTG 2  The pt will demonstrate IND and compliant with HEP focused on  return to PLOF and IND condition management.  -RS     LTG 2 Progress  Ongoing;Progressing  -RS     LTG 3  The pt will demonstrate R shoulder FIR to at least L2 withou tincreased pain to facilitate improved dressing and self care ADL performance.  -RS     LTG 3 Progress  Progressing  -RS     LTG 3 Progress Comments  L4 with pain 3/10  -RS     LTG 4  The pt will return to recreational activity performance 3 days per week, including resistance training, without increased pain in R shoulder greater than 2/10.  -RS     LTG 4 Progress  Ongoing  -RS     LTG 4 Progress Comments  has not attempted due to pain  -RS       User Key  (r) = Recorded By, (t) = Taken By, (c) = Cosigned By    Initials Name Provider Type    Paloma Mcrae, PT Physical Therapist          Therapy Education  Education Details: progress toward goals, HEP, POC  Given: HEP, Symptoms/condition management  Program: Reinforced  How Provided: Verbal, Demonstration  Provided to: Patient  Level of Understanding: Verbalized, Demonstrated    Outcome Measure Options: Quick DASH  Quick DASH  Open a tight or new jar.: No Difficulty  Do heavy household chores (e.g., wash walls, wash floors): No Difficulty  Carry a shopping bag or briefcase: No Difficulty  Wash your back: Moderate Difficulty  Use a knife to cut food: No Difficulty  Recreational activities in which you take some force or impact through your arm, should or hand (e.g. golf, hammering, tennis, etc.): No Difficulty  During the past week,  to what extent has your arm, shoulder, or hand problem interfered with your normal social activites with family, friends, neighbors or groups?: Not at all  During the past week, were you limited in your work or other regular daily activities as a result of your arm, shoulder or hand problem?: Not limited at all  Arm, Shoulder, or hand pain: Moderate  Tingling (pins and needles) in your arm, shoulder, or hand: None  During the past week, how much difficulty have you had sleeping because of the pain in your arm, shoulder or hand?: No difficulty  Number of Questions Answered: 11  Quick DASH Score: 9.09         Time Calculation:   Start Time: 1605  Stop Time: 1645  Time Calculation (min): 40 min  Timed Charges  73424 - PT Therapeutic Exercise Minutes: 23  01308 - PT Manual Therapy Minutes: 16  Total Minutes  Timed Charges Total Minutes: 39   Total Minutes: 39  Therapy Charges for Today     Code Description Service Date Service Provider Modifiers Qty    67301507671 HC PT THER PROC EA 15 MIN 7/15/2021 Paloma Samaniego, PT GP 2    67165731215 HC PT MANUAL THERAPY EA 15 MIN 7/15/2021 Paloma Samaniego, PT GP 1          PT G-Codes  Outcome Measure Options: Quick DASH  Quick DASH Score: 9.09         Paloma Samaniego PT  7/15/2021

## 2021-07-19 ENCOUNTER — HOSPITAL ENCOUNTER (OUTPATIENT)
Dept: PHYSICAL THERAPY | Facility: HOSPITAL | Age: 51
Setting detail: THERAPIES SERIES
Discharge: HOME | End: 2021-07-19

## 2021-07-19 DIAGNOSIS — M67.921 TENDINOPATHY OF RIGHT BICEPS TENDON: ICD-10-CM

## 2021-07-19 DIAGNOSIS — M25.511 CHRONIC RIGHT SHOULDER PAIN: Primary | ICD-10-CM

## 2021-07-19 DIAGNOSIS — G89.29 CHRONIC RIGHT SHOULDER PAIN: Primary | ICD-10-CM

## 2021-07-19 PROCEDURE — 97140 MANUAL THERAPY 1/> REGIONS: CPT

## 2021-07-19 PROCEDURE — 97110 THERAPEUTIC EXERCISES: CPT

## 2021-07-19 NOTE — THERAPY TREATMENT NOTE
Outpatient Physical Therapy Ortho Treatment Note  [facility]     Patient Name: Luis Quintana  : 1970  MRN: [id number]  Today's Date: 2021      Visit Date: 2021    Visit Dx:    ICD-10-CM ICD-9-CM   1. Chronic right shoulder pain  M25.511 719.41    G89.29 338.29   2. Tendinopathy of right biceps tendon  M67.921 727.9       Patient Active Problem List   Diagnosis   • Hypoglycemia   • Hepatitis C, chronic (G1b)   • Type 1 diabetes mellitus without complication (CMS/HCC)   • Chronic constipation   • History of hepatitis C virus infection   • Essential hypertension   • Mixed hyperlipidemia   • Allergies   • Vitamin D deficiency   • Depression with anxiety        Past Medical History:   Diagnosis Date   • Diabetes mellitus (CMS/HCC)    • Hemorrhoids    • Hyperlipidemia    • Hypertension    • Infectious viral hepatitis     Hep C        Past Surgical History:   Procedure Laterality Date   • COLONOSCOPY W/ POLYPECTOMY N/A 2017    EH, polyps, tics, IH   • ENDOSCOPY N/A 2017    LA Grade B reflux esophagitis, 2 cm HH, gastritis                       PT Assessment/Plan     Row Name 21 1700          PT Assessment    Assessment Comments  Patient continues to demonstrate no significant change in active ROM flexion/abduction and presents with mild increased superior migration of GH joint. Therefore, inferior/lateral distraction with superior migration manual mobilization performed in attempt to improve GH joint rhythm with active OH movements. He also continues to display severe anterior glide of GH when performing functional IR based interventions. Active assist GH joint stability and posterior glide performed while performing IR around the world intervention leading to mild improvement in active motion and significant reduction in anterior shoulder pain leading to improve activity tolerance. Added IR/ER strengthening intervention with 45-60 degrees of ABD in attempt to behind more OH based  activities.   -LAI        PT Plan    PT Plan Comments  Review IR/ER at 45-60 degrees, add serratus and perturbations in supind with shoulder flexion/IR for shoulder stabilizaiton  -LAI       User Key  (r) = Recorded By, (t) = Taken By, (c) = Cosigned By    Initials Name Provider Type    Alejandra Brewster, PT Physical Therapist            OP Exercises     Row Name 07/19/21 1600 07/19/21 1500          Subjective Comments    Subjective Comments  Overall no change since last visit. Continues to have pain when reaching behind back. Will contact MD for follow up.  -LAI  --        Subjective Pain    Able to rate subjective pain?  yes  -LAI  --     Pre-Treatment Pain Level  0  -LAI  --        Total Minutes    74468 - PT Therapeutic Exercise Minutes  --  25  -LAI     96750 - PT Manual Therapy Minutes  --  15  -LAI        Exercise 1    Exercise Name 1  sleeper stretch  -LAI  --     Cueing 1  Verbal;Demo  -LAI  --     Reps 1  5  -LAI  --     Time 1  15s  -LAI  --        Exercise 2    Exercise Name 2  ER TB  -LAI  --     Cueing 2  Verbal;Demo  -LAI  --     Sets 2  2  -LAI  --     Reps 2  10  -LAI  --     Time 2  RTB  -LAI  --     Additional Comments  RTB  -LAI  --        Exercise 3    Exercise Name 3  IR TB  -LAI  --     Cueing 3  Verbal;Demo  -LAI  --     Sets 3  2  -LAI  --     Reps 3  10  -LAI  --     Additional Comments  RTB  -LAI  --        Exercise 4    Exercise Name 4  CARS  -LAI  --     Cueing 4  Verbal;Demo  -LAI  --     Reps 4  10  -LAI  --     Additional Comments  with towel  -LAI  --        Exercise 5    Exercise Name 5  around the world for FIR  -LAI  --     Cueing 5  Verbal;Demo  -LAI  --     Sets 5  2  -LAI  --     Reps 5  20  -LAI  --     Additional Comments  L2 med ball, shoulder setting assist  -LAI  --        Exercise 6    Exercise Name 6  UBE  -LAI  --     Cueing 6  Verbal  -LAI  --     Time 6  4 min  -LAI  --        Exercise 8    Exercise Name 8  wall arc ball taps  -LAI  --     Cueing 8  Verbal;Demo  -LAI  --     Sets 8  1  -LAI  --      Reps 8  10  -LAI  --     Additional Comments  L1 med ball  -LAI  --        Exercise 11    Exercise Name 11  IR/ER at 60 abd  -LAI  --     Cueing 11  Verbal;Demo  -LAI  --     Sets 11  2  -LAI  --     Reps 11  10  -LAI  --     Additional Comments  RTB  -LAI  --       User Key  (r) = Recorded By, (t) = Taken By, (c) = Cosigned By    Initials Name Provider Type    Alejandra Brewster, PT Physical Therapist                      Manual Rx (last 36 hours)      Manual Treatments     Row Name 07/19/21 1500             Total Minutes    84256 - PT Manual Therapy Minutes  15  -LAI         Manual Rx 1    Manual Rx 1 Location  R shoulder- inf glide with flex, post GH glides  -LAI         Manual Rx 2    Manual Rx 2 Location  STM R biceps/sustained pressure  -LAI         Manual Rx 3    Manual Rx 3 Location  FIR doorway  -      Manual Rx 3 Type  3x5, PT pressure at scap, cx flex, lat, rot  -LAI         Manual Rx 4    Manual Rx 4 Location  IR with OP at GH goint  -        User Key  (r) = Recorded By, (t) = Taken By, (c) = Cosigned By    Initials Name Provider Type    Alejandra Brewster, PT Physical Therapist          PT OP Goals     Row Name 07/19/21 1600          PT Short Term Goals    STG Date to Achieve  07/05/21  -     STG 1  The pt will demonstrate full and painfree R shoulder AROM flex and Abduction without increased pain to facilitate improved functional reach.  -     STG 1 Progress  Progressing  -     STG 2  The pt will report at least 50% improvement in waking in pain with sleep to facilitate improved restorative sleep pattern.  -     STG 2 Progress  Met  -        Long Term Goals    LTG Date to Achieve  08/13/21  -     LTG 1  The pt will report at least 75% improvement in waking in pain with sleep to facilitate improved restorative sleep pattern.  -     LTG 1 Progress  Met  -     LTG 2  The pt will demonstrate IND and compliant with HEP focused on  return to PLOF and IND condition management.  -      LTG 2 Progress  Ongoing;Progressing  -LAI     LTG 3  The pt will demonstrate R shoulder FIR to at least L2 withou tincreased pain to facilitate improved dressing and self care ADL performance.  -LAI     LTG 3 Progress  Progressing  -LAI     LTG 4  The pt will return to recreational activity performance 3 days per week, including resistance training, without increased pain in R shoulder greater than 2/10.  -LAI     LTG 4 Progress  Ongoing  -LAI       User Key  (r) = Recorded By, (t) = Taken By, (c) = Cosigned By    Initials Name Provider Type    Alejandra Brewster, PT Physical Therapist               Outcome Measure Options: Disabilities of the Arm, Shoulder, and Hand (DASH)         Time Calculation:   Start Time: 1615  Stop Time: 1655  Time Calculation (min): 40 min  Timed Charges  10587 - PT Therapeutic Exercise Minutes: 25  02480 - PT Manual Therapy Minutes: 15  Total Minutes  Timed Charges Total Minutes: 40   Total Minutes: 40  Therapy Charges for Today     Code Description Service Date Service Provider Modifiers Qty    55977359737  PT THER PROC EA 15 MIN 7/19/2021 Alejandra Romero, PT GP 2    43901203630 HC PT MANUAL THERAPY EA 15 MIN 7/19/2021 Alejandra Romero, PT GP 1          PT G-Codes  Outcome Measure Options: Disabilities of the Arm, Shoulder, and Hand (DASH)         Alejandra Romero PT  7/19/2021

## 2021-07-22 ENCOUNTER — HOSPITAL ENCOUNTER (OUTPATIENT)
Dept: PHYSICAL THERAPY | Facility: HOSPITAL | Age: 51
Setting detail: THERAPIES SERIES
Discharge: HOME | End: 2021-07-22

## 2021-07-22 DIAGNOSIS — M67.921 TENDINOPATHY OF RIGHT BICEPS TENDON: ICD-10-CM

## 2021-07-22 DIAGNOSIS — M25.511 CHRONIC RIGHT SHOULDER PAIN: Primary | ICD-10-CM

## 2021-07-22 DIAGNOSIS — G89.29 CHRONIC RIGHT SHOULDER PAIN: Primary | ICD-10-CM

## 2021-07-22 PROCEDURE — 97140 MANUAL THERAPY 1/> REGIONS: CPT

## 2021-07-22 PROCEDURE — 97110 THERAPEUTIC EXERCISES: CPT

## 2021-07-22 NOTE — THERAPY TREATMENT NOTE
Outpatient Physical Therapy Ortho Treatment Note  [facility]     Patient Name: Luis Quintana  : 1970  MRN: [id number]  Today's Date: 2021      Visit Date: 2021    Visit Dx:    ICD-10-CM ICD-9-CM   1. Chronic right shoulder pain  M25.511 719.41    G89.29 338.29   2. Tendinopathy of right biceps tendon  M67.921 727.9       Patient Active Problem List   Diagnosis   • Hypoglycemia   • Hepatitis C, chronic (G1b)   • Type 1 diabetes mellitus without complication (CMS/HCC)   • Chronic constipation   • History of hepatitis C virus infection   • Essential hypertension   • Mixed hyperlipidemia   • Allergies   • Vitamin D deficiency   • Depression with anxiety        Past Medical History:   Diagnosis Date   • Diabetes mellitus (CMS/HCC)    • Hemorrhoids    • Hyperlipidemia    • Hypertension    • Infectious viral hepatitis     Hep C        Past Surgical History:   Procedure Laterality Date   • COLONOSCOPY W/ POLYPECTOMY N/A 2017    EH, polyps, tics, IH   • ENDOSCOPY N/A 2017    LA Grade B reflux esophagitis, 2 cm HH, gastritis                       PT Assessment/Plan     Row Name 21 1600          PT Assessment    Assessment Comments  Luis reports improvement in pain over the past few sessions, he is better able to reach behind his back and away from his body but he continues to have pain above his typical baseline. He has made an appointment with ortho MD and would like to hold on PT until he sees her. PT had  previously discussed the potential for referral to MD to discuss further treatment options as pt was reporting no change in pain, however in past few sessions, pt has demonstrated improved PROM and AROM R shoulder and is reporting improvement in sleep performance and improved functional reach with decreased pain. Recommend he continues with PT to continue to address remaining limitations as he has been progressing toward remaining goals. This date, focused on improved internal  rotation active and passive mobility, noted improved tolerance for end range of motion abduction and IR at 60 degrees of abduction. He cotninues to require frequent cues to avoid compensatory movement patterns, however improves with visual feedback (mirror). He remains a good candidate for skilled PT.  -RS        PT Plan    PT Plan Comments  Progress serratus stabilization challenges, follow up regarding MD  -RS       User Key  (r) = Recorded By, (t) = Taken By, (c) = Cosigned By    Initials Name Provider Type    RS Paloma Samaniego, PT Physical Therapist            OP Exercises     Row Name 07/22/21 1600             Subjective Comments    Subjective Comments  Overall feeling better, able to reach better behind his back and away from his body. going to MD on Monday.  -RS         Subjective Pain    Able to rate subjective pain?  yes  -RS      Pre-Treatment Pain Level  0  -RS      Subjective Pain Comment  arrival time 1607  -RS         Total Minutes    15303 - PT Therapeutic Exercise Minutes  17  -RS      60071 - PT Manual Therapy Minutes  10  -RS         Exercise 1    Exercise Name 1  sleeper stretch  -RS      Cueing 1  Verbal;Demo  -RS      Reps 1  5  -RS      Time 1  15s  -RS         Exercise 2    Exercise Name 2  ER TB  -RS      Cueing 2  Verbal;Demo  -RS      Sets 2  2  -RS      Reps 2  10  -RS      Time 2  RTB  -RS         Exercise 3    Exercise Name 3  IR TB  -RS      Cueing 3  Verbal;Demo  -RS      Sets 3  2  -RS      Reps 3  10  -RS      Additional Comments  RTB  -RS         Exercise 4    Exercise Name 4  CARS  -RS      Cueing 4  Verbal;Demo  -RS      Sets 4  2  -RS      Reps 4  10  -RS         Exercise 5    Exercise Name 5  around the world for FIR  -RS      Cueing 5  Verbal;Demo  -RS      Sets 5  2  -RS      Reps 5  20  -RS      Additional Comments  L2 med ball, shoulder setting assist  -RS         Exercise 6    Exercise Name 6  UBE  -RS      Cueing 6  Verbal  -RS      Time 6  2 min  -RS         Exercise 8     Exercise Name 8  ball bounce up wall  -RS      Cueing 8  Verbal;Demo  -RS      Sets 8  2  -RS      Reps 8  20  -RS      Additional Comments  L2 ball  -RS         Exercise 11    Exercise Name 11  IR/ER at 60 abd  -RS      Cueing 11  Verbal;Demo  -RS      Sets 11  2  -RS      Reps 11  10  -RS      Additional Comments  RTB  -RS        User Key  (r) = Recorded By, (t) = Taken By, (c) = Cosigned By    Initials Name Provider Type    RS Paloma Samaniego PT Physical Therapist                      Manual Rx (last 36 hours)      Manual Treatments     Row Name 07/22/21 1600             Total Minutes    09259 - PT Manual Therapy Minutes  10  -RS         Manual Rx 1    Manual Rx 1 Location  R shoulder- inf glide with flex, post GH glides  -RS         Manual Rx 2    Manual Rx 2 Location  post glide with Hz AD/cross body  -RS         Manual Rx 4    Manual Rx 4 Location  IR with OP at GH goint  -RS        User Key  (r) = Recorded By, (t) = Taken By, (c) = Cosigned By    Initials Name Provider Type    RS Paloma Samaniego PT Physical Therapist              Therapy Education  Education Details: discussed pOC, HEP, return to MD              Time Calculation:   Start Time: 1607  Stop Time: 1640  Time Calculation (min): 33 min  Timed Charges  29476 - PT Therapeutic Exercise Minutes: 17  86009 - PT Manual Therapy Minutes: 10  Total Minutes  Timed Charges Total Minutes: 27   Total Minutes: 27  Therapy Charges for Today     Code Description Service Date Service Provider Modifiers Qty    15310555214 HC PT THER PROC EA 15 MIN 7/22/2021 Paloma Samaniego, PT GP 1    16593674861 HC PT MANUAL THERAPY EA 15 MIN 7/22/2021 Paloma Samaniego PT GP 1                    Paloma Samaniego PT  7/22/2021

## 2021-07-26 ENCOUNTER — OFFICE VISIT (OUTPATIENT)
Dept: ORTHOPEDIC SURGERY | Facility: CLINIC | Age: 51
End: 2021-07-26

## 2021-07-26 VITALS — WEIGHT: 215 LBS | HEIGHT: 74 IN | BODY MASS INDEX: 27.59 KG/M2 | TEMPERATURE: 96.8 F

## 2021-07-26 DIAGNOSIS — M25.511 CHRONIC RIGHT SHOULDER PAIN: Primary | ICD-10-CM

## 2021-07-26 DIAGNOSIS — G89.29 CHRONIC RIGHT SHOULDER PAIN: Primary | ICD-10-CM

## 2021-07-26 PROCEDURE — 99213 OFFICE O/P EST LOW 20 MIN: CPT | Performed by: NURSE PRACTITIONER

## 2021-07-26 NOTE — PROGRESS NOTES
"Chief Complaint: Follow-up right shoulder pain     HPI: She returns to clinic today for follow-up regarding right shoulder pain.  Localizes the majority of his pain to the anterior shoulder.  Reports his pain has persisted, but has improved somewhat with physical therapy.  He tells me the injection did not seem to help at all.  He would like to find out the true source of his pain.  He is considering surgery if that is an option.    Vitals:    07/26/21 1307   Temp: 96.8 °F (36 °C)   Weight: 97.5 kg (215 lb)   Height: 188 cm (74\")     Exam:    Skin is benign.  No gross abnormalities on inspection including any atrophy, swellings, or masses.  No palpable masses or adenopathy.  No focal areas of tenderness.  Full shoulder motion.  No evident instability or apprehension.  Positive O'Briens maneuver which reproduces the patient's typical pain.  Good strength with internal and external rotation.  Good strength in the rotator cuff, deltoid, biceps, triceps, and .  Intact sensation throughout the arm.  Brisk cap refill.  Palpable radial pulse.  Good skin turgor.     Imaging: None taken    Assessment: Chronic right shoulder pain, suspected glenoid labral tear    Plan: In light of his persistent pain, he would like to pursue further evaluation with a MRI.  I think this is reasonable.  I have entered a referral for him to have a MR arthrogram which is a better study to fully evaluate the labrum.  Patient understands he will have an injection of contrast in the shoulder prior to the study.  I will call him with results and come up with a plan at that time.  Meanwhile, I recommended he continue home exercises as well as conservative treatments for his pain.  He verbalized understanding of all we discussed and appreciated the assistance.    Conchis Ross, APRN     07/26/2021    "

## 2021-08-12 ENCOUNTER — HOSPITAL ENCOUNTER (OUTPATIENT)
Dept: GENERAL RADIOLOGY | Facility: HOSPITAL | Age: 51
Discharge: HOME | End: 2021-08-12

## 2021-08-12 ENCOUNTER — HOSPITAL ENCOUNTER (OUTPATIENT)
Dept: MRI IMAGING | Facility: HOSPITAL | Age: 51
Discharge: HOME | End: 2021-08-12

## 2021-08-12 DIAGNOSIS — G89.29 CHRONIC RIGHT SHOULDER PAIN: ICD-10-CM

## 2021-08-12 DIAGNOSIS — M25.511 CHRONIC RIGHT SHOULDER PAIN: ICD-10-CM

## 2021-08-12 PROCEDURE — 25010000002 IOPAMIDOL 61 % SOLUTION: Performed by: NURSE PRACTITIONER

## 2021-08-12 PROCEDURE — A9577 INJ MULTIHANCE: HCPCS | Performed by: NURSE PRACTITIONER

## 2021-08-12 PROCEDURE — 0 GADOBENATE DIMEGLUMINE 529 MG/ML SOLUTION: Performed by: NURSE PRACTITIONER

## 2021-08-12 PROCEDURE — 77002 NEEDLE LOCALIZATION BY XRAY: CPT

## 2021-08-12 PROCEDURE — 73222 MRI JOINT UPR EXTREM W/DYE: CPT

## 2021-08-12 PROCEDURE — 25010000003 LIDOCAINE 1 % SOLUTION: Performed by: NURSE PRACTITIONER

## 2021-08-12 RX ORDER — LIDOCAINE HYDROCHLORIDE 10 MG/ML
20 INJECTION, SOLUTION INFILTRATION; PERINEURAL ONCE
Status: COMPLETED | OUTPATIENT
Start: 2021-08-12 | End: 2021-08-12

## 2021-08-12 RX ADMIN — IOPAMIDOL 5 ML: 612 INJECTION, SOLUTION INTRAVENOUS at 09:18

## 2021-08-12 RX ADMIN — GADOBENATE DIMEGLUMINE 0.05 ML: 529 INJECTION, SOLUTION INTRAVENOUS at 09:18

## 2021-08-12 RX ADMIN — LIDOCAINE HYDROCHLORIDE 4 ML: 10 INJECTION, SOLUTION INFILTRATION; PERINEURAL at 09:18

## 2021-08-13 ENCOUNTER — TELEPHONE (OUTPATIENT)
Dept: ORTHOPEDIC SURGERY | Facility: CLINIC | Age: 51
End: 2021-08-13

## 2021-08-13 NOTE — TELEPHONE ENCOUNTER
I spoke to Leandro Mariano.  I provided him with the right shoulder MR arthrogram results.  The study does not reveal a tear of the rotator cuff or labral.  Patient reports that he is getting better.  I recommended he consider following up with Dr. Foster for evaluation and recommendations if his symptoms persist.  He would like to give this some thought and follow-up if needed.

## 2021-09-02 ENCOUNTER — DOCUMENTATION (OUTPATIENT)
Dept: PHYSICAL THERAPY | Facility: HOSPITAL | Age: 51
End: 2021-09-02

## 2021-09-02 DIAGNOSIS — M25.511 CHRONIC RIGHT SHOULDER PAIN: Primary | ICD-10-CM

## 2021-09-02 DIAGNOSIS — M67.921 TENDINOPATHY OF RIGHT BICEPS TENDON: ICD-10-CM

## 2021-09-02 DIAGNOSIS — G89.29 CHRONIC RIGHT SHOULDER PAIN: Primary | ICD-10-CM

## 2021-09-02 NOTE — THERAPY DISCHARGE NOTE
Outpatient Physical Therapy Discharge Summary         Patient Name: Luis Quintana  : 1970  MRN: [id number]    Today's Date: 2021    Visit Dx:    ICD-10-CM ICD-9-CM   1. Chronic right shoulder pain  M25.511 719.41    G89.29 338.29   2. Tendinopathy of right biceps tendon  M67.921 727.9       PT OP Goals     Row Name 21 1100          PT Short Term Goals    STG Date to Achieve  21  -RS     STG 1  The pt will demonstrate full and painfree R shoulder AROM flex and Abduction without increased pain to facilitate improved functional reach.  -RS     STG 1 Progress  Partially Met  -RS     STG 2  The pt will report at least 50% improvement in waking in pain with sleep to facilitate improved restorative sleep pattern.  -RS     STG 2 Progress  Met  -RS        Long Term Goals    LTG Date to Achieve  21  -RS     LTG 1  The pt will report at least 75% improvement in waking in pain with sleep to facilitate improved restorative sleep pattern.  -RS     LTG 1 Progress  Met  -RS     LTG 2  The pt will demonstrate IND and compliant with HEP focused on  return to PLOF and IND condition management.  -RS     LTG 2 Progress  Partially Met  -RS     LTG 3  The pt will demonstrate R shoulder FIR to at least L2 withou tincreased pain to facilitate improved dressing and self care ADL performance.  -RS     LTG 3 Progress  Partially Met  -RS     LTG 4  The pt will return to recreational activity performance 3 days per week, including resistance training, without increased pain in R shoulder greater than 2/10.  -RS     LTG 4 Progress  Not Met  -RS       User Key  (r) = Recorded By, (t) = Taken By, (c) = Cosigned By    Initials Name Provider Type    Paloma Mcrae PT Physical Therapist          OP PT Discharge Summary  Date of Discharge: 21  Reason for Discharge: Patient/Caregiver request  Outcomes Achieved: Refer to plan of care for updates on goals achieved  Discharge Destination: Home with home  program  Discharge Instructions/Additional Comments: Pt was seen for R shoulder pain by PT. At time of last session, he had met all STG and was progressing toward LTG however he was returning to MD to discuss potential for imaging due to continued pain. Pt demonstrated IND with HEP at last session and is appropriate for DC from PT.      Time Calculation:                    Paloma Samaniego, PT  9/2/2021

## 2021-10-05 ENCOUNTER — IMMUNIZATION (OUTPATIENT)
Dept: VACCINE CLINIC | Facility: HOSPITAL | Age: 51
End: 2021-10-05

## 2021-10-05 PROCEDURE — 91300 HC SARSCOV02 VAC 30MCG/0.3ML IM: CPT | Performed by: INTERNAL MEDICINE

## 2021-10-05 PROCEDURE — 0003A: CPT | Performed by: INTERNAL MEDICINE

## 2021-10-30 DIAGNOSIS — E10.9 TYPE 1 DIABETES MELLITUS WITHOUT COMPLICATION (HCC): ICD-10-CM

## 2021-11-01 RX ORDER — INSULIN LISPRO 100 [IU]/ML
35 INJECTION, SOLUTION INTRAVENOUS; SUBCUTANEOUS
Qty: 90 ML | Refills: 1 | Status: SHIPPED | OUTPATIENT
Start: 2021-11-01 | End: 2022-04-07 | Stop reason: SDUPTHER

## 2021-11-07 DIAGNOSIS — T78.40XD ALLERGY, SUBSEQUENT ENCOUNTER: ICD-10-CM

## 2021-11-07 DIAGNOSIS — E78.2 MIXED HYPERLIPIDEMIA: Chronic | ICD-10-CM

## 2021-11-07 DIAGNOSIS — I10 ESSENTIAL HYPERTENSION: Chronic | ICD-10-CM

## 2021-11-07 RX ORDER — ROSUVASTATIN CALCIUM 5 MG/1
5 TABLET, COATED ORAL DAILY
Qty: 90 TABLET | Refills: 1 | Status: CANCELLED | OUTPATIENT
Start: 2021-11-07

## 2021-11-07 RX ORDER — LISINOPRIL 40 MG/1
40 TABLET ORAL DAILY
Qty: 90 TABLET | Refills: 1 | Status: CANCELLED | OUTPATIENT
Start: 2021-11-07

## 2021-11-07 RX ORDER — MONTELUKAST SODIUM 10 MG/1
10 TABLET ORAL NIGHTLY
Qty: 90 TABLET | Refills: 1 | Status: CANCELLED | OUTPATIENT
Start: 2021-11-07

## 2021-11-09 RX ORDER — ROSUVASTATIN CALCIUM 5 MG/1
5 TABLET, COATED ORAL DAILY
Qty: 90 TABLET | Refills: 1 | Status: SHIPPED | OUTPATIENT
Start: 2021-11-09 | End: 2022-05-02 | Stop reason: SDUPTHER

## 2021-11-09 RX ORDER — MONTELUKAST SODIUM 10 MG/1
10 TABLET ORAL NIGHTLY
Qty: 90 TABLET | Refills: 1 | Status: SHIPPED | OUTPATIENT
Start: 2021-11-09 | End: 2022-05-02 | Stop reason: SDUPTHER

## 2021-11-09 RX ORDER — LISINOPRIL 40 MG/1
40 TABLET ORAL DAILY
Qty: 90 TABLET | Refills: 1 | Status: SHIPPED | OUTPATIENT
Start: 2021-11-09 | End: 2022-05-02 | Stop reason: SDUPTHER

## 2021-11-16 ENCOUNTER — OFFICE VISIT (OUTPATIENT)
Dept: INTERNAL MEDICINE | Facility: CLINIC | Age: 51
End: 2021-11-16

## 2021-11-16 VITALS
HEIGHT: 74 IN | HEART RATE: 92 BPM | DIASTOLIC BLOOD PRESSURE: 62 MMHG | WEIGHT: 211 LBS | SYSTOLIC BLOOD PRESSURE: 137 MMHG | OXYGEN SATURATION: 96 % | TEMPERATURE: 96.8 F | BODY MASS INDEX: 27.08 KG/M2

## 2021-11-16 DIAGNOSIS — E10.9 TYPE 1 DIABETES MELLITUS WITHOUT COMPLICATION (HCC): Chronic | ICD-10-CM

## 2021-11-16 DIAGNOSIS — E78.2 MIXED HYPERLIPIDEMIA: Chronic | ICD-10-CM

## 2021-11-16 DIAGNOSIS — E55.9 VITAMIN D DEFICIENCY: Chronic | ICD-10-CM

## 2021-11-16 DIAGNOSIS — Z11.3 SCREEN FOR STD (SEXUALLY TRANSMITTED DISEASE): ICD-10-CM

## 2021-11-16 DIAGNOSIS — T78.40XD ALLERGY, SUBSEQUENT ENCOUNTER: Chronic | ICD-10-CM

## 2021-11-16 DIAGNOSIS — Z86.19 HISTORY OF HEPATITIS C VIRUS INFECTION: ICD-10-CM

## 2021-11-16 DIAGNOSIS — F41.8 DEPRESSION WITH ANXIETY: Chronic | ICD-10-CM

## 2021-11-16 DIAGNOSIS — R53.83 OTHER FATIGUE: ICD-10-CM

## 2021-11-16 DIAGNOSIS — Z00.00 ANNUAL PHYSICAL EXAM: ICD-10-CM

## 2021-11-16 DIAGNOSIS — I10 ESSENTIAL HYPERTENSION: Primary | Chronic | ICD-10-CM

## 2021-11-16 PROBLEM — H02.883 MEIBOMIAN GLAND DYSFUNCTION OF RIGHT EYE: Status: ACTIVE | Noted: 2021-09-02

## 2021-11-16 PROBLEM — H16.229 KERATOCONJUNCTIVITIS SICCA NOT DUE TO SJOGREN'S SYNDROME: Status: ACTIVE | Noted: 2021-09-02

## 2021-11-16 PROCEDURE — 99396 PREV VISIT EST AGE 40-64: CPT | Performed by: NURSE PRACTITIONER

## 2021-11-16 NOTE — PROGRESS NOTES
"        Chief Complaint  Annual Exam (physical)     Subjective:      History of Present Illness {CC  Problem List  Visit  Diagnosis   Encounters  Notes  Medications  Labs  Result Review Imaging  Media :23}     Luis Quintana presents to [facility] for annual exam.     He chronically has hypertension, diabetes (insulin dependent), hyperlipidemia, allergies (recurrent sinus infections with prior sinus surgery), anxiety/depression (chronically on lorazepam by psych).     He was seen by ortho for shoulder pain.  Improved.   PT improved.     Diabetes: states BGs have been stable.  No hypoglycemic events.  He had tried to get in to see Dr Krause but was not taking new patients. Requests Dr Caceres. He states he will need a different PA to get Dexcom supplies from prior supply company because they will give him 3 months at a time as compared to monthly at [facility].     Hypertension has been controlled.     States when his mood changes he will drink and smoke at times.  States he will ask psych about getting therapist referral.     Luis is here for coordination of medical care, to discuss health maintenance, disease prevention as well as to follow up on medical problems.       Patient Care Team:  Delia Gresham III, NP-C as PCP - General (Internal Medicine)  Juaquin Riggs MD (Psychiatry)  Lupillo Patino MD as Consulting Physician (Gastroenterology)  Afshin Mike MD as Consulting Physician (Otolaryngology)        He states that his activity level is moderate.   His diet is in general, a \"healthy\" diet  .   Feels well with few complaints.     Health and Weight:   Weight trend is   Wt Readings from Last 4 Encounters:   11/16/21 95.7 kg (211 lb)   10/20/21 95.3 kg (210 lb)   07/26/21 97.5 kg (215 lb)   05/24/21 99.8 kg (220 lb)           Mental Health Check:   Depression screen: PHQ-2 Total Score:      Blood Pressure:   BP Readings from Last 3 Encounters: "   11/16/21 137/62   10/20/21 123/83   05/12/21 130/80        Cholesterol Screen:   Most men start screen at 35, if you have family history or comorbidities it may be screen earlier.     Risk Evaluation:  1. Cardiovascular risk factors: hypertension, hyperlipidemia, diabetes mellitus, male gender.  2. Diabetes risk factors: patient has diabetes and being treated.   3. Cancer risk factors: no risk factors for cancer.    Prevention:   Cholesterol test will check. Cholesterol is will be checked..  Blood sugar test will check. Fasting blood sugar Patient has diabetes and is being treated. will be checked..  Hepatitis  C screen: [] Due  [x] Known hx HCV (treated)      Colon Cancer Screen:   He has no change in bowel habits.   Patient's last colonoscopy was 2017.   He is advised to repeat in 5 years.  Family history: [x] None    [] Yes:     Genital/ Urinary Health:    · He voids without difficulty.  · He is not sexually active.  (for some time)     STI Screen:   [x] HIV     [x] Syphilis   [x] Chlamydia/ Gonorrhea   [] Declines STD screen  If tests ordered, patient was informed HIV results will not show up on my chart.     Testicular cancer Screen:   Testicular self exams recommended once a month.   Advised that any firm testicular nodules to be reported immediately.    Prostate cancer screening:  Family history: [x] None    [] Yes:     Lung Cancer Screen:   [] Nonsmoker  [x] History of smoking  []     Vaccines Due:   [] Pneumovax    []  [] Shingrix (shingles: series of 2)   []  [] COVID (series of 2)    []      Last eye exam: Due - states he has scheduled.   Always where sunglass when outside with UV protection.     Skin Cancer:   Regular Sunsceen: Advised  Routine self assessment of your skin, report any changes.     I have reviewed patient's medical history, any new submitted information provided by patient or medical assistant and updated medical record.      Objective:      Physical Exam  Vitals reviewed.    Constitutional:       Appearance: He is well-developed.   HENT:      Head: Normocephalic and atraumatic.      Right Ear: External ear normal.      Left Ear: External ear normal.      Nose: Nose normal.   Eyes:      Conjunctiva/sclera: Conjunctivae normal.      Pupils: Pupils are equal, round, and reactive to light.   Neck:      Thyroid: No thyromegaly.      Vascular: No JVD.   Cardiovascular:      Rate and Rhythm: Normal rate and regular rhythm.      Pulses:           Radial pulses are 2+ on the right side and 2+ on the left side.        Posterior tibial pulses are 1+ on the right side.      Heart sounds: Normal heart sounds, S1 normal and S2 normal. No murmur heard.  No friction rub. No gallop.    Pulmonary:      Effort: Pulmonary effort is normal.      Breath sounds: Normal breath sounds.   Chest:      Chest wall: No deformity.   Abdominal:      General: Bowel sounds are normal.      Palpations: Abdomen is soft.      Tenderness: There is no abdominal tenderness. Negative signs include Leyva's sign.      Hernia: No hernia is present. There is no hernia in the left inguinal area or right inguinal area.   Genitourinary:     Penis: Normal and circumcised.       Testes: Normal. Cremasteric reflex is present.   Musculoskeletal:      Cervical back: Normal range of motion and neck supple.   Feet:      Right foot:      Protective Sensation: 4 sites tested. 4 sites sensed.      Skin integrity: Skin integrity normal.      Toenail Condition: Right toenails are normal.      Left foot:      Protective Sensation: 4 sites tested. 4 sites sensed.      Skin integrity: Skin integrity normal.      Toenail Condition: Left toenails are normal.      Comments: Diabetic Foot Exam Performed and Monofilament Test Performed  Lymphadenopathy:      Cervical: No cervical adenopathy.   Skin:     General: Skin is warm and dry.      Capillary Refill: Capillary refill takes 2 to 3 seconds.      Nails: There is no clubbing.   Neurological:       "Mental Status: He is alert and oriented to person, place, and time.      Cranial Nerves: No cranial nerve deficit.      Sensory: No sensory deficit.      Motor: Motor function is intact.   Psychiatric:         Mood and Affect: Mood normal.         Speech: Speech normal.         Behavior: Behavior normal. Behavior is cooperative.         Thought Content: Thought content normal.         Judgment: Judgment normal.        Result Review  Data Reviewed:{ Labs  Result Review  Imaging  Med Tab  Media :23}                Vital Signs:   /62 (BP Location: Left arm, Patient Position: Sitting, Cuff Size: Adult)   Pulse 92   Temp 96.8 °F (36 °C) (Temporal)   Ht 188 cm (74.02\")   Wt 95.7 kg (211 lb)   SpO2 96%   BMI 27.08 kg/m²         Requested Prescriptions      No prescriptions requested or ordered in this encounter       Routine medications provided by this office will also be refilled via pharmacy request.       Current Outpatient Medications:   •  Continuous Blood Gluc Sensor (Dexcom G6 Sensor), Every 10 (Ten) Days., Disp: 9 each, Rfl: 1  •  Continuous Blood Gluc Transmit (Dexcom G6 Transmitter) misc, 1 each Every 3 (Three) Months., Disp: 2 each, Rfl: 1  •  fexofenadine (ALLEGRA) 60 MG tablet, Take 60 mg by mouth Daily., Disp: , Rfl:   •  FLUoxetine (PROzac) 20 MG capsule, TAKE 1 CAPSULE BY MOUTH EVERY MORNING, Disp: 30 capsule, Rfl: 1  •  fluticasone (FLONASE) 50 MCG/ACT nasal spray, 2 sprays into the nostril(s) as directed by provider Daily., Disp: , Rfl:   •  Insulin Glargine (BASAGLAR KWIKPEN) 100 UNIT/ML injection pen, Inject 38 Units under the skin into the appropriate area as directed Daily, Disp: 75 mL, Rfl: 1  •  Insulin Lispro, 1 Unit Dial, (HumaLOG KwikPen) 100 UNIT/ML solution pen-injector, Inject 35 Units under the skin into the appropriate area as directed 3 (Three) Times a Day With Meals (33-35 units per dose as directed)., Disp: 90 mL, Rfl: 1  •  Insulin Pen Needle (B-D ULTRAFINE III SHORT " PEN) 31G X 8 MM misc, 1 each 4 (Four) Times a Day., Disp: 400 each, Rfl: 1  •  lisinopril (PRINIVIL,ZESTRIL) 40 MG tablet, Take 1 tablet by mouth Daily., Disp: 90 tablet, Rfl: 1  •  LORazepam (ATIVAN) 1 MG tablet, take 1 tablet by mouth 3 times a day as needed anxiety, Disp: 90 tablet, Rfl: 1  •  LORazepam (ATIVAN) 1 MG tablet, Take 1 tablet by mouth 3 (Three) Times a Day As Needed for anxiety., Disp: 90 tablet, Rfl: 1  •  LORazepam (ATIVAN) 1 MG tablet, take 1 tablet by mouth 3 times a day as needed anxiety, Disp: 90 tablet, Rfl: 1  •  LORazepam (ATIVAN) 1 MG tablet, 1 tablet by mouth 3 times a day as needed anxiety, Disp: 90 tablet, Rfl: 0  •  montelukast (SINGULAIR) 10 MG tablet, Take 1 tablet by mouth Every Night., Disp: 90 tablet, Rfl: 1  •  Multiple Vitamins-Minerals (MULTIVITAMIN WITH MINERALS) tablet tablet, Take 1 tablet by mouth Daily., Disp: , Rfl:   •  rosuvastatin (Crestor) 5 MG tablet, Take 1 tablet by mouth Daily., Disp: 90 tablet, Rfl: 1  •  traZODone (DESYREL) 100 MG tablet, Take 1.5 tablets by mouth Every Night., Disp: 45 tablet, Rfl: 1     Assessment and Plan:      Assessment and Plan {CC Problem List  Visit Diagnosis  ROS  Review (Popup)  Health Maintenance  Quality  BestPractice  Medications  SmartSets  SnapShot Encounters  Media :23}     Problem List Items Addressed This Visit        Allergies and Adverse Reactions    Allergies (Chronic)    Overview     Chronic  2 sinus surgeries in the past - recurrent sinus infections              Cardiac and Vasculature    Essential hypertension - Primary (Chronic)    Current Assessment & Plan     Hypertension is improving with treatment.  Continue current treatment regimen.  Dietary sodium restriction.  Regular aerobic exercise.  Stop smoking.  Continue current medications.  Blood pressure will be reassessed at the next regular appointment.         Relevant Orders    CBC (No Diff)    Mixed hyperlipidemia (Chronic)    Overview     Hx smoking          Current Assessment & Plan     Lipid abnormalities are improving with treatment.  Pharmacotherapy as ordered.  Lipids will be reassessed in 6 months.         Relevant Orders    Lipid Panel With LDL / HDL Ratio       Endocrine and Metabolic    Type 1 diabetes mellitus without complication (HCC) (Chronic)    Overview     Dx:   He has a Dexcom G6 to monitor glucose since 2019  3 sensors per month and 1 transmitter every 3 months - gets 3 months at a time.   Renal protection: lisinopril          Current Assessment & Plan     Diabetes is improving with treatment.   Continue current treatment regimen.  Discussed foot care.  Reminded to get yearly retinal exam.  Diabetes will be reassessed in 6 months.         Relevant Orders    Hemoglobin A1c    Microalbumin / Creatinine Urine Ratio - Urine, Clean Catch    Ambulatory Referral to Endocrinology (Completed)    Vitamin D deficiency (Chronic)    Relevant Orders    Vitamin D 25 hydroxy       Infectious Diseases    History of hepatitis C virus infection    Overview     S/P treatment (September 2017)            Mental Health    Depression with anxiety (Chronic)    Overview     Dr Riggs   Also takes trazadone for insomnia            Other Visit Diagnoses     Annual physical exam        Relevant Orders    Comprehensive Metabolic Panel    Lipid Panel With LDL / HDL Ratio    CBC (No Diff)    Other fatigue        Relevant Orders    TSH    T4, free    Screen for STD (sexually transmitted disease)        Relevant Orders    HIV-1 / O / 2 Ag / Antibody 4th Generation    RPR    Chlamydia trachomatis, Neisseria gonorrhoeae, PCR - Urine, Urine, Clean Catch          Follow Up {Instructions Charge Capture  Follow-up Communications :23}     Return in about 6 months (around 5/16/2022).    Patient was given instructions and counseling regarding his condition or for health maintenance advice. Please see specific information pulled into the AVS if appropriate.    Carmen disclaimer:   Much of  this encounter note is an electronic transcription/translation of spoken language to printed text. The electronic translation of spoken language may permit erroneous, or at times, nonsensical words or phrases to be inadvertently transcribed; Although I have reviewed the note for such errors, some may still exist.     Additional Patient Counseling:       There are no Patient Instructions on file for this visit.

## 2021-11-17 LAB
25(OH)D3+25(OH)D2 SERPL-MCNC: 48.5 NG/ML (ref 30–100)
ALBUMIN SERPL-MCNC: 4.6 G/DL (ref 4–5)
ALBUMIN/CREAT UR: 6 MG/G CREAT (ref 0–29)
ALBUMIN/GLOB SERPL: 1.8 {RATIO} (ref 1.2–2.2)
ALP SERPL-CCNC: 92 IU/L (ref 44–121)
ALT SERPL-CCNC: 26 IU/L (ref 0–44)
AST SERPL-CCNC: 27 IU/L (ref 0–40)
BILIRUB SERPL-MCNC: 0.4 MG/DL (ref 0–1.2)
BUN SERPL-MCNC: 15 MG/DL (ref 6–24)
BUN/CREAT SERPL: 18 (ref 9–20)
C TRACH RRNA SPEC QL NAA+PROBE: NEGATIVE
CALCIUM SERPL-MCNC: 9.7 MG/DL (ref 8.7–10.2)
CHLORIDE SERPL-SCNC: 101 MMOL/L (ref 96–106)
CHOLEST SERPL-MCNC: 146 MG/DL (ref 100–199)
CO2 SERPL-SCNC: 22 MMOL/L (ref 20–29)
CREAT SERPL-MCNC: 0.83 MG/DL (ref 0.76–1.27)
CREAT UR-MCNC: 160.3 MG/DL
ERYTHROCYTE [DISTWIDTH] IN BLOOD BY AUTOMATED COUNT: 11.6 % (ref 11.6–15.4)
GLOBULIN SER CALC-MCNC: 2.5 G/DL (ref 1.5–4.5)
GLUCOSE SERPL-MCNC: 180 MG/DL (ref 65–99)
HBA1C MFR BLD: 6.7 % (ref 4.8–5.6)
HCT VFR BLD AUTO: 40.8 % (ref 37.5–51)
HDLC SERPL-MCNC: 64 MG/DL
HGB BLD-MCNC: 14 G/DL (ref 13–17.7)
HIV 1+2 AB+HIV1 P24 AG SERPL QL IA: NON REACTIVE
LDLC SERPL CALC-MCNC: 65 MG/DL (ref 0–99)
LDLC/HDLC SERPL: 1 RATIO (ref 0–3.6)
MCH RBC QN AUTO: 32.2 PG (ref 26.6–33)
MCHC RBC AUTO-ENTMCNC: 34.3 G/DL (ref 31.5–35.7)
MCV RBC AUTO: 94 FL (ref 79–97)
MICROALBUMIN UR-MCNC: 9 UG/ML
N GONORRHOEA RRNA SPEC QL NAA+PROBE: NEGATIVE
PLATELET # BLD AUTO: 290 X10E3/UL (ref 150–450)
POTASSIUM SERPL-SCNC: 5.3 MMOL/L (ref 3.5–5.2)
PROT SERPL-MCNC: 7.1 G/DL (ref 6–8.5)
RBC # BLD AUTO: 4.35 X10E6/UL (ref 4.14–5.8)
RPR SER QL: NON REACTIVE
SODIUM SERPL-SCNC: 136 MMOL/L (ref 134–144)
T4 FREE SERPL-MCNC: 1.03 NG/DL (ref 0.82–1.77)
TRIGL SERPL-MCNC: 92 MG/DL (ref 0–149)
TSH SERPL DL<=0.005 MIU/L-ACNC: 0.91 UIU/ML (ref 0.45–4.5)
VLDLC SERPL CALC-MCNC: 17 MG/DL (ref 5–40)
WBC # BLD AUTO: 8.1 X10E3/UL (ref 3.4–10.8)

## 2021-11-24 ENCOUNTER — TELEPHONE (OUTPATIENT)
Dept: INTERNAL MEDICINE | Facility: CLINIC | Age: 51
End: 2021-11-24

## 2021-11-24 NOTE — TELEPHONE ENCOUNTER
I called back and left a msg that there are no notes from those dates and she may contact [facility] for whatever records they are needing

## 2021-11-24 NOTE — TELEPHONE ENCOUNTER
LION FROM [facility] SENT OVER A COUPLE OF REQUESTS FOR CHART NOTES ON PATIENT'S DIABETIC SUPPLIES    THEY ARE NEEDING FROM       2- 7-31-21     LION / [facility]  [phone]     SHE IS REFAXING REQUEST TO OFFICE

## 2022-03-28 ENCOUNTER — TELEPHONE (OUTPATIENT)
Dept: INTERNAL MEDICINE | Facility: CLINIC | Age: 52
End: 2022-03-28

## 2022-03-28 DIAGNOSIS — E10.9 TYPE 1 DIABETES MELLITUS WITHOUT COMPLICATION: ICD-10-CM

## 2022-03-28 RX ORDER — PEN NEEDLE, DIABETIC 31 GX5/16"
1 NEEDLE, DISPOSABLE MISCELLANEOUS 4 TIMES DAILY
Qty: 400 EACH | Refills: 1 | Status: SHIPPED | OUTPATIENT
Start: 2022-03-28

## 2022-04-04 DIAGNOSIS — E10.9 TYPE 1 DIABETES MELLITUS WITHOUT COMPLICATION: ICD-10-CM

## 2022-04-04 RX ORDER — INSULIN GLARGINE 100 [IU]/ML
38 INJECTION, SOLUTION SUBCUTANEOUS DAILY
Qty: 75 ML | Refills: 1 | Status: CANCELLED | OUTPATIENT
Start: 2022-04-04

## 2022-04-04 RX ORDER — INSULIN GLARGINE 100 [IU]/ML
38 INJECTION, SOLUTION SUBCUTANEOUS DAILY
Qty: 75 ML | Refills: 3 | Status: SHIPPED | OUTPATIENT
Start: 2022-04-04 | End: 2022-04-11

## 2022-04-07 DIAGNOSIS — E10.9 TYPE 1 DIABETES MELLITUS WITHOUT COMPLICATION: ICD-10-CM

## 2022-04-07 RX ORDER — INSULIN LISPRO 100 [IU]/ML
INJECTION, SOLUTION INTRAVENOUS; SUBCUTANEOUS
Qty: 90 ML | Refills: 1 | Status: SHIPPED | OUTPATIENT
Start: 2022-04-07 | End: 2023-03-16 | Stop reason: SDUPTHER

## 2022-04-11 RX ORDER — INSULIN GLARGINE 100 [IU]/ML
35 INJECTION, SOLUTION SUBCUTANEOUS DAILY
Qty: 9 ML | Refills: 1 | Status: SHIPPED | OUTPATIENT
Start: 2022-04-11 | End: 2022-12-08 | Stop reason: SDUPTHER

## 2022-04-19 ENCOUNTER — TELEPHONE (OUTPATIENT)
Dept: INTERNAL MEDICINE | Facility: CLINIC | Age: 52
End: 2022-04-19

## 2022-04-19 NOTE — TELEPHONE ENCOUNTER
Caller: Luis Quintana    Relationship: Self    Best call back number: [phone]      What medication are you requesting: ANTIBIOTIC, ZPAK    What are your current symptoms: SINUS ISSUES NOT FEELING WELL MUCUS BUILDING UP.    How long have you been experiencing symptoms: FEW DAYS NOW.    Have you had these symptoms before:    [x] Yes  [] No    Have you been treated for these symptoms before:   [x] Yes  [] No    If a prescription is needed, what is your preferred pharmacy and phone number: [id number] - [place], [place] - [address] [address] AT [facility] [address]  - [phone]  - [phone] FX     Additional notes:    HE DID MAKE MYCHART VIDEO VISIT FOR TOMORROW BUT WANTS TO START ON MEDICATION ASAP.

## 2022-04-27 ENCOUNTER — OFFICE VISIT (OUTPATIENT)
Dept: INTERNAL MEDICINE | Facility: CLINIC | Age: 52
End: 2022-04-27

## 2022-04-27 VITALS
BODY MASS INDEX: 28.11 KG/M2 | HEART RATE: 101 BPM | TEMPERATURE: 97.7 F | WEIGHT: 219 LBS | RESPIRATION RATE: 18 BRPM | SYSTOLIC BLOOD PRESSURE: 119 MMHG | HEIGHT: 74 IN | OXYGEN SATURATION: 95 % | DIASTOLIC BLOOD PRESSURE: 84 MMHG

## 2022-04-27 DIAGNOSIS — Z09 FOLLOW-UP EXAM: ICD-10-CM

## 2022-04-27 DIAGNOSIS — J01.11 ACUTE RECURRENT FRONTAL SINUSITIS: Primary | ICD-10-CM

## 2022-04-27 PROCEDURE — 99213 OFFICE O/P EST LOW 20 MIN: CPT | Performed by: NURSE PRACTITIONER

## 2022-04-27 RX ORDER — AZITHROMYCIN 250 MG/1
TABLET, FILM COATED ORAL
Qty: 6 TABLET | Refills: 0 | Status: SHIPPED | OUTPATIENT
Start: 2022-04-27 | End: 2022-11-30

## 2022-04-27 NOTE — PROGRESS NOTES
"Chief Complaint  Sinus Problem (Pt presents here today with concerns of a sinus infection. X 9 days.)    Subjective          Luis Quintana presents to [facility]  History of Present Illness    Patient is a pleasant 51-year-old male who typically sees Steven Gresham NP here in the office.  Patient is here for complaints of sinusitis.  Patient was seen at the urgent care by Dr. Lane on April 19, 2022 and was given clarithromycin and prednisone.  Patient is here for follow-up examination.    Hx of sinus infections with endoscopic surgeries.   Dr. Paul is his ENT   He is Covid vaccinated and he has no history of Covid infection in the past.     Patient still has complaints of facial pressure and discolored phlegm that is green and red in color at times.  Patient is requesting another antibiotic at this time even though he still has 2 doses of clarithromycin left.  Patient denies any fever, chills, loss of taste or smell, or shortness of breath on today's office visit.    Objective   Vital Signs:   /84 (BP Location: Right arm, Patient Position: Sitting, Cuff Size: Large Adult)   Pulse 101   Temp 97.7 °F (36.5 °C)   Resp 18   Ht 188 cm (74\")   Wt 99.3 kg (219 lb)   SpO2 95%   BMI 28.12 kg/m²     Physical Exam  Vitals and nursing note reviewed.   Constitutional:       Appearance: Normal appearance.   HENT:      Head: Normocephalic.      Mouth/Throat:      Mouth: Mucous membranes are moist.   Eyes:      Pupils: Pupils are equal, round, and reactive to light.   Cardiovascular:      Rate and Rhythm: Normal rate and regular rhythm.      Pulses: Normal pulses.      Heart sounds: Normal heart sounds.      Comments: No peripheral edema noted.   Pulmonary:      Effort: Pulmonary effort is normal. No respiratory distress.      Breath sounds: Normal breath sounds. No stridor. No wheezing, rhonchi or rales.   Chest:      Chest wall: No tenderness.   Musculoskeletal:         General: " Normal range of motion.      Cervical back: Normal range of motion.   Skin:     General: Skin is warm.      Capillary Refill: Capillary refill takes less than 2 seconds.   Neurological:      Mental Status: He is alert and oriented to person, place, and time.   Psychiatric:         Behavior: Behavior normal.        Result Review :            Patient Message with Delia Gresham III, NP-C (04/27/2022)  UC with Carroll Lane MD (04/19/2022)  Comprehensive Metabolic Panel (11/16/2021 2:08 PM)  Lipid Panel With LDL / HDL Ratio (11/16/2021 2:08 PM)  CBC (No Diff) (11/16/2021 2:08 PM)       Assessment and Plan    Diagnoses and all orders for this visit:    1. Acute recurrent frontal sinusitis (Primary)    2. Follow-up exam      At this time I do not feel it is necessary to give patient an antibiotic.  I will discuss this with his PCP.  We spoke in detail about antibiotic resistance and the need for antibiotics and how they can cause seeded.  At this time I feel patient should follow-up with his ears nose and throat doctor.  Patient is aware if he gets worse with any fever, chills, or shortness of breath he needs to be seen immediately.  Patient verbalizes understanding of treatment plan at this time.  I will speak with PCP and see if he agrees with this treatment plan or if he will send in an additional antibiotic at this time.  Patient will keep close record of his glucose at this time.           Follow Up   Return if symptoms worsen or fail to improve.  Patient was given instructions and counseling regarding his condition or for health maintenance advice. Please see specific information pulled into the AVS if appropriate.

## 2022-05-02 DIAGNOSIS — T78.40XD ALLERGY, SUBSEQUENT ENCOUNTER: ICD-10-CM

## 2022-05-02 DIAGNOSIS — E78.2 MIXED HYPERLIPIDEMIA: Chronic | ICD-10-CM

## 2022-05-02 DIAGNOSIS — I10 ESSENTIAL HYPERTENSION: Chronic | ICD-10-CM

## 2022-05-02 RX ORDER — ROSUVASTATIN CALCIUM 5 MG/1
5 TABLET, COATED ORAL DAILY
Qty: 90 TABLET | Refills: 1 | Status: SHIPPED | OUTPATIENT
Start: 2022-05-02 | End: 2022-11-04 | Stop reason: SDUPTHER

## 2022-05-02 RX ORDER — MONTELUKAST SODIUM 10 MG/1
10 TABLET ORAL NIGHTLY
Qty: 90 TABLET | Refills: 1 | Status: SHIPPED | OUTPATIENT
Start: 2022-05-02 | End: 2022-11-04 | Stop reason: SDUPTHER

## 2022-05-02 RX ORDER — LISINOPRIL 40 MG/1
40 TABLET ORAL DAILY
Qty: 90 TABLET | Refills: 1 | Status: SHIPPED | OUTPATIENT
Start: 2022-05-02 | End: 2022-11-04 | Stop reason: SDUPTHER

## 2022-07-16 NOTE — TELEPHONE ENCOUNTER
Luis Quintana in office today he needs to have a prescription sent to [facility] phone [phone] for BD 31 gauge short needles for insulin use    Sudhir reports to Rn that pt has stated she hears male voice in her head that is taunting her. Pt makes statement to sudhir that if she could she would kill the male in her head by slipping a noose around his head. Pt denyies feeling afraid or scared at this time when this RN questions how she is feeling

## 2022-09-23 ENCOUNTER — OFFICE VISIT (OUTPATIENT)
Dept: INTERNAL MEDICINE | Facility: CLINIC | Age: 52
End: 2022-09-23

## 2022-09-23 VITALS
WEIGHT: 215 LBS | HEIGHT: 74 IN | OXYGEN SATURATION: 95 % | HEART RATE: 99 BPM | TEMPERATURE: 98.6 F | DIASTOLIC BLOOD PRESSURE: 96 MMHG | SYSTOLIC BLOOD PRESSURE: 126 MMHG | BODY MASS INDEX: 27.59 KG/M2

## 2022-09-23 DIAGNOSIS — R14.0 ABDOMINAL BLOATING: Primary | ICD-10-CM

## 2022-09-23 DIAGNOSIS — Z12.11 ENCOUNTER FOR SCREENING COLONOSCOPY: ICD-10-CM

## 2022-09-23 PROCEDURE — 99213 OFFICE O/P EST LOW 20 MIN: CPT

## 2022-09-26 ENCOUNTER — TELEPHONE (OUTPATIENT)
Dept: INTERNAL MEDICINE | Facility: CLINIC | Age: 52
End: 2022-09-26

## 2022-09-26 DIAGNOSIS — R14.0 BLOATING: Primary | ICD-10-CM

## 2022-09-26 DIAGNOSIS — R10.10 PAIN OF UPPER ABDOMEN: ICD-10-CM

## 2022-09-26 DIAGNOSIS — R19.7 DIARRHEA, UNSPECIFIED TYPE: ICD-10-CM

## 2022-09-26 NOTE — TELEPHONE ENCOUNTER
Caller: Luis Quintana    Relationship: Self    Best call back number: [phone]    What orders are you requesting (i.e. lab or imaging): STOOLE SAMPLE    In what timeframe would the patient need to come in: ASAP    Where will you receive your lab/imaging services: OFFICE    Additional notes: PATIENT HAS BEEN HAVING BAD STOMACH PAIN, BLOATING AND SICKNESS TO STOMACH.  HE BELIEVES HE NEEDS A STOOLE SAMPLE TO CHECK FOR BACTERIA'S AND WORMS

## 2022-09-27 ENCOUNTER — TELEPHONE (OUTPATIENT)
Dept: INTERNAL MEDICINE | Facility: CLINIC | Age: 52
End: 2022-09-27

## 2022-10-03 ENCOUNTER — TELEPHONE (OUTPATIENT)
Dept: INTERNAL MEDICINE | Facility: CLINIC | Age: 52
End: 2022-10-03

## 2022-10-03 NOTE — TELEPHONE ENCOUNTER
Advised pt on referral to colonoscopy.      Pt is still having diarrhea after medication dosage change.     Make pt an appointment w/sen or Steven ?     Thank you  ERICK Butler

## 2022-10-03 NOTE — TELEPHONE ENCOUNTER
Caller: Luis Quintana    Relationship: Self    Best call back number:     What is the best time to reach you:     Who are you requesting to speak with (clinical staff, provider,  specific staff member):     Do you know the name of the person who called:     What was the call regarding: PATIENT IS CALLING IN STATING THAT HE WAS TO BE SCHEDULED A COLONOSCOPY AND HAS NOT HEARD FROM ANYONE.  HE SAYS HE STILL HAS DIARRHEA AND IS SCHEDULED TO GO OUT OF [place] COUN[place] ON 10[place]13/22 AND WANTS TO HAVE THIS DONE BEFORE HE LEAVES. HE SAYS HE HAS HAD A STOOL TEST(GCR TESTING) AND FEELS THAT EVERYTHING WAS NOT TESTED AND WANTS TO BE CALLED TO DISCUSS EVERYTHING.     Do you require a callback: YES

## 2022-10-04 NOTE — TELEPHONE ENCOUNTER
Patient is feeling a lot better , he realized that his behavioral health doctor upped his Wellbutrin. He has now stopped taking his welburtin cause of side effects upset stomach/diarrhea, and has had no upset stomach. Advised if it doesn't get any better to call and schedule an appt. He will also call to schedule his colonscopy when he gets back into town after 10/13.

## 2022-10-23 DIAGNOSIS — T78.40XD ALLERGY, SUBSEQUENT ENCOUNTER: ICD-10-CM

## 2022-10-23 DIAGNOSIS — E78.2 MIXED HYPERLIPIDEMIA: Chronic | ICD-10-CM

## 2022-10-23 RX ORDER — MONTELUKAST SODIUM 10 MG/1
10 TABLET ORAL NIGHTLY
Qty: 90 TABLET | Refills: 1 | Status: CANCELLED | OUTPATIENT
Start: 2022-10-23

## 2022-10-23 RX ORDER — ROSUVASTATIN CALCIUM 5 MG/1
5 TABLET, COATED ORAL DAILY
Qty: 90 TABLET | Refills: 1 | Status: CANCELLED | OUTPATIENT
Start: 2022-10-23

## 2022-10-24 DIAGNOSIS — E78.2 MIXED HYPERLIPIDEMIA: Chronic | ICD-10-CM

## 2022-10-24 DIAGNOSIS — T78.40XD ALLERGY, SUBSEQUENT ENCOUNTER: ICD-10-CM

## 2022-10-24 RX ORDER — ROSUVASTATIN CALCIUM 5 MG/1
5 TABLET, COATED ORAL DAILY
Qty: 90 TABLET | Refills: 1 | OUTPATIENT
Start: 2022-10-24

## 2022-10-24 RX ORDER — MONTELUKAST SODIUM 10 MG/1
10 TABLET ORAL NIGHTLY
Qty: 90 TABLET | Refills: 1 | OUTPATIENT
Start: 2022-10-24

## 2022-11-04 DIAGNOSIS — E78.2 MIXED HYPERLIPIDEMIA: Chronic | ICD-10-CM

## 2022-11-04 DIAGNOSIS — T78.40XD ALLERGY, SUBSEQUENT ENCOUNTER: ICD-10-CM

## 2022-11-04 DIAGNOSIS — I10 ESSENTIAL HYPERTENSION: Chronic | ICD-10-CM

## 2022-11-04 RX ORDER — LISINOPRIL 40 MG/1
40 TABLET ORAL DAILY
Qty: 90 TABLET | Refills: 0 | Status: SHIPPED | OUTPATIENT
Start: 2022-11-04

## 2022-11-04 RX ORDER — MONTELUKAST SODIUM 10 MG/1
10 TABLET ORAL NIGHTLY
Qty: 30 TABLET | Refills: 0 | Status: SHIPPED | OUTPATIENT
Start: 2022-11-04 | End: 2022-11-30 | Stop reason: SDUPTHER

## 2022-11-04 RX ORDER — ROSUVASTATIN CALCIUM 5 MG/1
5 TABLET, COATED ORAL DAILY
Qty: 30 TABLET | Refills: 0 | Status: SHIPPED | OUTPATIENT
Start: 2022-11-04 | End: 2022-11-30 | Stop reason: SDUPTHER

## 2022-11-04 RX ORDER — MONTELUKAST SODIUM 10 MG/1
10 TABLET ORAL NIGHTLY
Qty: 90 TABLET | Refills: 1 | Status: CANCELLED | OUTPATIENT
Start: 2022-11-04

## 2022-11-04 RX ORDER — ROSUVASTATIN CALCIUM 5 MG/1
5 TABLET, COATED ORAL DAILY
Qty: 90 TABLET | Refills: 1 | Status: CANCELLED | OUTPATIENT
Start: 2022-11-04

## 2022-11-23 ENCOUNTER — TELEPHONE (OUTPATIENT)
Dept: INTERNAL MEDICINE | Facility: CLINIC | Age: 52
End: 2022-11-23

## 2022-11-23 DIAGNOSIS — E10.9 TYPE 1 DIABETES MELLITUS WITHOUT COMPLICATION: ICD-10-CM

## 2022-11-23 RX ORDER — PROCHLORPERAZINE 25 MG/1
SUPPOSITORY RECTAL
Qty: 9 EACH | Refills: 1 | Status: SHIPPED | OUTPATIENT
Start: 2022-11-23

## 2022-11-23 NOTE — TELEPHONE ENCOUNTER
Caller: Luis Quintana    Relationship: Self    Best call back number: [phone]    What was the call regarding: PATIENT STATED HE MAY HAVE FELL A FEW WEEKS AGO, AND HE WOKE UP WITH BLOOD ON HIS T SHIRT UNDER HIS CHIN.    HE STATED THAT HE HAS BEEN PUTTING NEOSPORIN ON HIS CHIN, BUT NOW HE CAN FEEL A SCAR.    HE STATED IT FEELS BUMPY UNDER HIS SKIN.    HE WOULD LIKE TO KNOW WHAT TO DO ABOUT THIS.    PLEASE CALL TO DISCUSS AND ADVISE.    Do you require a callback: YES

## 2022-11-23 NOTE — TELEPHONE ENCOUNTER
Caller: Luis Quintana    Relationship: Self    Best call back number:   [phone] (Mobile)       Requested Prescriptions:   PATIENT STATES THAT HE NEEDS SENSORS FOR HIS DEXCOM, THAT [facility] SENT A REQUEST

## 2022-11-28 DIAGNOSIS — E10.9 TYPE 1 DIABETES MELLITUS WITHOUT COMPLICATION: ICD-10-CM

## 2022-11-28 RX ORDER — PROCHLORPERAZINE 25 MG/1
SUPPOSITORY RECTAL
Qty: 9 EACH | Refills: 1 | Status: CANCELLED | OUTPATIENT
Start: 2022-11-28

## 2022-11-28 NOTE — TELEPHONE ENCOUNTER
PATIENT IS CALLING TO CHECK THE STATUS OF THIS TELEPHONE ENCOUNTER.     PLEASE CALL TO DISCUSS AND ADVISE WITH PATIENT.

## 2022-11-28 NOTE — TELEPHONE ENCOUNTER
PATIENT STATED THAT THE SENSORS WAS SENT TO THE WRONG PHARMACY.     PATIENT STATED THE SENSORS NEED TO BE SENT TO THE [facility].     PLEASE MAKE SURE THIS IS SENT TO CORRECT PHARMACY AND CALL TO LET PATIENT KNOW.

## 2022-11-30 ENCOUNTER — OFFICE VISIT (OUTPATIENT)
Dept: INTERNAL MEDICINE | Facility: CLINIC | Age: 52
End: 2022-11-30

## 2022-11-30 VITALS
OXYGEN SATURATION: 98 % | DIASTOLIC BLOOD PRESSURE: 82 MMHG | SYSTOLIC BLOOD PRESSURE: 122 MMHG | HEIGHT: 74 IN | WEIGHT: 220.2 LBS | TEMPERATURE: 97.8 F | BODY MASS INDEX: 28.26 KG/M2 | HEART RATE: 90 BPM

## 2022-11-30 DIAGNOSIS — I10 ESSENTIAL HYPERTENSION: Primary | Chronic | ICD-10-CM

## 2022-11-30 DIAGNOSIS — S01.81XA LACERATION OF SKIN OF CHIN, INITIAL ENCOUNTER: ICD-10-CM
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
## 2022-11-30 DIAGNOSIS — T78.40XD ALLERGY, SUBSEQUENT ENCOUNTER: ICD-10-CM

## 2022-11-30 DIAGNOSIS — E10.9 TYPE 1 DIABETES MELLITUS WITHOUT COMPLICATION: Chronic | ICD-10-CM

## 2022-11-30 DIAGNOSIS — E78.2 MIXED HYPERLIPIDEMIA: Chronic | ICD-10-CM

## 2022-11-30 PROBLEM — Z79.4 LONG TERM CURRENT USE OF INSULIN (HCC): Status: ACTIVE | Noted: 2022-05-04

## 2022-11-30 PROBLEM — E66.3 OVERWEIGHT: Status: ACTIVE | Noted: 2022-05-04

## 2022-11-30 PROCEDURE — 99214 OFFICE O/P EST MOD 30 MIN: CPT | Performed by: NURSE PRACTITIONER

## 2022-11-30 RX ORDER — MONTELUKAST SODIUM 10 MG/1
10 TABLET ORAL NIGHTLY
Qty: 90 TABLET | Refills: 0 | Status: SHIPPED | OUTPATIENT
Start: 2022-11-30

## 2022-11-30 RX ORDER — ROSUVASTATIN CALCIUM 5 MG/1
5 TABLET, COATED ORAL DAILY
Qty: 90 TABLET | Refills: 0 | Status: SHIPPED | OUTPATIENT
Start: 2022-11-30

## 2022-11-30 NOTE — TELEPHONE ENCOUNTER
Rx Refill Note  Requested Prescriptions     Pending Prescriptions Disp Refills   • montelukast (SINGULAIR) 10 MG tablet 30 tablet 0     Sig: Take 1 tablet by mouth Every Night.   • rosuvastatin (Crestor) 5 MG tablet 30 tablet 0     Sig: Take 1 tablet by mouth Daily.      Last office visit with prescribing clinician: 11/16/2021   Last telemedicine visit with prescribing clinician: 11/30/2022   Next office visit with prescribing clinician: 11/30/2022     Carrie Bobo MA  11/30/22, 09:33 EST

## 2022-12-01 LAB
ALBUMIN SERPL-MCNC: 4.7 G/DL (ref 3.5–5.2)
ALBUMIN/GLOB SERPL: 2.1 G/DL
ALP SERPL-CCNC: 55 U/L (ref 39–117)
ALT SERPL-CCNC: 15 U/L (ref 1–41)
AST SERPL-CCNC: 22 U/L (ref 1–40)
BILIRUB SERPL-MCNC: 0.3 MG/DL (ref 0–1.2)
BUN SERPL-MCNC: 19 MG/DL (ref 6–20)
BUN/CREAT SERPL: 22.1 (ref 7–25)
CALCIUM SERPL-MCNC: 9.3 MG/DL (ref 8.6–10.5)
CHLORIDE SERPL-SCNC: 101 MMOL/L (ref 98–107)
CO2 SERPL-SCNC: 28.6 MMOL/L (ref 22–29)
CREAT SERPL-MCNC: 0.86 MG/DL (ref 0.76–1.27)
EGFRCR SERPLBLD CKD-EPI 2021: 105.5 ML/MIN/1.73
ERYTHROCYTE [DISTWIDTH] IN BLOOD BY AUTOMATED COUNT: 11.7 % (ref 12.3–15.4)
GLOBULIN SER CALC-MCNC: 2.2 GM/DL
GLUCOSE SERPL-MCNC: 168 MG/DL (ref 65–99)
HBA1C MFR BLD: 5.8 % (ref 4.8–5.6)
HCT VFR BLD AUTO: 42.3 % (ref 37.5–51)
HGB BLD-MCNC: 14 G/DL (ref 13–17.7)
MCH RBC QN AUTO: 32 PG (ref 26.6–33)
MCHC RBC AUTO-ENTMCNC: 33.1 G/DL (ref 31.5–35.7)
MCV RBC AUTO: 96.8 FL (ref 79–97)
PLATELET # BLD AUTO: 269 10*3/MM3 (ref 140–450)
POTASSIUM SERPL-SCNC: 5.4 MMOL/L (ref 3.5–5.2)
PROT SERPL-MCNC: 6.9 G/DL (ref 6–8.5)
RBC # BLD AUTO: 4.37 10*6/MM3 (ref 4.14–5.8)
SODIUM SERPL-SCNC: 136 MMOL/L (ref 136–145)
WBC # BLD AUTO: 5.03 10*3/MM3 (ref 3.4–10.8)

## 2022-12-05 ENCOUNTER — TELEPHONE (OUTPATIENT)
Dept: INTERNAL MEDICINE | Facility: CLINIC | Age: 52
End: 2022-12-05

## 2022-12-05 NOTE — TELEPHONE ENCOUNTER
ATTN: MADISON    Caller: Luis Quintana    Relationship: Self    Best call back number: [phone]    What is the best time to reach you: THIS MORNING AS SOON AS POSSIBLE FOR VILMA TO RETURN HIS CALL.  THIS CALL IS REGARDING HIS BLOOD SUGAR REQUEST FOR HIS DEXCOM 6 SENSORS ARE ON HOLD FOR ADDITIONAL INFORMATION (CHART NOTES FOR THE NECESSITY )    Who are you requesting to speak with (clinical staff, provider,  specific staff member): CLINICAL    What was the call regarding: [facility] WILL NEED THIS AS SOON AS POSSIBLE.      Do you require a callback: YES

## 2022-12-07 NOTE — TELEPHONE ENCOUNTER
Re faxed [facility] form with office notes 12/07/22. Patient is going to call saying that I re faxed papers today. Patient prescription is on hold.     ERICK Butler

## 2022-12-07 NOTE — TELEPHONE ENCOUNTER
Advised patient that I faxed over the [facility] papers and said successful received it. Patient is going to call in the morning regarding papers sent to see if they were received.

## 2022-12-07 NOTE — TELEPHONE ENCOUNTER
Patient has called in regards to papers that were faxed stating company did not receive them. Their fax given is [phone]    Also he states that he had mentioned to Steven during office visit that he needs a refill on his insulins both humalog and solostar He believes he had got a letter that possibly these will not be covered under insurance

## 2022-12-08 ENCOUNTER — TELEPHONE (OUTPATIENT)
Dept: INTERNAL MEDICINE | Facility: CLINIC | Age: 52
End: 2022-12-08

## 2022-12-08 DIAGNOSIS — E10.9 TYPE 1 DIABETES MELLITUS WITHOUT COMPLICATION: Primary | Chronic | ICD-10-CM

## 2022-12-08 RX ORDER — INSULIN GLARGINE 100 [IU]/ML
35 INJECTION, SOLUTION SUBCUTANEOUS DAILY
Qty: 9 ML | Refills: 1 | Status: SHIPPED | OUTPATIENT
Start: 2022-12-08 | End: 2022-12-09 | Stop reason: SDUPTHER

## 2022-12-08 NOTE — TELEPHONE ENCOUNTER
Called [facility] to let them know I faxed paper work over to [phone].

## 2022-12-08 NOTE — TELEPHONE ENCOUNTER
Caller: KRISTI- [facility]    Best call back number: [phone] EXTENSION [phone]    Who are you requesting to speak with (clinical staff, provider,  specific staff member): CLINICAL    What was the call regarding: KRISTI WITH [facility] STATES HE IS REQUESTING THE PRESCRIPTION FOR THE PATIENTS DEXCOM G6 SENSOR AND THE OFFICE NOTES FROM HIS LAST VISIT.     KRISTI STATES IF THE OFFICE CAN PUT THE ATTENTION TO KRISTI ON THE FAX AND TO FAX IT TO THEIR URGENT FAX [phone]

## 2022-12-09 ENCOUNTER — TELEPHONE (OUTPATIENT)
Dept: INTERNAL MEDICINE | Facility: CLINIC | Age: 52
End: 2022-12-09

## 2022-12-09 DIAGNOSIS — E10.9 TYPE 1 DIABETES MELLITUS WITHOUT COMPLICATION: Primary | Chronic | ICD-10-CM

## 2022-12-09 DIAGNOSIS — E10.9 TYPE 1 DIABETES MELLITUS WITHOUT COMPLICATION: Chronic | ICD-10-CM

## 2022-12-09 RX ORDER — INSULIN GLARGINE-YFGN 100 [IU]/ML
38 INJECTION, SOLUTION SUBCUTANEOUS DAILY
Qty: 20 ML | Refills: 0 | Status: SHIPPED | OUTPATIENT
Start: 2022-12-09 | End: 2022-12-09

## 2022-12-09 RX ORDER — INSULIN GLARGINE-YFGN 100 [IU]/ML
38 INJECTION, SOLUTION SUBCUTANEOUS DAILY
Qty: 100 ML | Refills: 0 | Status: SHIPPED | OUTPATIENT
Start: 2022-12-09

## 2022-12-09 NOTE — TELEPHONE ENCOUNTER
Caller: Luis Quintana    Relationship to patient: Self    Best call back number:[phone]    Patient is needing: PATIENT STATES THAT HE NEEDS INSULIN PENS INSTEAD OF VIALS. HAD REQUESTED TO SPEAK WITH ESTEPHANIA.

## 2022-12-09 NOTE — TELEPHONE ENCOUNTER
Pharmacy Name: [facility] - [place]     Pharmacy representative phone number: [phone]    What medication are you calling in regards to: SEMGLEE    What question does the pharmacy have: PLEASE CALL TO CLARIFY DOSAGE AND DIRECTIONS. THERE ARE TWO UNITS PER DAY DIRECTIONS THAT CAME THROUGH ON THE PRESCRIPTION.     Who is the provider that prescribed the medication: MARLEY GUILLERMO

## 2022-12-09 NOTE — TELEPHONE ENCOUNTER
Please call patient to verify he is taking 38 units - if so, update prescription and send.     WCN

## 2023-02-13 RX ORDER — LORAZEPAM 1 MG/1
1 TABLET ORAL 4 TIMES DAILY PRN
Qty: 120 TABLET | Refills: 1 | OUTPATIENT
Start: 2023-02-13

## 2023-02-13 NOTE — TELEPHONE ENCOUNTER
This has never been prescribed by me.   Needs to be sent to his psych provider.      There should not have been a contract on file with me either.     CATHY

## 2023-02-13 NOTE — TELEPHONE ENCOUNTER
Last office visit with prescribing clinician: 11/30/2022     Next office visit with prescribing clinician: 3/16/2023

## 2023-02-16 ENCOUNTER — PATIENT OUTREACH (OUTPATIENT)
Dept: CASE MANAGEMENT | Facility: OTHER | Age: 53
End: 2023-02-16
Payer: COMMERCIAL

## 2023-02-16 NOTE — OUTREACH NOTE
Patient Outreach    AMBULATORY CASE MANAGEMENT NOTE    Name and Relationship of Patient/Support Person: Luis Quintana B - Self    Spoke with patient regarding [employer] benefit  and [employer] and CM benefit. Pt reports he does a greet job managing his chronic condition. Reports he eats fresh fruit. Diagnosed with type 1 around 22 years old. States he uses CGM Dexcom and Clarity brook. Last a1c 5.7.   Pt declines any program needs or enrollment    Education provided for CM  program. Pt has follow up appointments scheduled.  No issue with social determinants,denies food, medication, transportation, or financial insecurities. No questions or concerns per pt at this time. Advised pt to call RN-ECM with any new needs. Encouraged use of [facility] nurseline if needed.  Agrees to follow up outreach.       Stuart KWONG  Ambulatory Case Management    2/16/2023, 12:54 EST

## 2023-03-16 ENCOUNTER — OFFICE VISIT (OUTPATIENT)
Dept: INTERNAL MEDICINE | Facility: CLINIC | Age: 53
End: 2023-03-16
Payer: COMMERCIAL

## 2023-03-16 VITALS
HEART RATE: 98 BPM | HEIGHT: 74 IN | TEMPERATURE: 97.8 F | OXYGEN SATURATION: 96 % | WEIGHT: 221.2 LBS | SYSTOLIC BLOOD PRESSURE: 122 MMHG | BODY MASS INDEX: 28.39 KG/M2 | DIASTOLIC BLOOD PRESSURE: 84 MMHG

## 2023-03-16 DIAGNOSIS — E10.9 TYPE 1 DIABETES MELLITUS WITHOUT COMPLICATION: Chronic | ICD-10-CM

## 2023-03-16 DIAGNOSIS — M25.512 CHRONIC LEFT SHOULDER PAIN: ICD-10-CM

## 2023-03-16 DIAGNOSIS — E55.9 VITAMIN D DEFICIENCY: Chronic | ICD-10-CM

## 2023-03-16 DIAGNOSIS — Z00.00 ANNUAL PHYSICAL EXAM: Primary | ICD-10-CM

## 2023-03-16 DIAGNOSIS — Z79.4 LONG TERM CURRENT USE OF INSULIN: ICD-10-CM

## 2023-03-16 DIAGNOSIS — E78.2 MIXED HYPERLIPIDEMIA: Chronic | ICD-10-CM

## 2023-03-16 DIAGNOSIS — F41.8 DEPRESSION WITH ANXIETY: Chronic | ICD-10-CM

## 2023-03-16 DIAGNOSIS — G89.29 CHRONIC LEFT SHOULDER PAIN: ICD-10-CM

## 2023-03-16 DIAGNOSIS — I10 ESSENTIAL HYPERTENSION: Chronic | ICD-10-CM

## 2023-03-16 DIAGNOSIS — T78.40XD ALLERGY, SUBSEQUENT ENCOUNTER: Chronic | ICD-10-CM

## 2023-03-16 DIAGNOSIS — J01.11 ACUTE RECURRENT FRONTAL SINUSITIS: ICD-10-CM

## 2023-03-16 DIAGNOSIS — B18.2 CHRONIC HEPATITIS C WITHOUT HEPATIC COMA: Chronic | ICD-10-CM

## 2023-03-16 PROCEDURE — 99396 PREV VISIT EST AGE 40-64: CPT | Performed by: NURSE PRACTITIONER

## 2023-03-16 PROCEDURE — 99214 OFFICE O/P EST MOD 30 MIN: CPT | Performed by: NURSE PRACTITIONER

## 2023-03-16 RX ORDER — AZITHROMYCIN 250 MG/1
TABLET, FILM COATED ORAL
Qty: 6 TABLET | Refills: 0 | Status: SHIPPED | OUTPATIENT
Start: 2023-03-16

## 2023-03-16 RX ORDER — INSULIN LISPRO 100 [IU]/ML
38 INJECTION, SOLUTION INTRAVENOUS; SUBCUTANEOUS DAILY
Qty: 90 ML | Refills: 1 | Status: SHIPPED | OUTPATIENT
Start: 2023-03-16

## 2023-03-16 NOTE — ASSESSMENT & PLAN NOTE
Your last A1C (3 month average) =   Lab Results   Component Value Date    HGBA1C 5.80 (H) 11/30/2022      Your diabetes is Controlled.    The American Diabetes Association recommends an A1C of less than 7%.  A1C Average Levels Blood Sugar:   6%  126 mg/dL  7%  154 mg/dL  8%  183 mg/dL  9%  212 mg/dL  10%  240 mg/dL  11%  269 mg/dL  12%  298 mg/dL    Glucose goals for many adults with diabetes  Blood sugar before meals  mg/dL  Blood sugar 1-2 hours after the start of a meal Less than 180 mg/dL A1C Less than 7%    Eye Health:   You need a diabetic eye exam yearly.   Please have a copy of the note faxed to my office.   Fax: [phone]    Foot Health:   You need a diabetic foot exam yearly.   Check your feet routinely for any wounds.   You should always check your shoes for any debris that could cause a wound.       Will refer to new endocrine.

## 2023-03-16 NOTE — ASSESSMENT & PLAN NOTE
Lipid abnormalities are improving with treatment.  Pharmacotherapy as ordered.  Lipids will be reassessed in 6 months.    Lab Results   Component Value Date    CHLPL 146 11/16/2021    TRIG 92 11/16/2021    HDL 64 11/16/2021    LDL 65 11/16/2021

## 2023-03-16 NOTE — PROGRESS NOTES
Chief Complaint  Annual Exam     Subjective:      History of Present Illness {CC  Problem List  Visit  Diagnosis   Encounters  Notes  Medications  Labs  Result Review Imaging  Media :23}     Luis Quintana presents to [facility] for:    Annual exam     He chronically has hypertension, diabetes (insulin dependent), hyperlipidemia, allergies (recurrent sinus infections with prior sinus surgery), anxiety/depression (chronically on lorazepam by psych).     Chronic left shoulder pain: states affects him when he puts on a jacket or reaches over for something.   He had BL shoulder pain - seen by ortho.  State he has PT and right has improved but left has not improved.   ? Old injury.  States no recent injury.     Sinus infection: recurrent: facial pain and congestion.    No F/C, SOA     No alcohol for 6 months     Vitamin d deficiency: he states family member is md in [place] and advised him to take D3 10,000 IU daily (two 5,000 IU tabs)     He saw Dr Caceres once but states he would like to see different endocrine.     Luis is here for coordination of medical care, to discuss health maintenance, disease prevention as well as to follow up on medical problems.     Patient Care Team:  Delia Gresham III NP-C as PCP - General (Internal Medicine)  Juaquin iRggs MD (Psychiatry)  Lupillo Patino MD as Consulting Physician (Gastroenterology)  Afshin Mike MD (Inactive) as Consulting Physician (Otolaryngology)      He states that his activity level is moderate.   His diet is diabetic.   Feels well with few complaints.     Health and Weight:   Weight trend is   Wt Readings from Last 4 Encounters:   03/16/23 100 kg (221 lb 3.2 oz)   02/22/23 95.3 kg (210 lb)   11/30/22 99.9 kg (220 lb 3.2 oz)   09/23/22 97.5 kg (215 lb)         Mental Health Check:   Sees behavioral health: prescribed ativan there  Gets anxious at times.   Doesn't enjoy his hobbies as  he did.   He will get a new  provider soon as prior has left practice and will discuss there.     Blood Pressure:   BP Readings from Last 3 Encounters:   03/16/23 122/84   02/22/23 110/72   11/30/22 122/82        Risk Evaluation:  1. Cardiovascular risk factors: hypertension, hyperlipidemia, diabetes mellitus, male gender.  2. Diabetes risk factors: patient has diabetes and being treated.   3. Cancer risk factors: diabetes.    Prevention:   Cholesterol and glucose screen due.   Hepatitis  C screen: [] Due  [] Completed :     Colon Cancer Screen:   He has no change in bowel habits.   Patient's last colonoscopy was ordered - he is to schedule.   Family history: [x] None    [] Yes:     Genital/ Urinary Health:   · He voids without difficulty    Testicular cancer Screen:   Testicular self exams recommended once a month.   Advised that any firm testicular nodules to be reported immediately.    Prostate cancer screening:  Family history: [x] None    [] Yes:     Lung Cancer Screen:   [x] Nonsmoker  [] History of smoking  []     Last eye exam:  Advise yearly for diabetes   Always where sunglass when outside with UV protection.       Skin Cancer:   Regular Sunsceen: Advised  Routine self assessment of your skin, report any changes.     I have reviewed patient's medical history, any new submitted information provided by patient or medical assistant and updated medical record.      Objective:      Physical Exam  Vitals reviewed.   Constitutional:       Appearance: Normal appearance. He is well-developed.   HENT:      Head:      Comments: Wearing mask due to COVID      Nose:      Right Sinus: Frontal sinus tenderness present.      Left Sinus: Frontal sinus tenderness present.   Neck:      Thyroid: No thyromegaly.   Cardiovascular:      Rate and Rhythm: Normal rate and regular rhythm.      Pulses: Normal pulses.      Heart sounds: Normal heart sounds.   Pulmonary:      Effort: Pulmonary effort is normal.      Breath sounds:  "Normal breath sounds.      Comments: E/U   Abdominal:      General: Bowel sounds are normal.      Palpations: Abdomen is soft.   Musculoskeletal:      Left shoulder: No swelling, deformity, bony tenderness or crepitus. Decreased range of motion.      Cervical back: Normal range of motion and neck supple.      Right lower leg: No edema.      Left lower leg: No edema.   Lymphadenopathy:      Cervical: No cervical adenopathy.   Skin:     General: Skin is warm and dry.      Capillary Refill: Capillary refill takes 2 to 3 seconds.   Neurological:      General: No focal deficit present.      Mental Status: He is alert and oriented to person, place, and time.      Motor: No weakness.   Psychiatric:         Mood and Affect: Mood normal.         Behavior: Behavior normal. Behavior is cooperative.         Thought Content: Thought content normal.         Judgment: Judgment normal.        Result Review  Data Reviewed:{ Labs  Result Review  Imaging  Med Tab  Media :23}     The following data was reviewed by: Delia Gresham III, NP-C on 03/16/2023  Common labs    Common Labs 11/30/22 11/30/22 11/30/22    1112 1112 1112   Glucose 168 (A)     BUN 19     Creatinine 0.86     Sodium 136     Potassium 5.4 (A)     Chloride 101     Calcium 9.3     Total Protein 6.9     Albumin 4.70     Total Bilirubin 0.3     Alkaline Phosphatase 55     AST (SGOT) 22     ALT (SGPT) 15     WBC  5.03    Hemoglobin  14.0    Hematocrit  42.3    Platelets  269    Hemoglobin A1C   5.80 (A)   (A) Abnormal value       Comments are available for some flowsheets but are not being displayed.                  Vital Signs:   /84 (BP Location: Left arm, Patient Position: Sitting, Cuff Size: Adult)   Pulse 98   Temp 97.8 °F (36.6 °C) (Temporal)   Ht 188 cm (74\")   Wt 100 kg (221 lb 3.2 oz)   SpO2 96%   BMI 28.40 kg/m²         Requested Prescriptions     Signed Prescriptions Disp Refills   • azithromycin (ZITHROMAX) 250 MG tablet 6 tablet 0 "     Sig: Take 2 tablets by mouth the first day, then 1 tablet daily for 4 days.   • Insulin Lispro, 1 Unit Dial, (HumaLOG KwikPen) 100 UNIT/ML solution pen-injector 90 mL 1     Sig: Inject 38 Units under the skin into the appropriate area as directed Daily.       Routine medications provided by this office will also be refilled via pharmacy request.       Current Outpatient Medications:   •  azithromycin (ZITHROMAX) 250 MG tablet, Take 2 tablets by mouth the first day, then 1 tablet daily for 4 days., Disp: 6 tablet, Rfl: 0  •  buPROPion XL (WELLBUTRIN XL) 150 MG 24 hr tablet, Take 1 tablet by mouth Every Morning., Disp: 30 tablet, Rfl: 0  •  Continuous Blood Gluc Sensor (Dexcom G6 Sensor), Every 10 (Ten) Days., Disp: 9 each, Rfl: 1  •  Continuous Blood Gluc Transmit (Dexcom G6 Transmitter) misc, 1 each Every 3 (Three) Months., Disp: 2 each, Rfl: 1  •  fexofenadine (ALLEGRA) 60 MG tablet, Take 1 tablet by mouth Daily., Disp: , Rfl:   •  fluticasone (FLONASE) 50 MCG/ACT nasal spray, 2 sprays into the nostril(s) as directed by provider Daily., Disp: , Rfl:   •  Insulin Glargine-yfgn 100 UNIT/ML solution pen-injector, Inject 38 Units under the skin into the appropriate area as directed Daily., Disp: 100 mL, Rfl: 0  •  Insulin Lispro, 1 Unit Dial, (HumaLOG KwikPen) 100 UNIT/ML solution pen-injector, Inject 38 Units under the skin into the appropriate area as directed Daily., Disp: 90 mL, Rfl: 1  •  Insulin Pen Needle (B-D ULTRAFINE III SHORT PEN) 31G X 8 MM misc, 1 each 4 (Four) Times a Day., Disp: 400 each, Rfl: 1  •  lisinopril (PRINIVIL,ZESTRIL) 40 MG tablet, Take 1 tablet by mouth Daily., Disp: 90 tablet, Rfl: 0  •  LORazepam (ATIVAN) 1 MG tablet, Take 1 tablet by mouth 3 (Three) Times a Day As Needed., Disp: 90 tablet, Rfl: 1  •  montelukast (SINGULAIR) 10 MG tablet, Take 1 tablet by mouth Every Night., Disp: 90 tablet, Rfl: 0  •  Multiple Vitamins-Minerals (MULTIVITAMIN WITH MINERALS) tablet tablet, Take 1 tablet  by mouth Daily., Disp: , Rfl:   •  rosuvastatin (Crestor) 5 MG tablet, Take 1 tablet by mouth Daily., Disp: 90 tablet, Rfl: 0  •  traZODone (DESYREL) 100 MG tablet, Take 1.5 tablets by mouth Every Night., Disp: 45 tablet, Rfl: 0     Assessment and Plan:      Assessment and Plan {CC Problem List  Visit Diagnosis  ROS  Review (Popup)  Health Maintenance  Quality  BestPractice  Medications  SmartSets  SnapShot Encounters  Media :23}     Problem List Items Addressed This Visit        Allergies and Adverse Reactions    Allergies (Chronic)    Overview     Chronic  2 sinus surgeries in the past - recurrent sinus infections              Cardiac and Vasculature    Essential hypertension (Chronic)    Current Assessment & Plan     Hypertension is improving with treatment.  Continue current treatment regimen.  Dietary sodium restriction.  Regular aerobic exercise.  Blood pressure will be reassessed at the next regular appointment.         Relevant Orders    Comprehensive Metabolic Panel    CBC (No Diff)    Mixed hyperlipidemia (Chronic)    Overview     Hx smoking         Current Assessment & Plan     Lipid abnormalities are improving with treatment.  Pharmacotherapy as ordered.  Lipids will be reassessed in 6 months.    Lab Results   Component Value Date    CHLPL 146 11/16/2021    TRIG 92 11/16/2021    HDL 64 11/16/2021    LDL 65 11/16/2021            Relevant Orders    Lipid Panel With LDL / HDL Ratio    CBC (No Diff)       Endocrine and Metabolic    Vitamin D deficiency (Chronic)    Relevant Orders    Vitamin D 25 hydroxy    Type 1 diabetes mellitus without complication (HCC) (Chronic)    Overview     Dx:   He has a Dexcom G6 to monitor glucose since 2019  3 sensors per month and 1 transmitter every 3 months - gets 3 months at a time.   Renal protection: lisinopril          Current Assessment & Plan     Your last A1C (3 month average) =   Lab Results   Component Value Date    HGBA1C 5.80 (H) 11/30/2022      Your  diabetes is Controlled.    The American Diabetes Association recommends an A1C of less than 7%.  A1C Average Levels Blood Sugar:   6%  126 mg/dL  7%  154 mg/dL  8%  183 mg/dL  9%  212 mg/dL  10%  240 mg/dL  11%  269 mg/dL  12%  298 mg/dL    Glucose goals for many adults with diabetes  Blood sugar before meals  mg/dL  Blood sugar 1-2 hours after the start of a meal Less than 180 mg/dL A1C Less than 7%    Eye Health:   You need a diabetic eye exam yearly.   Please have a copy of the note faxed to my office.   Fax: [phone]    Foot Health:   You need a diabetic foot exam yearly.   Check your feet routinely for any wounds.   You should always check your shoes for any debris that could cause a wound.       Will refer to new endocrine.          Relevant Medications    Insulin Lispro, 1 Unit Dial, (HumaLOG KwikPen) 100 UNIT/ML solution pen-injector    Other Relevant Orders    Hemoglobin A1c    Microalbumin / Creatinine Urine Ratio - Urine, Clean Catch    TSH    T4, free    Ambulatory Referral to Endocrinology    Long term current use of insulin (HCC)       Gastrointestinal Abdominal     Hepatitis C, chronic (G1b) (Chronic)    Overview     F3 fibrosis  Genotype 1b    Completed [facility] 2017             Mental Health    Depression with anxiety (Chronic)    Overview     Dr Riggs   Wellburtrin (at 300 mg caused diarrhea)   Also takes trazadone for insomnia   Prior treatment:  prozac         Other Visit Diagnoses     Annual physical exam    -  Primary    Relevant Orders    Comprehensive Metabolic Panel    CBC (No Diff)    Acute recurrent frontal sinusitis        Relevant Medications    azithromycin (ZITHROMAX) 250 MG tablet    Chronic left shoulder pain        Relevant Orders    Ambulatory Referral to Sports Medicine        Vitamin d: will check his level.    Advised this is more than I recommend.       Follow Up {Instructions Charge Capture  Follow-up Communications :23}     Return in about 6 months (around  9/16/2023) for will need dm foot exam .      Patient was given instructions and counseling regarding his condition or for health maintenance advice. Please see specific information pulled into the AVS if appropriate.    Carmen disclaimer:   Much of this encounter note is an electronic transcription/translation of spoken language to printed text. The electronic translation of spoken language may permit erroneous, or at times, nonsensical words or phrases to be inadvertently transcribed; Although I have reviewed the note for such errors, some may still exist.     Additional Patient Counseling:       Patient Instructions   Diet:    • Eat vegetables, fruits, whole grain, low-fat dairy, poultry, fish, beans, nontropical vegetable oils, and nuts, but avoid red meat (i.e., Mediterranean-style diet, DASH [Dietary Approaches to Stop Hypertension] diet).  • Limit sugary drinks and sweets.  • Limit saturated and trans fat to 5% to 6% of calories.  • Limit sodium intake to 2,400 mg daily (about one teaspoon table salt [kosher/sea salt have less sodium per teaspoon]).    Weight loss / Calorie Counting Apps:    • Lose It!   • MyFitness Pal     Exercise:   • Engage in moderate-to-vigorous aerobic activity for at least 40 minutes (on average) three to four times each week.    Wearables:   • Activity tracker   • Step tracker     Skin Care:   • Use sun screen SPF >30 daily  • Dermatologist for skin check regularly    Bone Health:   • [email or web address]    Mental Health:       CDC recommends Flu vaccines for everyone 6 months and older EVERY season with rare exceptions.

## 2023-03-16 NOTE — PATIENT INSTRUCTIONS
Diet:    Eat vegetables, fruits, whole grain, low-fat dairy, poultry, fish, beans, nontropical vegetable oils, and nuts, but avoid red meat (i.e., Mediterranean-style diet, DASH [Dietary Approaches to Stop Hypertension] diet).  Limit sugary drinks and sweets.  Limit saturated and trans fat to 5% to 6% of calories.  Limit sodium intake to 2,400 mg daily (about one teaspoon table salt [kosher/sea salt have less sodium per teaspoon]).    Weight loss / Calorie Counting Apps:    Lose It!   [employer] Pal     Exercise:   Engage in moderate-to-vigorous aerobic activity for at least 40 minutes (on average) three to four times each week.    Wearables:   Activity tracker   Step tracker     Skin Care:   Use sun screen SPF >30 daily  Dermatologist for skin check regularly    Bone Health:   [email or web address]    Mental Health:       CDC recommends Flu vaccines for everyone 6 months and older EVERY season with rare exceptions.

## 2023-03-17 LAB
25(OH)D3+25(OH)D2 SERPL-MCNC: 61.8 NG/ML (ref 30–100)
ALBUMIN SERPL-MCNC: 4.8 G/DL (ref 3.5–5.2)
ALBUMIN/CREAT UR: 3 MG/G CREAT (ref 0–29)
ALBUMIN/GLOB SERPL: 2.2 G/DL
ALP SERPL-CCNC: 56 U/L (ref 39–117)
ALT SERPL-CCNC: 18 U/L (ref 1–41)
AST SERPL-CCNC: 17 U/L (ref 1–40)
BILIRUB SERPL-MCNC: 0.3 MG/DL (ref 0–1.2)
BUN SERPL-MCNC: 26 MG/DL (ref 6–20)
BUN/CREAT SERPL: 25.5 (ref 7–25)
CALCIUM SERPL-MCNC: 10.1 MG/DL (ref 8.6–10.5)
CHLORIDE SERPL-SCNC: 101 MMOL/L (ref 98–107)
CHOLEST SERPL-MCNC: 160 MG/DL (ref 0–200)
CO2 SERPL-SCNC: 26.9 MMOL/L (ref 22–29)
CREAT SERPL-MCNC: 1.02 MG/DL (ref 0.76–1.27)
CREAT UR-MCNC: 114.7 MG/DL
EGFRCR SERPLBLD CKD-EPI 2021: 89.5 ML/MIN/1.73
ERYTHROCYTE [DISTWIDTH] IN BLOOD BY AUTOMATED COUNT: 11.9 % (ref 12.3–15.4)
GLOBULIN SER CALC-MCNC: 2.2 GM/DL
GLUCOSE SERPL-MCNC: 182 MG/DL (ref 65–99)
HBA1C MFR BLD: 5.3 % (ref 4.8–5.6)
HCT VFR BLD AUTO: 42.2 % (ref 37.5–51)
HDLC SERPL-MCNC: 61 MG/DL (ref 40–60)
HGB BLD-MCNC: 13.6 G/DL (ref 13–17.7)
LDLC SERPL CALC-MCNC: 78 MG/DL (ref 0–100)
LDLC/HDLC SERPL: 1.24 {RATIO}
MCH RBC QN AUTO: 30.7 PG (ref 26.6–33)
MCHC RBC AUTO-ENTMCNC: 32.2 G/DL (ref 31.5–35.7)
MCV RBC AUTO: 95.3 FL (ref 79–97)
MICROALBUMIN UR-MCNC: 3.6 UG/ML
PLATELET # BLD AUTO: 269 10*3/MM3 (ref 140–450)
POTASSIUM SERPL-SCNC: 5.8 MMOL/L (ref 3.5–5.2)
PROT SERPL-MCNC: 7 G/DL (ref 6–8.5)
RBC # BLD AUTO: 4.43 10*6/MM3 (ref 4.14–5.8)
SODIUM SERPL-SCNC: 136 MMOL/L (ref 136–145)
T4 FREE SERPL-MCNC: 1.01 NG/DL (ref 0.93–1.7)
TRIGL SERPL-MCNC: 117 MG/DL (ref 0–150)
TSH SERPL DL<=0.005 MIU/L-ACNC: 1.34 UIU/ML (ref 0.27–4.2)
VLDLC SERPL CALC-MCNC: 21 MG/DL (ref 5–40)
WBC # BLD AUTO: 5.76 10*3/MM3 (ref 3.4–10.8)

## 2023-03-23 ENCOUNTER — OFFICE VISIT (OUTPATIENT)
Dept: SPORTS MEDICINE | Facility: CLINIC | Age: 53
End: 2023-03-23
Payer: COMMERCIAL

## 2023-03-23 VITALS
HEART RATE: 102 BPM | HEIGHT: 74 IN | TEMPERATURE: 98.9 F | OXYGEN SATURATION: 98 % | SYSTOLIC BLOOD PRESSURE: 142 MMHG | BODY MASS INDEX: 27.59 KG/M2 | WEIGHT: 215 LBS | DIASTOLIC BLOOD PRESSURE: 82 MMHG

## 2023-03-23 DIAGNOSIS — M25.512 LEFT SHOULDER PAIN, UNSPECIFIED CHRONICITY: Primary | ICD-10-CM

## 2023-03-23 RX ORDER — TRIAMCINOLONE ACETONIDE 40 MG/ML
40 INJECTION, SUSPENSION INTRA-ARTICULAR; INTRAMUSCULAR ONCE
Status: COMPLETED | OUTPATIENT
Start: 2023-03-23 | End: 2023-03-23

## 2023-03-23 RX ADMIN — TRIAMCINOLONE ACETONIDE 40 MG: 40 INJECTION, SUSPENSION INTRA-ARTICULAR; INTRAMUSCULAR at 15:53

## 2023-03-23 NOTE — PROGRESS NOTES
"Luis is a 52 y.o. year old male here today for consultation requested by Dr. Gresham    Chief Complaint   Patient presents with   • Shoulder Pain     SHOULDER PAIN- LEFT SIDE- patient states that he has been having chronic pain and has noticed the pain has became worse since 2021. Doing certain ROM or sleep on his shoulder pain increases       History of Present Illness  Here today for left shoulder pain.  He describes pain that has been intermittently present and worse with home exercises, associated with stiffness.  He was treated with physical therapy for the right side in particular during 2021 and noted improvement.  He also received an injection at that time which helped.  He describes sharp pain, worse with activity, without specific radiation.  Moderately severe.    I have reviewed the patient's medical, family, and social history in detail and updated the computerized patient record.    Review of Systems   Constitutional: Negative for fever.   Musculoskeletal:        Per HPI   Skin: Negative for wound.   Neurological: Negative for numbness.   All other systems reviewed and are negative.      /82 (BP Location: Right arm, Patient Position: Sitting, Cuff Size: Adult)   Pulse 102   Temp 98.9 °F (37.2 °C) (Temporal)   Ht 188 cm (74.02\")   Wt 97.5 kg (215 lb)   SpO2 98%   BMI 27.59 kg/m²        Physical Exam    Vital signs reviewed.   General: No acute distress.  Eyes: conjunctiva clear; pupils equally round and reactive  ENT: external ears and nose atraumatic; oropharynx clear  CV: no peripheral edema, 2+ distal pulses  Resp: normal respiratory effort, no use of accessory muscles  Skin: no rashes or wounds; normal turgor  Psych: mood and affect appropriate; recent and remote memory intact  Neuro: sensation to light touch intact    MSK Exam:  Left Shoulder Exam     Tenderness   Left shoulder tenderness location: subacromial space.    Range of Motion   The patient has normal left shoulder ROM.  Left " "shoulder active abduction: painful.   Left shoulder internal rotation 0 degrees: painful.     Muscle Strength   The patient has normal left shoulder strength.    Tests   Paul test: positive  Impingement: positive  Drop arm: negative    Other   Erythema: absent  Sensation: normal         Left Shoulder X-Ray  Indication: Pain  Views: AP Internal and External Rotation    Findings:  No fracture  No bony lesion  Normal soft tissues  Normal joint spaces    No prior studies were available for comparison.     Shoulder Injection Procedure Note    Left shoulder subacromial bursa injection was discussed with the patient in detail, including indication, risks, benefits, and alternatives. Verbal consent was given for the procedure. Injection was performed by MD.  Injection site was identified by physical examination and cleaned with Betadine and alcohol swabs. Prior to needle insertion, ethyl chloride spray was used for surface anesthesia. Sterile technique was used.  A 25-gauge, 1.5\" needle was directed to the joint from a posterior approach. Injectate was passed into the subacromial bursa without difficulty. The needle was removed and a simple bandage was applied. The procedure was tolerated well without difficulty.    Injection mixture:  1% lidocaine without epinephrine: 2 mL  40 mg/mL triamcinolone acetonide: 1 mL     Diagnoses and all orders for this visit:    Left shoulder pain, unspecified chronicity  -     XR Shoulder 2+ View Left  -     triamcinolone acetonide (KENALOG-40) injection 40 mg      Clinically appears most consistent with impingement today.  No evidence of frozen shoulder or rotator cuff weakness.  We discussed options and agreed on subacromial injection today which was tolerated well.  Continue home therapy exercises.  Follow-up if not improving as anticipated over the next several weeks.    EMR Dragon/Transcription disclaimer:    Much of this encounter note is an electronic transcription/translation of " spoken language to printed text.  The electronic translation of spoken language may permit erroneous, or at times, nonsensical words or phrases to be inadvertently transcribed.  Although I have reviewed the note for such errors some may still exist.

## 2023-03-24 ENCOUNTER — PATIENT ROUNDING (BHMG ONLY) (OUTPATIENT)
Dept: SPORTS MEDICINE | Facility: CLINIC | Age: 53
End: 2023-03-24
Payer: COMMERCIAL

## 2023-03-24 NOTE — PROGRESS NOTES
March 24, 2023    A [employer] Message has been sent to the patient for PATIENT ROUNDING with [facility]

## 2023-03-30 NOTE — PROGRESS NOTES
Kenalog injection was administered in office 03/23/2023; not patient supplied   NDC: [address]-1  LOT: [id number]  EXP DATE:09/01/2024

## 2023-04-28 ENCOUNTER — TELEPHONE (OUTPATIENT)
Dept: INTERNAL MEDICINE | Facility: CLINIC | Age: 53
End: 2023-04-28

## 2023-04-28 ENCOUNTER — OFFICE VISIT (OUTPATIENT)
Dept: INTERNAL MEDICINE | Facility: CLINIC | Age: 53
End: 2023-04-28
Payer: COMMERCIAL

## 2023-04-28 VITALS
BODY MASS INDEX: 27.21 KG/M2 | SYSTOLIC BLOOD PRESSURE: 120 MMHG | HEART RATE: 96 BPM | OXYGEN SATURATION: 99 % | DIASTOLIC BLOOD PRESSURE: 80 MMHG | HEIGHT: 74 IN | WEIGHT: 212 LBS

## 2023-04-28 DIAGNOSIS — J01.41 ACUTE RECURRENT PANSINUSITIS: Primary | ICD-10-CM

## 2023-04-28 PROCEDURE — 99213 OFFICE O/P EST LOW 20 MIN: CPT | Performed by: NURSE PRACTITIONER

## 2023-04-28 RX ORDER — AZITHROMYCIN 250 MG/1
TABLET, FILM COATED ORAL
Qty: 6 TABLET | Refills: 0 | Status: SHIPPED | OUTPATIENT
Start: 2023-04-28

## 2023-04-28 NOTE — PROGRESS NOTES
Chief Complaint  Sinus Problem and Headache     Subjective:      History of Present Illness {CC  Problem List  Visit  Diagnosis   Encounters  Notes  Medications  Labs  Result Review Imaging  Media :23}     Luis Quintana presents to [facility] for:      Sinus Problem  This is a recurrent problem. The current episode started 1 to 4 weeks ago. The problem has been waxing and waning since onset. There has been no fever. Associated symptoms include congestion and sinus pressure. Pertinent negatives include no shortness of breath, sore throat or swollen glands. Treatments tried: z-pack. The treatment provided mild relief.        I have reviewed patient's medical history, any new submitted information provided by patient or medical assistant and updated medical record.      Objective:      Physical Exam  Constitutional:       Appearance: Normal appearance.   HENT:      Nose:      Right Sinus: Maxillary sinus tenderness and frontal sinus tenderness present.      Left Sinus: Maxillary sinus tenderness and frontal sinus tenderness present.      Mouth/Throat:      Pharynx: No posterior oropharyngeal erythema.   Eyes:      Conjunctiva/sclera: Conjunctivae normal.   Cardiovascular:      Rate and Rhythm: Normal rate.   Musculoskeletal:      Cervical back: Neck supple.   Lymphadenopathy:      Cervical: No cervical adenopathy.   Neurological:      Mental Status: He is oriented to person, place, and time.        Result Review  Data Reviewed:{ Labs  Result Review  Imaging  Med Tab  Media :23}     The following data was reviewed by: Delia Gresham III, NP-C on 04/28/2023  Common labs        11/30/2022    11:12 3/16/2023    12:29   Common Labs   Glucose 168   182     BUN 19   26     Creatinine 0.86   1.02     Sodium 136   136     Potassium 5.4   5.8     Chloride 101   101     Calcium 9.3   10.1     Total Protein 6.9   7.0     Albumin 4.70   4.8     Total Bilirubin 0.3    "0.3     Alkaline Phosphatase 55   56     AST (SGOT) 22   17     ALT (SGPT) 15   18     WBC 5.03   5.76     Hemoglobin 14.0   13.6     Hematocrit 42.3   42.2     Platelets 269   269     Total Cholesterol  160     Triglycerides  117     HDL Cholesterol  61     LDL Cholesterol   78     Hemoglobin A1C 5.80   5.30     Microalbumin, Urine  3.6                  Vital Signs:   /80 (BP Location: Left arm, Patient Position: Sitting, Cuff Size: Adult)   Pulse 96   Ht 188 cm (74\")   Wt 96.2 kg (212 lb)   SpO2 99%   BMI 27.22 kg/m²         Requested Prescriptions     Signed Prescriptions Disp Refills   • azithromycin (Zithromax Z-Rashawn) 250 MG tablet 6 tablet 0     Sig: Take 2 tablets by mouth on day 1, then 1 tablet daily on days 2-5       Routine medications provided by this office will also be refilled via pharmacy request.       Current Outpatient Medications:   •  ARIPiprazole (ABILIFY) 5 MG tablet, Take 1 tablet by mouth Daily., Disp: 30 tablet, Rfl: 2  •  buPROPion XL (WELLBUTRIN XL) 150 MG 24 hr tablet, Take 1 tablet by mouth Every Morning., Disp: 30 tablet, Rfl: 2  •  Continuous Blood Gluc Sensor (Dexcom G6 Sensor), Every 10 (Ten) Days., Disp: 9 each, Rfl: 1  •  Continuous Blood Gluc Transmit (Dexcom G6 Transmitter) misc, 1 each Every 3 (Three) Months., Disp: 2 each, Rfl: 1  •  Insulin Glargine-yfgn 100 UNIT/ML solution pen-injector, Inject 38 Units under the skin into the appropriate area as directed Daily., Disp: 100 mL, Rfl: 0  •  Insulin Lispro, 1 Unit Dial, (HumaLOG KwikPen) 100 UNIT/ML solution pen-injector, Inject 38 Units under the skin into the appropriate area as directed Daily., Disp: 90 mL, Rfl: 1  •  Insulin Pen Needle (B-D ULTRAFINE III SHORT PEN) 31G X 8 MM misc, 1 each 4 (Four) Times a Day., Disp: 400 each, Rfl: 1  •  lisinopril (PRINIVIL,ZESTRIL) 40 MG tablet, Take 1 tablet by mouth Daily., Disp: 90 tablet, Rfl: 0  •  LORazepam (ATIVAN) 1 MG tablet, Take 1 tablet by mouth 3 (Three) Times a Day " As Needed., Disp: 90 tablet, Rfl: 1  •  lurasidone (Latuda) 40 MG tablet tablet, Take 1 tablet by mouth Daily., Disp: 30 tablet, Rfl: 2  •  montelukast (SINGULAIR) 10 MG tablet, Take 1 tablet by mouth Every Night., Disp: 90 tablet, Rfl: 0  •  Multiple Vitamins-Minerals (MULTIVITAMIN WITH MINERALS) tablet tablet, Take 1 tablet by mouth Daily., Disp: , Rfl:   •  rosuvastatin (Crestor) 5 MG tablet, Take 1 tablet by mouth Daily., Disp: 90 tablet, Rfl: 0  •  traZODone (DESYREL) 100 MG tablet, Take 1.5 tablets by mouth Every Night., Disp: 45 tablet, Rfl: 2  •  traZODone (DESYREL) 150 MG tablet, Take 1 tablet by mouth Every Night., Disp: 30 tablet, Rfl: 2  •  azithromycin (Zithromax Z-Rashawn) 250 MG tablet, Take 2 tablets by mouth on day 1, then 1 tablet daily on days 2-5, Disp: 6 tablet, Rfl: 0  •  LORazepam (ATIVAN) 1 MG tablet, Take 1 tablet by mouth 4 (Four) Times a Day As Needed., Disp: 120 tablet, Rfl: 2     Assessment and Plan:      Assessment and Plan {CC Problem List  Visit Diagnosis  ROS  Review (Popup)  Health Maintenance  Quality  BestPractice  Medications  SmartSets  SnapShot Encounters  Media :23}     Problem List Items Addressed This Visit    None  Visit Diagnoses     Acute recurrent pansinusitis    -  Primary    Relevant Medications    azithromycin (Zithromax Z-Rashawn) 250 MG tablet          Follow Up {Instructions Charge Capture  Follow-up Communications :23}     Return if symptoms worsen or fail to improve.      Patient was given instructions and counseling regarding his condition or for health maintenance advice. Please see specific information pulled into the AVS if appropriate.    Dragon disclaimer:   Much of this encounter note is an electronic transcription/translation of spoken language to printed text. The electronic translation of spoken language may permit erroneous, or at times, nonsensical words or phrases to be inadvertently transcribed; Although I have reviewed the note for such  errors, some may still exist.     Additional Patient Counseling:       Patient Instructions   You are diagnosed with sinusitis or a sinus infection.     Adequate rest and hydration, warm facial packs, and steam inhalation may be useful. Increase fluid intake. Use warm or cool mist vaporizer at night. Over the counter medications such as nasal saline spray and Mucinex may help with clearing congestion. Use motrin or Tylenol for fever or pain. Increase your intake of clear decaffinated fluids and get plenty of rest.   If symptoms worsen or does not improve, notify your provider.       HOME CARE INSTRUCTIONS  · Drink plenty of water. Water helps thin the mucus so your sinuses can drain more easily.  · Apply a warm, moist washcloth to your face 3-4 times a day.   · Use saline nasal sprays to help moisten and clean your sinuses.  · If you were prescribed an antibiotic finish it all even if you start to feel better.    SEEK IMMEDIATE MEDICAL CARE IF:  · You have increasing pain or severe headaches.  · You have nausea, vomiting, or drowsiness.  · You have swelling around your face.  · You have vision problems.  · You have a stiff neck.  · You have difficulty breathing.

## 2023-04-28 NOTE — TELEPHONE ENCOUNTER
Patient was calling to let us know that he needs his lorazepam refilled. I advised we cannot refill because sommer wasn't the one who prescribed the medication. Patient talked with the pharmacy and is getting medication fixed.

## 2023-04-28 NOTE — PATIENT INSTRUCTIONS
You are diagnosed with sinusitis or a sinus infection.     Adequate rest and hydration, warm facial packs, and steam inhalation may be useful. Increase fluid intake. Use warm or cool mist vaporizer at night. Over the counter medications such as nasal saline spray and Mucinex may help with clearing congestion. Use motrin or Tylenol for fever or pain. Increase your intake of clear decaffinated fluids and get plenty of rest.   If symptoms worsen or does not improve, notify your provider.       HOME CARE INSTRUCTIONS  · Drink plenty of water. Water helps thin the mucus so your sinuses can drain more easily.  · Apply a warm, moist washcloth to your face 3-4 times a day.   · Use saline nasal sprays to help moisten and clean your sinuses.  · If you were prescribed an antibiotic finish it all even if you start to feel better.    SEEK IMMEDIATE MEDICAL CARE IF:  · You have increasing pain or severe headaches.  · You have nausea, vomiting, or drowsiness.  · You have swelling around your face.  · You have vision problems.  · You have a stiff neck.  · You have difficulty breathing.

## 2023-04-28 NOTE — TELEPHONE ENCOUNTER
Caller: Luis Quintana    Relationship to patient: Self    Best call back number: [phone]    Patient is needing: PATIENT STATES THAT MARLEY GUILLERMO REMOVED   FROM PATIENTS CHART. PATIENT NEEDS THIS MEDICATION, IT SHOULD NOT HAVE BEEN REMOVED.   PLEASE ADVISE    PATIENT IS AT PHARMACY WAITING FOR MEDICATION

## 2023-06-13 ENCOUNTER — TELEPHONE (OUTPATIENT)
Dept: INTERNAL MEDICINE | Facility: CLINIC | Age: 53
End: 2023-06-13
Payer: COMMERCIAL

## 2023-06-13 NOTE — TELEPHONE ENCOUNTER
Caller: Luis Quintana    Relationship: Self    Best call back number: [phone]     What medication are you requesting: Z-PACK    What are your current symptoms: SINUS INFECTION     How long have you been experiencing symptoms: 1 WEEK     Have you had these symptoms before:    [x] Yes  [] No    Have you been treated for these symptoms before:   [x] Yes  [] No    If a prescription is needed, what is your preferred pharmacy and phone number: [id number] - [place], [place] - [address] [address] AT [facility] [address]  - [phone]  - [phone]      Additional notes: PATIENT WOULD LIKE A CALL BACK TO UPDATE HIM ON THE STATUS OF HIS REQUEST.

## 2023-07-31 DIAGNOSIS — I10 ESSENTIAL HYPERTENSION: Chronic | ICD-10-CM

## 2023-08-01 RX ORDER — LISINOPRIL 40 MG/1
40 TABLET ORAL DAILY
Qty: 90 TABLET | Refills: 0 | Status: SHIPPED | OUTPATIENT
Start: 2023-08-01

## 2023-08-31 ENCOUNTER — TELEPHONE (OUTPATIENT)
Dept: SPORTS MEDICINE | Facility: CLINIC | Age: 53
End: 2023-08-31

## 2023-08-31 NOTE — TELEPHONE ENCOUNTER
Caller: MARYANN     Relationship to patient: SELF     Best call back number: [phone]    Chief complaint:  LEFT SHOULDER     Type of visit: INJECTION    Requested date: NEXT AVAIL      Additional notes: LAST INJ MARCH 2023

## 2023-09-01 ENCOUNTER — PROCEDURE VISIT (OUTPATIENT)
Dept: SPORTS MEDICINE | Facility: CLINIC | Age: 53
End: 2023-09-01
Payer: COMMERCIAL

## 2023-09-01 VITALS
OXYGEN SATURATION: 99 % | DIASTOLIC BLOOD PRESSURE: 74 MMHG | TEMPERATURE: 98.6 F | HEART RATE: 83 BPM | HEIGHT: 74 IN | SYSTOLIC BLOOD PRESSURE: 122 MMHG | BODY MASS INDEX: 26.31 KG/M2 | WEIGHT: 205 LBS

## 2023-09-01 DIAGNOSIS — M25.512 CHRONIC LEFT SHOULDER PAIN: Primary | ICD-10-CM

## 2023-09-01 DIAGNOSIS — G89.29 CHRONIC LEFT SHOULDER PAIN: Primary | ICD-10-CM

## 2023-09-01 DIAGNOSIS — M25.819 SHOULDER IMPINGEMENT: ICD-10-CM

## 2023-09-01 DIAGNOSIS — S93.491A SPRAIN OF ANTERIOR TALOFIBULAR LIGAMENT OF RIGHT ANKLE, INITIAL ENCOUNTER: ICD-10-CM

## 2023-09-01 RX ORDER — LEVOFLOXACIN 500 MG/1
1 TABLET, FILM COATED ORAL DAILY
COMMUNITY
Start: 2023-07-14

## 2023-09-01 RX ORDER — TRIAMCINOLONE ACETONIDE 40 MG/ML
40 INJECTION, SUSPENSION INTRA-ARTICULAR; INTRAMUSCULAR ONCE
Status: COMPLETED | OUTPATIENT
Start: 2023-09-01 | End: 2023-09-01

## 2023-09-01 RX ADMIN — TRIAMCINOLONE ACETONIDE 40 MG: 40 INJECTION, SUSPENSION INTRA-ARTICULAR; INTRAMUSCULAR at 15:01

## 2023-09-01 NOTE — PROGRESS NOTES
Patient did not supply medication Kenalog This was administered in office. do bill patient. This IS Buy and Bill.   NDC: [address]-8  LOT: [id number]  EXP DATE: 04/01/2025

## 2023-09-01 NOTE — PROGRESS NOTES
"Luis is a 53 y.o. year old male     Chief Complaint   Patient presents with    Shoulder Pain     LFT SHOULDER- patient in office to get steroid injection        History of Present Illness  HPI   L shoulder recurrent pain starting a few weeks ago.  Received decent relief from steroid injection at the last visit.  Admits he did not do his home exercises.  Fell and injured R foot 5 weeks ago having some persistent pain around the ankle      Review of Systems    /74 (BP Location: Left arm, Patient Position: Sitting, Cuff Size: Adult)   Pulse 83   Temp 98.6 øF (37 øC) (Temporal)   Ht 188 cm (74.02\")   Wt 93 kg (205 lb)   SpO2 99%   BMI 26.31 kg/mý      Physical Exam    Vital signs reviewed.   General: No acute distress.      MSK Exam:  Ortho Exam  Left shoulder: Normal appearance.  No tenderness.  Normal range of motion.  Positive painful arc and Neer.  Negative Paul.  Normal rotator cuff strength.  There is mild pain with empty can maneuver.  Negative Speed and Yergason.  Negative Harlan.  Right ankle: Normal appearance.  Tenderness palpation overlying the ATFL.  Normal range of motion.  No ligamentous laxity.  No tenderness on the metatarsals.    Shoulder Injection Procedure Note    Left shoulder subacromial bursa injection was discussed with the patient in detail, including indication, risks, benefits, and alternatives. Verbal consent was given for the procedure. Injection was performed by MD.  Injection site was identified by physical examination and cleaned with Betadine and alcohol swabs. Prior to needle insertion, ethyl chloride spray was used for surface anesthesia. Sterile technique was used.  A 25-gauge, 1.5\" needle was directed to the joint from a posterior approach. Injectate was passed into the subacromial bursa without difficulty. The needle was removed and a simple bandage was applied. The procedure was tolerated well without difficulty.    Injection mixture:  1% lidocaine without " epinephrine: 2 mL  40 mg/mL triamcinolone acetonide: 1 mL       Diagnoses and all orders for this visit:    Chronic left shoulder pain  -     triamcinolone acetonide (KENALOG-40) injection 40 mg  -     Ambulatory Referral to Physical Therapy Evaluate and treat    Sprain of anterior talofibular ligament of right ankle, initial encounter  -     Ambulatory Referral to Physical Therapy Evaluate and treat    Shoulder impingement  -     triamcinolone acetonide (KENALOG-40) injection 40 mg  -     Ambulatory Referral to Physical Therapy Evaluate and treat    Other orders  -     levoFLOXacin (LEVAQUIN) 500 MG tablet; Take 1 tablet by mouth Daily.    Repeat injection to left shoulder for impingement.  Plan physical therapy for definitive measures, discussed planning long-term success not just temporary relief from injections.  Also plan PT for resolving sprain to the right ankle  Recheck in 1 month      EMR Dragon/Transcription disclaimer:    Much of this encounter note is an electronic transcription/translation of spoken language to printed text.  The electronic translation of spoken language may permit erroneous, or at times, nonsensical words or phrases to be inadvertently transcribed.  Although I have reviewed the note for such errors some may still exist.

## 2023-09-05 NOTE — TELEPHONE ENCOUNTER
Rx Refill Note  Requested Prescriptions     Pending Prescriptions Disp Refills    traZODone (DESYREL) 150 MG tablet 30 tablet 0     Sig: Take 1 tablet by mouth Every Night.      Last office visit with prescribing clinician: 4/28/2023   Last telemedicine visit with prescribing clinician: Visit date not found   Next office visit with prescribing clinician: 9/22/2023         Carrie Bobo MA  09/05/23, 15:17 EDT

## 2023-09-06 RX ORDER — TRAZODONE HYDROCHLORIDE 150 MG/1
150 TABLET ORAL NIGHTLY
Qty: 30 TABLET | Refills: 0 | Status: SHIPPED | OUTPATIENT
Start: 2023-09-06

## 2023-09-22 ENCOUNTER — OFFICE VISIT (OUTPATIENT)
Dept: INTERNAL MEDICINE | Facility: CLINIC | Age: 53
End: 2023-09-22

## 2023-09-22 VITALS
SYSTOLIC BLOOD PRESSURE: 120 MMHG | OXYGEN SATURATION: 99 % | HEART RATE: 103 BPM | DIASTOLIC BLOOD PRESSURE: 80 MMHG | WEIGHT: 219.6 LBS | HEIGHT: 74 IN | BODY MASS INDEX: 28.18 KG/M2

## 2023-09-22 DIAGNOSIS — T78.40XD ALLERGY, SUBSEQUENT ENCOUNTER: Chronic | ICD-10-CM

## 2023-09-22 DIAGNOSIS — Z12.11 COLON CANCER SCREENING: ICD-10-CM

## 2023-09-22 DIAGNOSIS — E10.9 TYPE 1 DIABETES MELLITUS WITHOUT COMPLICATION: Chronic | ICD-10-CM

## 2023-09-22 DIAGNOSIS — Z23 NEED FOR PNEUMOCOCCAL VACCINATION: ICD-10-CM

## 2023-09-22 DIAGNOSIS — F41.8 DEPRESSION WITH ANXIETY: Chronic | ICD-10-CM

## 2023-09-22 DIAGNOSIS — N52.01 ERECTILE DYSFUNCTION DUE TO ARTERIAL INSUFFICIENCY: ICD-10-CM

## 2023-09-22 DIAGNOSIS — I10 ESSENTIAL HYPERTENSION: Primary | Chronic | ICD-10-CM

## 2023-09-22 RX ORDER — SILDENAFIL 100 MG/1
100 TABLET, FILM COATED ORAL DAILY PRN
Qty: 20 TABLET | Refills: 2 | Status: SHIPPED | OUTPATIENT
Start: 2023-09-22

## 2023-09-22 RX ORDER — ACYCLOVIR 400 MG/1
1 TABLET ORAL DAILY
Start: 2023-09-22

## 2023-09-22 RX ORDER — INSULIN LISPRO 100 [IU]/ML
38 INJECTION, SOLUTION INTRAVENOUS; SUBCUTANEOUS DAILY
Qty: 90 ML | Refills: 1 | Status: SHIPPED | OUTPATIENT
Start: 2023-09-22

## 2023-09-22 NOTE — PATIENT INSTRUCTIONS
September is Prostate Cancer Awareness Month   Prostate cancer is the second most common cancer among men.   Screening generally starts at 55 years of age but if you have increased risk factors:   Have at least one first-degree relative (such as your father or brother) who has had prostate cancer  Have at least two extended family members who have had prostate cancer   Are -American, an ethnicity that has a higher risk of developing more aggressive cancers  Your screening could start at a younger age. Please ensure you discuss your risk factors.     https://www.cdc.gov/cancer/prostate/basic_info/index.htm          October is Breast Cancer Awareness Month  Each year more than 245,000 women get breast cancer in the [place].  Each year approximately 2,650 men are diagnosed with breast cancer.     Monitor your breast for changes  Any change in the size or the shape of the breast  Pain in any area of the breast  Nipple discharge other than breast milk (including blood)  A new lump in the breast or underarm  *Continue regular scheduled mammograms    Diet:    Eat vegetables, fruits, whole grain, low-fat dairy, poultry, fish, beans, nontropical vegetable oils, and nuts, but low amounts of red meat (i.e., Mediterranean-style diet, DASH [Dietary Approaches to Stop Hypertension] diet).  Limit sugary drinks and sweets.  Limit saturated and trans fat to 5% to 6% of calories.  Limit sodium intake to 2,400 mg daily (about one teaspoon table salt [kosher/sea salt have less sodium per teaspoon]).  Fad diets will come and go.  Studies show that the most effective diet is one that you can continue long term.     Weight loss / Calorie Counting Apps:    Lose It!   MyKapitall Pal   Works great when you try it with a partner/ friend    Exercise:   Engage in moderate-to-vigorous aerobic activity for at least 40 minutes (on average) three to four times each week.    Wearables:   Activity tracker   Step tracker: getting 7,500  steps daily can cut your cardiac risks by 44%     Bone Health:   Https://www.nof.org/patients/treatment/nutrition/  Routine weight bearing exercise    Vaccines:   Flu vaccine every fall  https://www.vaccinateyourfamily.org/        COVID booster recommended: newest booster is now available at local pharmacies.   You can take the booster 2 months after previous booster.    If you had recent COVID infection: in general, you may take the booster when feeling well and up to 3 months after infection.   COVID resources: https://govstatus.WHILL/xbayfvn48    Note: you may take COVID booster along with flu vaccine unless contraindicated.

## 2023-09-22 NOTE — PROGRESS NOTES
Chief Complaint  Hypertension     Subjective:      History of Present Illness {  Problem List  Visit  Diagnosis   Encounters  Notes  Medications  Labs  Result Review Imaging  Media :23}     Luis Quintana presents to [facility] for:      Hypertension  This is a chronic problem. The problem is unchanged. The problem is controlled. Pertinent negatives include no chest pain or peripheral edema. Risk factors for coronary artery disease include diabetes mellitus, dyslipidemia and male gender. Current antihypertension treatment includes ACE inhibitors. The current treatment provides significant improvement. There are no compliance problems.    Diabetes  He presents for his follow-up diabetic visit. He has type 1 diabetes mellitus. Pertinent negatives for diabetes include no chest pain, no polydipsia, no polyphagia and no polyuria. Current diabetic treatment includes insulin injections. He is following a diabetic diet. An ACE inhibitor/angiotensin II receptor blocker is being taken. Eye exam current: due.      States glucose generally 135    Erectile dysfunction: on going.    Prior treatment:  cialis  RF: diabetes     Continues to see  for depression/anxiety: discussed concern that they have been increasing his ativan.  States he doesn't notice improvement.      Due for PNA vaccine.   Due for follow up colonoscopy.     I have reviewed patient's medical history, any new submitted information provided by patient or medical assistant and updated medical record.      Objective:      Physical Exam  Vitals reviewed.   Constitutional:       Appearance: Normal appearance. He is well-developed.   Neck:      Thyroid: No thyromegaly.   Cardiovascular:      Rate and Rhythm: Normal rate and regular rhythm.      Pulses: Normal pulses.      Heart sounds: Normal heart sounds.   Pulmonary:      Effort: Pulmonary effort is normal.      Breath sounds: Normal breath sounds.      Comments:  "E/U   Abdominal:      General: Bowel sounds are normal.   Feet:      Right foot:      Skin integrity: Callus (great toe and heel) present.      Toenail Condition: Right toenails are normal.      Left foot:      Skin integrity: Callus (great toe and heel) present.      Toenail Condition: Left toenails are normal.      Comments: Diabetic Foot Exam Performed and Monofilament Test Performed     Skin:     General: Skin is warm and dry.      Capillary Refill: Capillary refill takes 2 to 3 seconds.   Neurological:      Mental Status: He is alert and oriented to person, place, and time.   Psychiatric:         Mood and Affect: Mood normal.         Behavior: Behavior normal. Behavior is cooperative.         Thought Content: Thought content normal.         Judgment: Judgment normal.      Result Review  Data Reviewed:{ Labs  Result Review  Imaging  Med Tab  Media :23}     The following data was reviewed by: Delia Gresham III, NP-C on 09/22/2023  Common labs          11/30/2022    11:12 3/16/2023    12:29 9/22/2023    15:26   Common Labs   Glucose 168  182     BUN 19  26     Creatinine 0.86  1.02     Sodium 136  136     Potassium 5.4  5.8     Chloride 101  101     Calcium 9.3  10.1     Total Protein 6.9  7.0     Albumin 4.70  4.8     Total Bilirubin 0.3  0.3     Alkaline Phosphatase 55  56     AST (SGOT) 22  17     ALT (SGPT) 15  18     WBC 5.03  5.76     Hemoglobin 14.0  13.6     Hematocrit 42.3  42.2     Platelets 269  269     Total Cholesterol  160     Triglycerides  117     HDL Cholesterol  61     LDL Cholesterol   78     Hemoglobin A1C 5.80  5.30  6.50    Microalbumin, Urine  3.6              Vital Signs:   /80 (BP Location: Left arm, Patient Position: Sitting, Cuff Size: Adult)   Pulse 103   Ht 188 cm (74\")   Wt 99.6 kg (219 lb 9.6 oz)   SpO2 99%   BMI 28.19 kg/m²         Requested Prescriptions     Signed Prescriptions Disp Refills    Continuous Blood Gluc Sensor (Dexcom G7 Sensor) misc       " Sig: Use 1 each Daily. Change every 10 days.    sildenafil (Viagra) 100 MG tablet 20 tablet 2     Sig: Take 1 tablet by mouth Daily As Needed for Erectile Dysfunction.    Insulin Lispro, 1 Unit Dial, (HumaLOG KwikPen) 100 UNIT/ML solution pen-injector 90 mL 1     Sig: Inject 38 Units under the skin into the appropriate area as directed Daily.       Routine medications provided by this office will also be refilled via pharmacy request.       Current Outpatient Medications:     ARIPiprazole (ABILIFY) 5 MG tablet, Take 1 tablet by mouth Daily., Disp: 30 tablet, Rfl: 2    buPROPion XL (WELLBUTRIN XL) 150 MG 24 hr tablet, Take 1 tablet by mouth Every Morning., Disp: 30 tablet, Rfl: 0    Continuous Blood Gluc Transmit (Dexcom G6 Transmitter) misc, 1 each Every 3 (Three) Months., Disp: 2 each, Rfl: 1    Insulin Glargine-yfgn 100 UNIT/ML solution pen-injector, Inject 38 Units under the skin into the appropriate area as directed Daily., Disp: 100 mL, Rfl: 0    Insulin Lispro, 1 Unit Dial, (HumaLOG KwikPen) 100 UNIT/ML solution pen-injector, Inject 38 Units under the skin into the appropriate area as directed Daily., Disp: 90 mL, Rfl: 1    Insulin Pen Needle (B-D ULTRAFINE III SHORT PEN) 31G X 8 MM misc, 1 each 4 (Four) Times a Day., Disp: 400 each, Rfl: 1    lisinopril (PRINIVIL,ZESTRIL) 40 MG tablet, Take 1 tablet by mouth Daily., Disp: 90 tablet, Rfl: 0    LORazepam (ATIVAN) 1 MG tablet, Take 1 tablet by mouth 4 (Four) Times a Day As Needed., Disp: 120 tablet, Rfl: 2    lurasidone (Latuda) 40 MG tablet tablet, Take 1 tablet by mouth Daily., Disp: 30 tablet, Rfl: 0    montelukast (SINGULAIR) 10 MG tablet, Take 1 tablet by mouth Every Night., Disp: 90 tablet, Rfl: 1    Multiple Vitamins-Minerals (MULTIVITAMIN WITH MINERALS) tablet tablet, Take 1 tablet by mouth Daily., Disp: , Rfl:     rosuvastatin (Crestor) 5 MG tablet, Take 1 tablet by mouth Daily., Disp: 90 tablet, Rfl: 1    traZODone (DESYREL) 150 MG tablet, Take 1  tablet by mouth Every Night., Disp: 30 tablet, Rfl: 0    Continuous Blood Gluc Sensor (Dexcom G7 Sensor) misc, Use 1 each Daily. Change every 10 days., Disp: , Rfl:     fexofenadine (Allegra Allergy) 180 MG tablet, Take 1 tablet by mouth Daily for 10 days., Disp: 10 tablet, Rfl: 0    sildenafil (Viagra) 100 MG tablet, Take 1 tablet by mouth Daily As Needed for Erectile Dysfunction., Disp: 20 tablet, Rfl: 2     Assessment and Plan:      Assessment and Plan {CC Problem List  Visit Diagnosis  ROS  Review (Popup)  Health Maintenance  Quality  BestPractice  Medications  SmartSets  SnapShot Encounters  Media :23}     Problem List Items Addressed This Visit          Allergies and Adverse Reactions    Allergies (Chronic)    Overview     Chronic  2 sinus surgeries in the past - recurrent sinus infections           Current Assessment & Plan     States allegra controlling now.             Cardiac and Vasculature    Mixed hyperlipidemia (Chronic)    Overview     Hx smoking         Essential hypertension - Primary (Chronic)    Current Assessment & Plan     Hypertension is improving with treatment.  Continue current treatment regimen.  Dietary sodium restriction.  Regular aerobic exercise.  Blood pressure will be reassessed at the next regular appointment.            Endocrine and Metabolic    Type 1 diabetes mellitus without complication (Chronic)    Overview     Dx:   He has a Dexcom G7   CGM since 2019    Renal protection: lisinopril          Current Assessment & Plan     Your last A1C (3 month average) =   Lab Results   Component Value Date    HGBA1C 6.50 (H) 09/22/2023      Your diabetes is Controlled.    The American Diabetes Association recommends an A1C of less than 7%.  A1C Average Levels Blood Sugar:   6%  126 mg/dL  7%  154 mg/dL  8%  183 mg/dL  9%  212 mg/dL  10%  240 mg/dL  11%  269 mg/dL  12%  298 mg/dL    Glucose goals for many adults with diabetes  Blood sugar before meals  mg/dL  Blood sugar 1-2  hours after the start of a meal Less than 180 mg/dL A1C Less than 7%    Eye Health:   You need a diabetic eye exam yearly.   Please have a copy of the note faxed to my office.   Fax: [phone]    Foot Health:   You need a diabetic foot exam yearly.   Check your feet routinely for any wounds.   You should always check your shoes for any debris that could cause a wound.       Will refer to new endocrine.          Relevant Medications    Insulin Lispro, 1 Unit Dial, (HumaLOG KwikPen) 100 UNIT/ML solution pen-injector    Other Relevant Orders    Hemoglobin A1c (Completed)       Mental Health    Depression with anxiety (Chronic)    Overview     Dr Riggs   Wellburtrin (at 300 mg caused diarrhea)   Also takes trazadone for insomnia   Prior treatment:  prozac     Lorazepam: Keely          Current Assessment & Plan     Advised to discussed with  for medication adjustment.           Other Visit Diagnoses       Erectile dysfunction due to arterial insufficiency        Relevant Medications    sildenafil (Viagra) 100 MG tablet    Colon cancer screening        Relevant Orders    Ambulatory Referral For Screening Colonoscopy    Need for pneumococcal vaccination        Relevant Orders    Pneumococcal Conjugate Vaccine 20-Valent All (Completed)            Follow Up {Instructions Charge Capture  Follow-up Communications :23}     Return in about 6 months (around 3/22/2024) for Annual physical.      Patient was given instructions and counseling regarding his condition or for health maintenance advice. Please see specific information pulled into the AVS if appropriate.    Carmen disclaimer:   Much of this encounter note is an electronic transcription/translation of spoken language to printed text. The electronic translation of spoken language may permit erroneous, or at times, nonsensical words or phrases to be inadvertently transcribed; Although I have reviewed the note for such errors, some may still exist.     Additional  Patient Counseling:       Patient Instructions       September is Prostate Cancer Awareness Month   Prostate cancer is the second most common cancer among men.   Screening generally starts at 55 years of age but if you have increased risk factors:   Have at least one first-degree relative (such as your father or brother) who has had prostate cancer  Have at least two extended family members who have had prostate cancer   Are -American, an ethnicity that has a higher risk of developing more aggressive cancers  Your screening could start at a younger age. Please ensure you discuss your risk factors.     https://www.cdc.gov/cancer/prostate/basic_info/index.htm          October is Breast Cancer Awareness Month  Each year more than 245,000 women get breast cancer in the [place].  Each year approximately 2,650 men are diagnosed with breast cancer.     Monitor your breast for changes  Any change in the size or the shape of the breast  Pain in any area of the breast  Nipple discharge other than breast milk (including blood)  A new lump in the breast or underarm  *Continue regular scheduled mammograms    Diet:    Eat vegetables, fruits, whole grain, low-fat dairy, poultry, fish, beans, nontropical vegetable oils, and nuts, but low amounts of red meat (i.e., Mediterranean-style diet, DASH [Dietary Approaches to Stop Hypertension] diet).  Limit sugary drinks and sweets.  Limit saturated and trans fat to 5% to 6% of calories.  Limit sodium intake to 2,400 mg daily (about one teaspoon table salt [kosher/sea salt have less sodium per teaspoon]).  Fad diets will come and go.  Studies show that the most effective diet is one that you can continue long term.     Weight loss / Calorie Counting Apps:    Lose It!   [employer] Pal   Works great when you try it with a partner/ friend    Exercise:   Engage in moderate-to-vigorous aerobic activity for at least 40 minutes (on average) three to four times each week.    Wearables:    Activity tracker   Step tracker: getting 7,500 steps daily can cut your cardiac risks by 44%     Bone Health:   [email or web address].nof.org/patients/treatment/[email or web address]  Routine weight bearing exercise    Vaccines:   Flu vaccine every fall  https://www.vaccinateyourfamily.org/        COVID booster recommended: newest booster is now available at local pharmacies.   You can take the booster 2 months after previous booster.    If you had recent COVID infection: in general, you may take the booster when feeling well and up to 3 months after infection.   COVID resources: https://govstatus.[employer]/jbhxrvp66    Note: you may take COVID booster along with flu vaccine unless contraindicated.

## 2023-09-23 LAB — HBA1C MFR BLD: 6.5 % (ref 4.8–5.6)

## 2023-09-25 PROBLEM — N52.1 ERECTILE DYSFUNCTION DUE TO DISEASES CLASSIFIED ELSEWHERE: Status: ACTIVE | Noted: 2023-09-25

## 2023-09-25 NOTE — ASSESSMENT & PLAN NOTE
The patient addressed desire in reduction in erectile function and wants to know about ED Rx.    He is reassured that erectile dysfunction is very common, often temporary.  He has higher RF due to diabetes.   This group of medication is usually effective and generally safe, but expensive and sometimes not covered by insurance.      When taking medication, it is NOT as effective if taken right after dinner.  You should take it before eating a meal (at least 15 minutes) or wait until about 2 hours after meal for optimal benefit.     Viagra: 100 mg once daily as needed.   Advised more effective on empty stomach.    Can be taken 30 mins to 4 hours before sexual activity. (1 hour before is most effective)     Serious side effects include: an erection lasting more than 4 hours (get medical help right away), sudden vision loss or change in hearing.  Stop medication and notify provider.     He does not have known heart disease and is not using nitrates (Rx contraindicated with nitrates and may result in death if taken together).      Brief sexual counseling is provided.  The proper use is discussed.      He will return for specific follow up of this problem as needed if symptoms persist or if he doesn't respond to this medication Rx.

## 2023-09-25 NOTE — ASSESSMENT & PLAN NOTE
Your last A1C (3 month average) =   Lab Results   Component Value Date    HGBA1C 6.50 (H) 09/22/2023      Your diabetes is Controlled.    The American Diabetes Association recommends an A1C of less than 7%.  A1C Average Levels Blood Sugar:   6%  126 mg/dL  7%  154 mg/dL  8%  183 mg/dL  9%  212 mg/dL  10%  240 mg/dL  11%  269 mg/dL  12%  298 mg/dL    Glucose goals for many adults with diabetes  Blood sugar before meals  mg/dL  Blood sugar 1-2 hours after the start of a meal Less than 180 mg/dL A1C Less than 7%    Eye Health:   You need a diabetic eye exam yearly.   Please have a copy of the note faxed to my office.   Fax: [phone]    Foot Health:   You need a diabetic foot exam yearly.   Check your feet routinely for any wounds.   You should always check your shoes for any debris that could cause a wound.       Will refer to new endocrine.

## 2023-09-27 ENCOUNTER — TELEPHONE (OUTPATIENT)
Dept: PHYSICAL THERAPY | Facility: CLINIC | Age: 53
End: 2023-09-27
Payer: COMMERCIAL

## 2023-09-27 NOTE — TELEPHONE ENCOUNTER
Caller: Luis Quintana    Relationship: Self         What was the call regarding: FEELING BETTER , CX REMAINDER OF APPT

## 2023-10-26 ENCOUNTER — TELEPHONE (OUTPATIENT)
Dept: INTERNAL MEDICINE | Facility: CLINIC | Age: 53
End: 2023-10-26
Payer: COMMERCIAL

## 2023-10-26 RX ORDER — AZITHROMYCIN 250 MG/1
TABLET, FILM COATED ORAL
Qty: 6 TABLET | Refills: 0 | Status: SHIPPED | OUTPATIENT
Start: 2023-10-26

## 2023-10-26 NOTE — TELEPHONE ENCOUNTER
UNABLE TO WARM TRANSFER     Caller: Luis Quintana    Relationship: Self    Best call back number: [phone]     What medication are you requesting: Z PACK     What are your current symptoms: GREEN DISCHARGE, SUGARS ARE UP AND SINUS PRESSURE     How long have you been experiencing symptoms: 4 DAYS     Have you had these symptoms before:    [x] Yes  [] No    Have you been treated for these symptoms before:   [x] Yes  [] No    If a prescription is needed, what is your preferred pharmacy and phone number:  [id number] - [place], [place] - [address] [address] AT [facility] [address]  - [phone]  - [phone] FX

## 2023-10-31 ENCOUNTER — TELEPHONE (OUTPATIENT)
Dept: INTERNAL MEDICINE | Facility: CLINIC | Age: 53
End: 2023-10-31
Payer: COMMERCIAL

## 2023-10-31 NOTE — TELEPHONE ENCOUNTER
Patient is feeling better and has yellow/green mucus with blood, pt finshed the zpak yesterday.     Please advise

## 2023-10-31 NOTE — TELEPHONE ENCOUNTER
Caller: Luis Quintana    Relationship: Self    Best call back number:     [phone] (Mobile)     What is the best time to reach you: ANYTIME, ASAP    Who are you requesting to speak with (clinical staff, provider,  specific staff member): CLINICAL STAFF/ MARLEY GUILLERMO    Do you know the name of the person who called: Luis Quintana    What was the call regarding: PATIENT STATES HE IMPROVED SOME BUT NOT ALL THE WAY AND HE STILL HAS NASAL DRAINAGE AN NOW THERE IS BLOOD IN THE DRAINAGE AND HE IS ASKING FOR ANOTHER ZPAK TO BE SENT TO THE PHARMACY ASAP, PLEASE ADVISE PATIENT IF THIS CAN BE CALLED INTO THE PHARMACY ASAP    Is it okay if the provider responds through [employer]: PLEASE ADVISE PATIENT REGARDING THIS ASAP

## 2023-10-31 NOTE — TELEPHONE ENCOUNTER
He would not need another z-pack now.    That medication has a long half life and even though he is not taking it, it is still in his system for 5 more days.     Needs to use nasal saline rinse.  No flonase if he is using.   If not better, needs to be seen.     CATHY

## 2023-11-03 ENCOUNTER — TELEMEDICINE (OUTPATIENT)
Dept: INTERNAL MEDICINE | Facility: CLINIC | Age: 53
End: 2023-11-03
Payer: COMMERCIAL

## 2023-11-03 DIAGNOSIS — J01.41 ACUTE RECURRENT PANSINUSITIS: Primary | ICD-10-CM

## 2023-11-03 DIAGNOSIS — E10.9 TYPE 1 DIABETES MELLITUS WITHOUT COMPLICATION: ICD-10-CM

## 2023-11-03 RX ORDER — AMOXICILLIN AND CLAVULANATE POTASSIUM 500; 125 MG/1; MG/1
1 TABLET, FILM COATED ORAL 3 TIMES DAILY
Qty: 30 TABLET | Refills: 0 | Status: SHIPPED | OUTPATIENT
Start: 2023-11-03 | End: 2023-11-13

## 2023-11-03 RX ORDER — INSULIN ASPART 100 [IU]/ML
15 INJECTION, SOLUTION INTRAVENOUS; SUBCUTANEOUS
Qty: 15 ML | Refills: 1 | Status: SHIPPED | OUTPATIENT
Start: 2023-11-03 | End: 2023-11-06 | Stop reason: SDUPTHER

## 2023-11-03 NOTE — PROGRESS NOTES
Name: Luis Quintana  :  1970  Provider: Delia Gresham III, NP-C     Subjective:      No chief complaint on file.       Luis Quintana is a 53 y.o. male and has scheduled an Video Visit.     Patient presents during the COVID-19 pandemic/federally declared [facility] of public health emergency.   This service was conducted via Twilio .     Recurrent sinus infections       Sinusitis  This is a recurrent problem. The current episode started 1 to 4 weeks ago. The problem has been waxing and waning since onset. There has been no fever. Associated symptoms include congestion and sinus pressure. Pertinent negatives include no shortness of breath. Past treatments include saline nose sprays and antibiotics. The treatment provided moderate relief.     Using saline rinses.   States glucose is improving.   He has a whole house humidifier.       Diabetes:    Glargine: 35-40 units a day    Lispro/ humalog: three times a day and as needed  (insurance no longer covering and now covers novolog)   In general takes 15 units 3 times a day.        Location:   Provider: in office  Patient: TeleLocation: home    I have reviewed the patient's medical history in detail and updated the computerized patient record.    Past Medical History:   Diagnosis Date    Allergic     Colon polyp     Depression     Diabetes mellitus     Erectile dysfunction     Hemorrhoids     Hyperlipidemia     Hypertension     Infectious viral hepatitis     Hep C       Past Surgical History:   Procedure Laterality Date    COLONOSCOPY W/ POLYPECTOMY N/A 2017    EH, polyps, tics, IH    ENDOSCOPY N/A 2017    LA Grade B reflux esophagitis, 2 cm HH, gastritis    SINUS SURGERY         Family History   Problem Relation Age of Onset    Arrhythmia Mother     Diabetes Maternal Grandfather     Cancer Maternal Grandfather        Social History     Socioeconomic History    Marital status: Single   Tobacco Use    Smoking status: Former      Packs/day: 0.50     Years: 30.00     Additional pack years: 0.00     Total pack years: 15.00     Types: Cigarettes     Start date: 1989     Quit date: 2014     Years since quittin.8    Smokeless tobacco: Never   Vaping Use    Vaping Use: Never used   Substance and Sexual Activity    Alcohol use: Yes     Alcohol/week: 2.0 standard drinks of alcohol     Types: 2 Cans of beer per week     Comment: occassional     Drug use: No    Sexual activity: Not Currently     Partners: Female     Birth control/protection: Natural family planning/Rhythm       Most Recent Immunizations   Administered Date(s) Administered    COVID-19 (PFIZER) BIVALENT 12+YRS 10/08/2022    COVID-19 (PFIZER) Purple Cap Monovalent 10/05/2021    Covid-19 (Pfizer) Gray Cap Monovalent 2022    Flu Vaccine Quad PF 6-35MO 2021    Fluzone (or Fluarix & Flulaval for VFC) >6mos 2022    Influenza Injectable Mdck Pf Quad 2020    Influenza, Unspecified 2020    Pneumococcal Conjugate 20-Valent (PCV20) 2023         Review of Systems:   Review of Systems   HENT:  Positive for congestion and sinus pressure.    Respiratory:  Negative for shortness of breath.          Objective:      Physical Exam:   NO Physical exam due to video visit.  On Visual and Verbal exam:    Constitution: NAD  Pulmonary: Unlabored   Psych: A&O, Calm       Vital Signs:  NO Office Vitals due to video visit.    Patient provided with home vitals which include:         Requested Prescriptions     Signed Prescriptions Disp Refills    amoxicillin-clavulanate (Augmentin) 500-125 MG per tablet 30 tablet 0     Sig: Take 1 tablet by mouth 3 (Three) Times a Day for 10 days.    insulin aspart (NovoLOG FlexPen) 100 UNIT/ML solution pen-injector sc pen 15 mL 1     Sig: Inject 15 Units under the skin into the appropriate area as directed 3 (Three) Times a Day With Meals.     Routine medications provided by this office will also be refilled via pharmacy request.        Current Outpatient Medications:     amoxicillin-clavulanate (Augmentin) 500-125 MG per tablet, Take 1 tablet by mouth 3 (Three) Times a Day for 10 days., Disp: 30 tablet, Rfl: 0    ARIPiprazole (ABILIFY) 5 MG tablet, Take 1 tablet by mouth Daily., Disp: 30 tablet, Rfl: 2    azithromycin (Zithromax Z-Rashawn) 250 MG tablet, Take 2 tablets by mouth on day 1, then 1 tablet daily on days 2-5, Disp: 6 tablet, Rfl: 0    buPROPion XL (WELLBUTRIN XL) 150 MG 24 hr tablet, Take 1 tablet by mouth Every Morning., Disp: 30 tablet, Rfl: 5    Continuous Blood Gluc Sensor (Dexcom G7 Sensor) misc, Use 1 each Daily. Change every 10 days., Disp: , Rfl:     Continuous Blood Gluc Transmit (Dexcom G6 Transmitter) misc, 1 each Every 3 (Three) Months., Disp: 2 each, Rfl: 1    fexofenadine (Allegra Allergy) 180 MG tablet, Take 1 tablet by mouth Daily for 10 days., Disp: 10 tablet, Rfl: 0    insulin aspart (NovoLOG FlexPen) 100 UNIT/ML solution pen-injector sc pen, Inject 15 Units under the skin into the appropriate area as directed 3 (Three) Times a Day With Meals., Disp: 15 mL, Rfl: 1    Insulin Glargine-yfgn 100 UNIT/ML solution pen-injector, Inject 38 Units under the skin into the appropriate area as directed Daily., Disp: 100 mL, Rfl: 0    Insulin Pen Needle (B-D ULTRAFINE III SHORT PEN) 31G X 8 MM misc, 1 each 4 (Four) Times a Day., Disp: 400 each, Rfl: 1    lisinopril (PRINIVIL,ZESTRIL) 40 MG tablet, Take 1 tablet by mouth Daily., Disp: 90 tablet, Rfl: 0    LORazepam (ATIVAN) 1 MG tablet, Take 1 tablet by mouth 4 (Four) Times a Day As Needed for anxiety., Disp: 120 tablet, Rfl: 2    lurasidone (Latuda) 40 MG tablet tablet, Take 1 tablet by mouth Daily., Disp: 30 tablet, Rfl: 5    montelukast (SINGULAIR) 10 MG tablet, Take 1 tablet by mouth Every Night., Disp: 90 tablet, Rfl: 1    Multiple Vitamins-Minerals (MULTIVITAMIN WITH MINERALS) tablet tablet, Take 1 tablet by mouth Daily., Disp: , Rfl:     rosuvastatin (Crestor) 5 MG tablet,  "Take 1 tablet by mouth Daily., Disp: 90 tablet, Rfl: 1    sildenafil (Viagra) 100 MG tablet, Take 1 tablet by mouth Daily As Needed for Erectile Dysfunction., Disp: 20 tablet, Rfl: 2    traZODone (DESYREL) 150 MG tablet, Take 1 tablet by mouth Every Night., Disp: 30 tablet, Rfl: 5       Assessment and Plan:      Based upon patient provided history and account of medical problem, I suspect the patient has:     Problems Addressed this Visit          Endocrine and Metabolic    Type 1 diabetes mellitus without complication (Chronic)    Relevant Medications    insulin aspart (NovoLOG FlexPen) 100 UNIT/ML solution pen-injector sc pen     Other Visit Diagnoses       Acute recurrent pansinusitis    -  Primary    Relevant Medications    amoxicillin-clavulanate (Augmentin) 500-125 MG per tablet          Diagnoses         Codes Comments    Acute recurrent pansinusitis    -  Primary ICD-10-CM: J01.41  ICD-9-CM: 461.8     Type 1 diabetes mellitus without complication     ICD-10-CM: E10.9  ICD-9-CM: 250.01              See \"patient instructions\" for portions of the plan for treatment.     Discussed any change in Rx and discussed visit with patient.  All questions answered.      Return if symptoms worsen or fail to improve.    Ceferino \"Steven\" Mp, APRN   11/03/23    Dragon disclaimer:   Much of this encounter note is an electronic transcription/translation of spoken language to printed text. The electronic translation of spoken language may permit erroneous, or at times, nonsensical words or phrases to be inadvertently transcribed; Although I have reviewed the note for such errors, some may still exist.     Additional Patient Counseling:       There are no Patient Instructions on file for this visit.    "

## 2023-11-06 DIAGNOSIS — E10.9 TYPE 1 DIABETES MELLITUS WITHOUT COMPLICATION: ICD-10-CM

## 2023-11-06 RX ORDER — INSULIN ASPART 100 [IU]/ML
15 INJECTION, SOLUTION INTRAVENOUS; SUBCUTANEOUS
Qty: 15 ML | Refills: 1 | Status: SHIPPED | OUTPATIENT
Start: 2023-11-06

## 2023-11-06 NOTE — TELEPHONE ENCOUNTER
Caller: Luis Quintana    Relationship: Self    Best call back number: [phone]     Requested Prescriptions:   Requested Prescriptions     Pending Prescriptions Disp Refills    insulin aspart (NovoLOG FlexPen) 100 UNIT/ML solution pen-injector sc pen 15 mL 1     Sig: Inject 15 Units under the skin into the appropriate area as directed 3 (Three) Times a Day With Meals.        Pharmacy where request should be sent: [facility]     Last office visit with prescribing clinician: 9/22/2023   Last telemedicine visit with prescribing clinician: 11/3/2023   Next office visit with prescribing clinician: 3/29/2024     Additional details provided by patient: PATIENT STATES THAT PRESCRIPTION WAS SENT TO THE WRONG PHARMACY, AND NEEDS TO BE SENT TO THE HOSPITAL PHARMACY INSTEAD. ALSO WANTED TO PASS ALONG TO [facility] THAT HE IS DOING MUCH BETTER    Does the patient have less than a 3 day supply:  [] Yes  [x] No    Would you like a call back once the refill request has been completed: [] Yes [x] No    If the office needs to give you a call back, can they leave a voicemail: [] Yes [x] No    Julius Becker Rep   11/06/23 13:09 EST

## 2023-11-06 NOTE — TELEPHONE ENCOUNTER
Rx Refill Note  Requested Prescriptions     Pending Prescriptions Disp Refills    insulin aspart (NovoLOG FlexPen) 100 UNIT/ML solution pen-injector sc pen 15 mL 1     Sig: Inject 15 Units under the skin into the appropriate area as directed 3 (Three) Times a Day With Meals.      Last office visit with prescribing clinician: 9/22/2023   Last telemedicine visit with prescribing clinician: 11/3/2023   Next office visit with prescribing clinician: 3/29/2024         Carrie Bobo MA  11/06/23, 13:51 EST

## 2023-11-14 DIAGNOSIS — I10 ESSENTIAL HYPERTENSION: Chronic | ICD-10-CM

## 2023-11-14 DIAGNOSIS — E10.9 TYPE 1 DIABETES MELLITUS WITHOUT COMPLICATION: ICD-10-CM

## 2023-11-15 RX ORDER — LISINOPRIL 40 MG/1
40 TABLET ORAL DAILY
Qty: 90 TABLET | Refills: 1 | Status: SHIPPED | OUTPATIENT
Start: 2023-11-15

## 2023-11-15 RX ORDER — INSULIN ASPART 100 [IU]/ML
15 INJECTION, SOLUTION INTRAVENOUS; SUBCUTANEOUS
Qty: 15 ML | Refills: 1 | Status: SHIPPED | OUTPATIENT
Start: 2023-11-15

## 2023-11-15 RX ORDER — INSULIN GLARGINE-YFGN 100 [IU]/ML
38 INJECTION, SOLUTION SUBCUTANEOUS DAILY
Qty: 100 ML | Refills: 0 | Status: CANCELLED | OUTPATIENT
Start: 2023-11-15

## 2023-11-21 DIAGNOSIS — E10.9 TYPE 1 DIABETES MELLITUS WITHOUT COMPLICATION: Chronic | ICD-10-CM

## 2023-11-21 NOTE — TELEPHONE ENCOUNTER
Rx Refill Note  Requested Prescriptions     Pending Prescriptions Disp Refills    insulin glargine (Semglee) 100 UNIT/ML injection 9 mL 1     Sig: Inject 35 Units under the skin into the appropriate area as directed Daily. Inject 38 Units Under the skin into the appropriate area as directed.      Last office visit with prescribing clinician: 9/22/2023   Last telemedicine visit with prescribing clinician: 11/3/2023   Next office visit with prescribing clinician: 3/29/2024         Carrie Bobo MA  11/21/23, 14:07 EST

## 2023-11-21 NOTE — TELEPHONE ENCOUNTER
Caller: Luis Quintana    Relationship: Self    Best call back number: [phone]  Requested Prescriptions:   Requested Prescriptions     Pending Prescriptions Disp Refills    insulin glargine (Semglee) 100 UNIT/ML injection 9 mL 1     Sig: Inject 35 Units under the skin into the appropriate area as directed Daily. Inject 38 Units Under the skin into the appropriate area as directed.        Pharmacy where request should be sent: [facility]     Last office visit with prescribing clinician: 9/22/2023   Last telemedicine visit with prescribing clinician: 11/3/2023   Next office visit with prescribing clinician: 3/29/2024     Additional details provided by patient: PATIENT IS REQUESTING A 90 DAY SUPPLY BECAUSE HE IS GOING [place] OF [place] ON A TRIP.       Does the patient have less than a 3 day supply:  [x] Yes  [] No    Would you like a call back once the refill request has been completed: [] Yes [x] No    If the office needs to give you a call back, can they leave a voicemail: [] Yes [x] No    Julius Willis Rep   11/21/23 11:50 EST

## 2023-11-22 RX ORDER — INSULIN GLARGINE-YFGN 100 [IU]/ML
38 INJECTION, SOLUTION SUBCUTANEOUS DAILY
Qty: 30 ML | Refills: 1 | Status: SHIPPED | OUTPATIENT
Start: 2023-11-22

## 2023-12-22 DIAGNOSIS — T78.40XD ALLERGY, SUBSEQUENT ENCOUNTER: ICD-10-CM

## 2023-12-22 RX ORDER — MONTELUKAST SODIUM 10 MG/1
10 TABLET ORAL NIGHTLY
Qty: 90 TABLET | Refills: 1 | Status: SHIPPED | OUTPATIENT
Start: 2023-12-22

## 2023-12-22 NOTE — TELEPHONE ENCOUNTER
Rx Refill Note  Requested Prescriptions     Pending Prescriptions Disp Refills    montelukast (SINGULAIR) 10 MG tablet 90 tablet 1     Sig: Take 1 tablet by mouth Every Night.      Last office visit with prescribing clinician: 9/22/2023   Last telemedicine visit with prescribing clinician: 11/3/2023   Next office visit with prescribing clinician: 3/29/2024         Carrie Bobo MA  12/22/23, 08:14 EST

## 2023-12-27 ENCOUNTER — TELEPHONE (OUTPATIENT)
Dept: INTERNAL MEDICINE | Facility: CLINIC | Age: 53
End: 2023-12-27
Payer: COMMERCIAL

## 2023-12-27 NOTE — TELEPHONE ENCOUNTER
Caller: Luis Quintana    Relationship: Self    Best call back number: [phone]     What was the call regarding: A FAX WAS SENT YESTERDAY FROM [employer] REQUESTING HIS Accur[employer] G7 SENSOR. WAS THIS RECEIVED? PLEASE ADVISE.

## 2023-12-29 DIAGNOSIS — E78.2 MIXED HYPERLIPIDEMIA: Chronic | ICD-10-CM

## 2023-12-29 DIAGNOSIS — N52.01 ERECTILE DYSFUNCTION DUE TO ARTERIAL INSUFFICIENCY: ICD-10-CM

## 2023-12-29 RX ORDER — ROSUVASTATIN CALCIUM 5 MG/1
5 TABLET, COATED ORAL DAILY
Qty: 90 TABLET | Refills: 1 | Status: SHIPPED | OUTPATIENT
Start: 2023-12-29

## 2023-12-29 RX ORDER — AZITHROMYCIN 250 MG/1
TABLET, FILM COATED ORAL
Qty: 6 TABLET | Refills: 0 | Status: SHIPPED | OUTPATIENT
Start: 2023-12-29

## 2023-12-29 RX ORDER — ACYCLOVIR 400 MG/1
1 TABLET ORAL DAILY
Start: 2023-12-29

## 2023-12-29 RX ORDER — SILDENAFIL 100 MG/1
100 TABLET, FILM COATED ORAL DAILY PRN
Qty: 20 TABLET | Refills: 2 | Status: SHIPPED | OUTPATIENT
Start: 2023-12-29

## 2023-12-29 NOTE — TELEPHONE ENCOUNTER
Patient called back today and he only has one sensor left. Once the paperwork is complete can someone please give him a phonecall. Thanks!

## 2023-12-29 NOTE — TELEPHONE ENCOUNTER
Patient went to [facility] and has came back sick and is wanting an antibotic sent to the pharmacy advised an appointment or UC

## 2023-12-29 NOTE — TELEPHONE ENCOUNTER
Caller: [email or web address]    Relationship: Self      [phone]    Requested Prescriptions:   Requested Prescriptions     Pending Prescriptions Disp Refills    Continuous Blood Gluc Sensor (Dexcom G7 Sensor) misc       Sig: Use 1 each Daily. Change every 10 days.        Pharmacy where request should be sent: [facility]     Last office visit with prescribing clinician: 9/22/2023   Last telemedicine visit with prescribing clinician: 11/3/2023   Next office visit with prescribing clinician: 3/29/2024     Additional details provided by patient: OUT OF SENSORS     Does the patient have less than a 3 day supply:  [x] Yes  [] No      Julius Coffman Rep   12/29/23 13:37 EST

## 2024-03-07 ENCOUNTER — TELEPHONE (OUTPATIENT)
Dept: INTERNAL MEDICINE | Facility: CLINIC | Age: 54
End: 2024-03-07
Payer: COMMERCIAL

## 2024-03-07 NOTE — TELEPHONE ENCOUNTER
Patient has been having some issues in the night, will get up in the middle of the night legs will be weak and will fall down.     During the day patient would be sitting infront of computer and will get dizzy and legs are weak.     Made patient an appointment to come in and be seen.

## 2024-03-08 ENCOUNTER — OFFICE VISIT (OUTPATIENT)
Dept: INTERNAL MEDICINE | Facility: CLINIC | Age: 54
End: 2024-03-08
Payer: COMMERCIAL

## 2024-03-08 VITALS
SYSTOLIC BLOOD PRESSURE: 130 MMHG | HEART RATE: 96 BPM | OXYGEN SATURATION: 98 % | DIASTOLIC BLOOD PRESSURE: 82 MMHG | BODY MASS INDEX: 26.82 KG/M2 | HEIGHT: 74 IN | WEIGHT: 209 LBS

## 2024-03-08 DIAGNOSIS — I95.9 HYPOTENSION, UNSPECIFIED HYPOTENSION TYPE: Primary | ICD-10-CM

## 2024-03-08 DIAGNOSIS — J01.11 ACUTE RECURRENT FRONTAL SINUSITIS: ICD-10-CM

## 2024-03-08 DIAGNOSIS — E10.9 TYPE 1 DIABETES MELLITUS WITHOUT COMPLICATION: ICD-10-CM

## 2024-03-08 DIAGNOSIS — I10 ESSENTIAL HYPERTENSION: Chronic | ICD-10-CM

## 2024-03-08 RX ORDER — INSULIN ASPART 100 [IU]/ML
15 INJECTION, SOLUTION INTRAVENOUS; SUBCUTANEOUS
Qty: 45 ML | Refills: 1 | Status: SHIPPED | OUTPATIENT
Start: 2024-03-08

## 2024-03-08 RX ORDER — LISINOPRIL 10 MG/1
10 TABLET ORAL DAILY
Qty: 30 TABLET | Refills: 0 | Status: SHIPPED | OUTPATIENT
Start: 2024-03-08

## 2024-03-08 RX ORDER — LEVOFLOXACIN 500 MG/1
1 TABLET, FILM COATED ORAL EVERY 12 HOURS SCHEDULED
COMMUNITY
Start: 2024-02-13 | End: 2024-03-08

## 2024-03-08 RX ORDER — AMOXICILLIN AND CLAVULANATE POTASSIUM 500; 125 MG/1; MG/1
1 TABLET, FILM COATED ORAL 3 TIMES DAILY
Qty: 30 TABLET | Refills: 0 | Status: SHIPPED | OUTPATIENT
Start: 2024-03-08 | End: 2024-03-18

## 2024-03-08 NOTE — PROGRESS NOTES
Chief Complaint  Dizziness (Patient presents here today for dizziness happens when patient stands feels numb all over body. )     Subjective:      History of Present Illness {CC  Problem List  Visit  Diagnosis   Encounters  Notes  Medications  Labs  Result Review Imaging  Media :23}     Luis Quintana presents to [facility] for:      Recurrent falls: states at times he will get up and then has to steady himself.  Going to [place] at night he has been in [place] and got dizzy and fallen.  Has not loss consciousness.    No CP, SOA, HA       Taking Trazodone 100 mg nightly.   Continues ativan 1 mg bid to tid,     Alcohol: last time was in [place] in December       Diabetes: does not feel was low at time time.   Needs insulin refill.     Lab Results   Component Value Date    HGBA1C 6.50 (H) 09/22/2023        ENT: recurrent sinus infection: needs augmentin refill today.   Adin         Answers submitted by the patient for this visit:  Primary Reason for Visit (Submitted on 3/7/2024)  What is the primary reason for your visit?: Other  Other (Submitted on 3/7/2024)  Please describe your symptoms.: Dizziness when standing up. Fainting, falling when standing up in the middle of the night.  Have you had these symptoms before?: Yes  How long have you been having these symptoms?: Greater than 2 weeks  Please list any medications you are currently taking for this condition.: None  Please describe any probable cause for these symptoms. : Orthostatic hypotension      I have reviewed patient's medical history, any new submitted information provided by patient or medical assistant and updated medical record.      Objective:      Physical Exam  Constitutional:       Appearance: Normal appearance.   HENT:      Nose:      Right Sinus: Frontal sinus tenderness present.      Left Sinus: Frontal sinus tenderness present.   Cardiovascular:      Rate and Rhythm: Normal rate and regular rhythm.       "Pulses: Normal pulses.      Heart sounds: Normal heart sounds. No murmur heard.  Pulmonary:      Effort: Pulmonary effort is normal.      Breath sounds: Normal breath sounds.   Musculoskeletal:      Right lower leg: No edema.      Left lower leg: No edema.   Neurological:      General: No focal deficit present.      Mental Status: He is oriented to person, place, and time.        Result Review  Data Reviewed:{ Labs  Result Review  Imaging  Med Tab  Media :23}     The following data was reviewed by: Delia Gresham III, NP-C on 03/08/2024  Common labs          9/22/2023    15:26   Common Labs   Hemoglobin A1C 6.50             Vital Signs:   /82 (BP Location: Left arm, Patient Position: Sitting, Cuff Size: Adult)   Pulse 96   Ht 188 cm (74\")   Wt 94.8 kg (209 lb)   SpO2 98%   BMI 26.83 kg/m²         Requested Prescriptions     Signed Prescriptions Disp Refills    lisinopril (PRINIVIL,ZESTRIL) 10 MG tablet 30 tablet 0     Sig: Take 1 tablet by mouth Daily.    amoxicillin-clavulanate (Augmentin) 500-125 MG per tablet 30 tablet 0     Sig: Take 1 tablet by mouth 3 (Three) Times a Day for 10 days.    insulin aspart (NovoLOG FlexPen) 100 UNIT/ML solution pen-injector sc pen 45 mL 1     Sig: Inject 15 Units under the skin into the appropriate area as directed 3 (Three) Times a Day With Meals.       Routine medications provided by this office will also be refilled via pharmacy request.       Current Outpatient Medications:     buPROPion XL (WELLBUTRIN XL) 150 MG 24 hr tablet, Take 1 tablet by mouth Every Morning., Disp: 30 tablet, Rfl: 5    Continuous Blood Gluc Sensor (Dexcom G7 Sensor) misc, Use 1 each Daily. Change every 10 days., Disp: , Rfl:     Continuous Blood Gluc Transmit (Dexcom G6 Transmitter) misc, 1 each Every 3 (Three) Months., Disp: 2 each, Rfl: 1    insulin aspart (NovoLOG FlexPen) 100 UNIT/ML solution pen-injector sc pen, Inject 15 Units under the skin into the appropriate area as " directed 3 (Three) Times a Day With Meals., Disp: 45 mL, Rfl: 1    Insulin Glargine-yfgn (Semglee, yfgn,) 100 UNIT/ML solution pen-injector, Inject 38 Units under the skin into the appropriate area as directed Daily., Disp: 30 mL, Rfl: 1    Insulin Glargine-yfgn 100 UNIT/ML solution pen-injector, Inject 38 Units under the skin into the appropriate area as directed Daily., Disp: 100 mL, Rfl: 0    Insulin Pen Needle (B-D ULTRAFINE III SHORT PEN) 31G X 8 MM misc, 1 each 4 (Four) Times a Day., Disp: 400 each, Rfl: 1    LORazepam (ATIVAN) 1 MG tablet, Take 1 tablet by mouth 4 (Four) Times a Day As Needed for anxiety., Disp: 120 tablet, Rfl: 2    lurasidone (Latuda) 40 MG tablet tablet, Take 1 tablet by mouth Daily., Disp: 30 tablet, Rfl: 5    montelukast (SINGULAIR) 10 MG tablet, Take 1 tablet by mouth Every Night., Disp: 90 tablet, Rfl: 1    Multiple Vitamins-Minerals (MULTIVITAMIN WITH MINERALS) tablet tablet, Take 1 tablet by mouth Daily., Disp: , Rfl:     rosuvastatin (Crestor) 5 MG tablet, Take 1 tablet by mouth Daily., Disp: 90 tablet, Rfl: 1    sildenafil (Viagra) 100 MG tablet, Take 1 tablet by mouth Daily As Needed for Erectile Dysfunction., Disp: 20 tablet, Rfl: 2    traZODone (DESYREL) 150 MG tablet, Take 1 tablet by mouth Every Night., Disp: 30 tablet, Rfl: 5    amoxicillin-clavulanate (Augmentin) 500-125 MG per tablet, Take 1 tablet by mouth 3 (Three) Times a Day for 10 days., Disp: 30 tablet, Rfl: 0    fexofenadine (Allegra Allergy) 180 MG tablet, Take 1 tablet by mouth Daily for 10 days., Disp: 10 tablet, Rfl: 0    lisinopril (PRINIVIL,ZESTRIL) 10 MG tablet, Take 1 tablet by mouth Daily., Disp: 30 tablet, Rfl: 0     Assessment and Plan:      Assessment and Plan {CC Problem List  Visit Diagnosis  ROS  Review (Popup)  Health Maintenance  Quality  BestPractice  Medications  SmartSets  SnapShot Encounters  Media :23}     Problem List Items Addressed This Visit          Cardiac and Vasculature     Essential hypertension (Chronic)    Current Assessment & Plan     Over treated ?   Will decrease lisinopril to 10 mg daily.     Return to clinic 2 weeks.          Relevant Medications    lisinopril (PRINIVIL,ZESTRIL) 10 MG tablet       Endocrine and Metabolic    Type 1 diabetes mellitus without complication (Chronic)    Overview     Dx:   He has a Dexcom G7   CGM since 2019    Renal protection: lisinopril          Relevant Medications    insulin aspart (NovoLOG FlexPen) 100 UNIT/ML solution pen-injector sc pen     Other Visit Diagnoses       Hypotension, unspecified hypotension type    -  Primary    Acute recurrent frontal sinusitis        Relevant Medications    amoxicillin-clavulanate (Augmentin) 500-125 MG per tablet          He is hypotensive. He has not been checking BP at home.  Will have him to decrease lisinopril to 10 mg daily.    He will check Bps at home and send me and update early next week and if improved - will have him back in 2 weeks.     I am also concerned it is with him taking ativan, trazodone. He is followed by .     Follow Up {Instructions Charge Capture  Follow-up Communications :23}     Return in about 2 weeks (around 3/22/2024).      Patient was given instructions and counseling regarding his condition or for health maintenance advice. Please see specific information pulled into the AVS if appropriate.    Dragon disclaimer:   Much of this encounter note is an electronic transcription/translation of spoken language to printed text. The electronic translation of spoken language may permit erroneous, or at times, nonsensical words or phrases to be inadvertently transcribed; Although I have reviewed the note for such errors, some may still exist.     Additional Patient Counseling:       There are no Patient Instructions on file for this visit.

## 2024-03-20 ENCOUNTER — LAB REQUISITION (OUTPATIENT)
Dept: LAB | Facility: HOSPITAL | Age: 54
End: 2024-03-20
Payer: COMMERCIAL

## 2024-03-20 DIAGNOSIS — J32.9 CHRONIC SINUSITIS, UNSPECIFIED: ICD-10-CM

## 2024-03-20 PROCEDURE — 87205 SMEAR GRAM STAIN: CPT | Performed by: OTOLARYNGOLOGY

## 2024-03-20 PROCEDURE — 87147 CULTURE TYPE IMMUNOLOGIC: CPT | Performed by: OTOLARYNGOLOGY

## 2024-03-20 PROCEDURE — 87102 FUNGUS ISOLATION CULTURE: CPT | Performed by: OTOLARYNGOLOGY

## 2024-03-20 PROCEDURE — 87075 CULTR BACTERIA EXCEPT BLOOD: CPT | Performed by: OTOLARYNGOLOGY

## 2024-03-20 PROCEDURE — 87077 CULTURE AEROBIC IDENTIFY: CPT | Performed by: OTOLARYNGOLOGY

## 2024-03-20 PROCEDURE — 87186 SC STD MICRODIL/AGAR DIL: CPT | Performed by: OTOLARYNGOLOGY

## 2024-03-20 PROCEDURE — 87070 CULTURE OTHR SPECIMN AEROBIC: CPT | Performed by: OTOLARYNGOLOGY

## 2024-03-23 LAB
BACTERIA SPEC AEROBE CULT: ABNORMAL
GRAM STN SPEC: ABNORMAL

## 2024-03-25 LAB
BACTERIA SPEC ANAEROBE CULT: NORMAL
BACTERIA SPEC ANAEROBE CULT: NORMAL

## 2024-03-25 NOTE — PROGRESS NOTES
Chief Complaint  Facial Laceration (Pt fell and hit chin doesn't remember it)     Subjective:      History of Present Illness {CC  Problem List  Visit  Diagnosis   Encounters  Notes  Medications  Labs  Result Review Imaging  Media :23}     Luis Quintana presents to [facility] for:        10/29: fall: states did not recall event.   Chair was moved. Location: Living room. Did not go to the ER.   No HA, dizziness.      He has CGM: states he thinks he looked back and glucose was low.     Laceration over right eye: (shown in picture on his phone) healed.   He had laceration under chin and feels it is not healing.  Feels like something under the skin. It is irritated when he shaves.    He states nothing was broken when he awoke from fall so there is no risk of having debris in chin. He states he has used a scar cream.   Questions if it could be opened and allowed to heal better.     He had been drinking more earlier in the month.   States he is no longer drinking.   Advised alcohol and his lorazepam (prescribed by psych)  is not a good mix.     Diabetes: uses dexcom.   He was seen by endocrine but was unable to keep follow up appointment due to his father's poor health.  His father had multiple hospital admissions, was in rehab and then passed recently.     30 day: glucose average 135     He needs a refill of his dexcom sensors: will have form faxed today by MA.     Answers for HPI/ROS submitted by the patient on 11/30/2022  What is the primary reason for your visit?: Other  Please describe your symptoms.: Scar that does not heal. Tiredness. Sleeping problems.  Have you had these symptoms before?: Yes  How long have you been having these symptoms?: Greater than 2 weeks  Please list any medications you are currently taking for this condition.: Scar gel          I have reviewed patient's medical history, any new submitted information provided by patient or medical assistant  "and updated medical record.      Objective:      Physical Exam  Vitals reviewed.   Constitutional:       Appearance: Normal appearance. He is well-developed.   HENT:      Head:      Comments: Wearing mask due to COVID   Neck:      Thyroid: No thyromegaly.   Cardiovascular:      Rate and Rhythm: Normal rate and regular rhythm.      Pulses: Normal pulses.      Heart sounds: Normal heart sounds.   Pulmonary:      Effort: Pulmonary effort is normal.      Breath sounds: Normal breath sounds.      Comments: E/U   Skin:     General: Skin is warm and dry.      Comments: Under chin: horizontal scar: approx 3 cm   NT, no drainage     Neurological:      Mental Status: He is alert and oriented to person, place, and time.   Psychiatric:         Mood and Affect: Mood normal.         Behavior: Behavior normal. Behavior is cooperative.         Thought Content: Thought content normal.         Judgment: Judgment normal.        Result Review  Data Reviewed:{ Labs  Result Review  Imaging  Med Tab  Media :23}                Vital Signs:   /82 (BP Location: Left arm, Patient Position: Sitting, Cuff Size: Adult)   Pulse 90   Temp 97.8 °F (36.6 °C) (Temporal)   Ht 188 cm (74\")   Wt 99.9 kg (220 lb 3.2 oz)   SpO2 98%   BMI 28.27 kg/m²         Requested Prescriptions      No prescriptions requested or ordered in this encounter       Routine medications provided by this office will also be refilled via pharmacy request.       Current Outpatient Medications:   •  buPROPion XL (WELLBUTRIN XL) 150 MG 24 hr tablet, Take 1 tablet by mouth Every Morning., Disp: 30 tablet, Rfl: 1  •  Continuous Blood Gluc Sensor (Dexcom G6 Sensor), Every 10 (Ten) Days., Disp: 9 each, Rfl: 1  •  Continuous Blood Gluc Transmit (Dexcom G6 Transmitter) misc, 1 each Every 3 (Three) Months., Disp: 2 each, Rfl: 1  •  fexofenadine (ALLEGRA) 60 MG tablet, Take 60 mg by mouth Daily., Disp: , Rfl:   •  FLUoxetine (PROzac) 40 MG capsule, Take 1 capsule by mouth " at bedtime., Disp: 30 capsule, Rfl: 1  •  fluticasone (FLONASE) 50 MCG/ACT nasal spray, 2 sprays into the nostril(s) as directed by provider Daily., Disp: , Rfl:   •  insulin glargine (Semglee) 100 UNIT/ML injection, Inject 35 Units under the skin into the appropriate area as directed Daily. Inject 38 Units Under the skin into the appropriate area as directed., Disp: 9 mL, Rfl: 1  •  Insulin Lispro, 1 Unit Dial, (HumaLOG KwikPen) 100 UNIT/ML solution pen-injector, Inject 38 Units Under the skin into the appropriate area as directed Daily, Disp: 90 mL, Rfl: 1  •  Insulin Pen Needle (B-D ULTRAFINE III SHORT PEN) 31G X 8 MM misc, 1 each 4 (Four) Times a Day., Disp: 400 each, Rfl: 1  •  lisinopril (PRINIVIL,ZESTRIL) 40 MG tablet, Take 1 tablet by mouth Daily., Disp: 90 tablet, Rfl: 0  •  LORazepam (ATIVAN) 1 MG tablet, take 1 tablet by mouth 3 times a day as needed anxiety, Disp: 90 tablet, Rfl: 1  •  Multiple Vitamins-Minerals (MULTIVITAMIN WITH MINERALS) tablet tablet, Take 1 tablet by mouth Daily., Disp: , Rfl:   •  traZODone (DESYREL) 100 MG tablet, Take 1.5 tablets by mouth Every Night., Disp: 45 tablet, Rfl: 1  •  LORazepam (ATIVAN) 1 MG tablet, take 1 tablet by mouth 3 times a day as needed anxiety, Disp: 90 tablet, Rfl: 1  •  LORazepam (ATIVAN) 1 MG tablet, Take 1 tablet by mouth 3 (Three) Times a Day As Needed., Disp: 90 tablet, Rfl: 1  •  LORazepam (ATIVAN) 1 MG tablet, Take 1 tablet by mouth 3 (Three) Times a Day As Needed for anxiety, Disp: 90 tablet, Rfl: 1  •  LORazepam (ATIVAN) 1 MG tablet, Take 1 tablet by mouth 4 (Four) Times a Day As Needed for anxiety, Disp: 120 tablet, Rfl: 1  •  montelukast (SINGULAIR) 10 MG tablet, Take 1 tablet by mouth Every Night., Disp: 90 tablet, Rfl: 0  •  rosuvastatin (Crestor) 5 MG tablet, Take 1 tablet by mouth Daily., Disp: 90 tablet, Rfl: 0     Assessment and Plan:      Assessment and Plan {CC Problem List  Visit Diagnosis  ROS  Review (Popup)  Health Maintenance   Quality  BestPractice  Medications  SmartSets  SnapShot Encounters  Media :23}     Problem List Items Addressed This Visit        Cardiac and Vasculature    Essential hypertension - Primary (Chronic)    Current Assessment & Plan     Hypertension is improving with treatment.  Continue current treatment regimen.  Blood pressure will be reassessed at the next regular appointment.         Relevant Orders    Comprehensive Metabolic Panel    CBC (No Diff)    Hemoglobin A1c       Endocrine and Metabolic    Type 1 diabetes mellitus without complication (HCC) (Chronic)    Overview     Dx:   He has a Dexcom G6 to monitor glucose since 2019  3 sensors per month and 1 transmitter every 3 months - gets 3 months at a time.   Renal protection: lisinopril          Current Assessment & Plan     Diabetes is stable. No hypoglycemic events since 10/29   Will check A1C  Refill dexacom sensors today.   He states he will follow up with endocrine.     Avoid alcohol.          Relevant Orders    Comprehensive Metabolic Panel    CBC (No Diff)    Hemoglobin A1c   Other Visit Diagnoses     Fall, initial encounter        ? hypoglycemic event versus medication/ etoh: no further issues, sx - he will notify me if he has any changes.     Laceration of skin of chin, initial encounter        Bump he feels seems like hair follice.   There is no sign of infection.   Advised to continue to massage the scar and he can consider plastic referral          Follow Up {Instructions Charge Capture  Follow-up Communications :23}     Return for Next scheduled follow up.      Patient was given instructions and counseling regarding his condition or for health maintenance advice. Please see specific information pulled into the AVS if appropriate.    Carmen disclaimer:   Much of this encounter note is an electronic transcription/translation of spoken language to printed text. The electronic translation of spoken language may permit erroneous, or at times,  nonsensical words or phrases to be inadvertently transcribed; Although I have reviewed the note for such errors, some may still exist.     Additional Patient Counseling:       There are no Patient Instructions on file for this visit.   For information on Fall & Injury Prevention, visit: [email or web address] OR  [email or web address] OR  https://www.cdc.gov/steadi/patient.html

## 2024-03-27 LAB
FUNGUS WND CULT: NORMAL
FUNGUS WND CULT: NORMAL

## 2024-04-02 LAB
FUNGUS WND CULT: ABNORMAL
FUNGUS WND CULT: ABNORMAL

## 2024-04-05 RX ORDER — LISINOPRIL 10 MG/1
10 TABLET ORAL DAILY
Qty: 90 TABLET | Refills: 1 | Status: SHIPPED | OUTPATIENT
Start: 2024-04-05

## 2024-04-05 NOTE — TELEPHONE ENCOUNTER
Rx Refill Note  Requested Prescriptions     Pending Prescriptions Disp Refills    lisinopril (PRINIVIL,ZESTRIL) 10 MG tablet 30 tablet 0     Sig: Take 1 tablet by mouth Daily.      Last office visit with prescribing clinician: 3/8/2024  Next office visit with prescribing clinician: Visit date not found         Carrie Bobo MA  04/05/24, 15:25 EDT

## 2024-04-10 DIAGNOSIS — E10.9 TYPE 1 DIABETES MELLITUS WITHOUT COMPLICATION: Chronic | ICD-10-CM

## 2024-04-11 RX ORDER — INSULIN GLARGINE-YFGN 100 [IU]/ML
38 INJECTION, SOLUTION SUBCUTANEOUS DAILY
Qty: 30 ML | Refills: 1 | Status: SHIPPED | OUTPATIENT
Start: 2024-04-11

## 2024-04-11 NOTE — TELEPHONE ENCOUNTER
Rx Refill Note  Requested Prescriptions     Pending Prescriptions Disp Refills    Insulin Glargine-yfgn (Semglee, yfgn,) 100 UNIT/ML solution pen-injector 30 mL 1     Sig: Inject 38 Units under the skin into the appropriate area as directed Daily.      Last office visit with prescribing clinician: 3/8/2024  Next office visit with prescribing clinician: Visit date not found         Carrie Bobo MA  04/11/24, 09:30 EDT

## 2024-04-21 LAB
FUNGUS WND CULT: ABNORMAL
FUNGUS WND CULT: ABNORMAL

## 2024-06-16 DIAGNOSIS — T78.40XD ALLERGY, SUBSEQUENT ENCOUNTER: ICD-10-CM

## 2024-06-17 RX ORDER — MONTELUKAST SODIUM 10 MG/1
10 TABLET ORAL NIGHTLY
Qty: 90 TABLET | Refills: 1 | Status: SHIPPED | OUTPATIENT
Start: 2024-06-17

## 2024-06-26 ENCOUNTER — OFFICE VISIT (OUTPATIENT)
Dept: INTERNAL MEDICINE | Facility: CLINIC | Age: 54
End: 2024-06-26
Payer: COMMERCIAL

## 2024-06-26 VITALS
WEIGHT: 212.2 LBS | BODY MASS INDEX: 27.23 KG/M2 | TEMPERATURE: 97.3 F | OXYGEN SATURATION: 98 % | HEIGHT: 74 IN | HEART RATE: 88 BPM | DIASTOLIC BLOOD PRESSURE: 82 MMHG | SYSTOLIC BLOOD PRESSURE: 118 MMHG

## 2024-06-26 DIAGNOSIS — E78.2 MIXED HYPERLIPIDEMIA: Chronic | ICD-10-CM

## 2024-06-26 DIAGNOSIS — Z23 NEED FOR SHINGLES VACCINE: ICD-10-CM

## 2024-06-26 DIAGNOSIS — F41.8 DEPRESSION WITH ANXIETY: Chronic | ICD-10-CM

## 2024-06-26 DIAGNOSIS — I10 ESSENTIAL HYPERTENSION: Chronic | ICD-10-CM

## 2024-06-26 DIAGNOSIS — Z12.11 SCREEN FOR COLON CANCER: ICD-10-CM

## 2024-06-26 DIAGNOSIS — T78.40XD ALLERGY, SUBSEQUENT ENCOUNTER: Chronic | ICD-10-CM

## 2024-06-26 DIAGNOSIS — Z00.00 ANNUAL PHYSICAL EXAM: Primary | ICD-10-CM

## 2024-06-26 DIAGNOSIS — Z23 NEED FOR DIPHTHERIA-TETANUS-PERTUSSIS (TDAP) VACCINE: ICD-10-CM

## 2024-06-26 DIAGNOSIS — Z12.5 PROSTATE CANCER SCREENING: ICD-10-CM

## 2024-06-26 DIAGNOSIS — Z79.4 LONG TERM CURRENT USE OF INSULIN: ICD-10-CM

## 2024-06-26 DIAGNOSIS — E10.9 TYPE 1 DIABETES MELLITUS WITHOUT COMPLICATION: Chronic | ICD-10-CM

## 2024-06-26 PROCEDURE — 90471 IMMUNIZATION ADMIN: CPT | Performed by: NURSE PRACTITIONER

## 2024-06-26 PROCEDURE — 90472 IMMUNIZATION ADMIN EACH ADD: CPT | Performed by: NURSE PRACTITIONER

## 2024-06-26 PROCEDURE — 90750 HZV VACC RECOMBINANT IM: CPT | Performed by: NURSE PRACTITIONER

## 2024-06-26 PROCEDURE — 99396 PREV VISIT EST AGE 40-64: CPT | Performed by: NURSE PRACTITIONER

## 2024-06-26 PROCEDURE — 90715 TDAP VACCINE 7 YRS/> IM: CPT | Performed by: NURSE PRACTITIONER

## 2024-06-26 NOTE — PROGRESS NOTES
Chief Complaint  Annual Exam     Subjective:      History of Present Illness {CC  Problem List  Visit  Diagnosis   Encounters  Notes  Medications  Labs  Result Review Imaging  Media :23}     Luis Quintana presents to [facility] for:  annual exam       Hypertension  This is a chronic problem. The problem is unchanged. The problem is controlled. Pertinent negatives include no chest pain or peripheral edema. Risk factors for coronary artery disease include diabetes mellitus, dyslipidemia and male gender. Current antihypertension treatment includes ACE inhibitors. The current treatment provides significant improvement. There are no compliance problems.    Since last visit: states no falls. Was occurring when he goes to stand up.  Decreased his lisinopril to 10 mg daily.     Diabetes  He presents for his follow-up diabetic visit. He has type 1 diabetes mellitus. Pertinent negatives for diabetes include no chest pain, no polydipsia, no polyphagia and no polyuria. Current diabetic treatment includes insulin injections. He is following a diabetic diet. An ACE inhibitor/angiotensin II receptor blocker is being taken. Eye exam current: due.      Worried about hair thinning: tried rogaine but stopped after 2 months of use.     He had sinus surgery in March: Adin  States much better. Able to go out for walks in park now without issues.     States continues to not drink alcohol.     Needs to schedule eye exam and colonoscopy.         Luis is here for coordination of medical care, to discuss health maintenance, disease prevention as well as to follow up on medical problems.     Patient Care Team:  Delia Gresham III, NP-C as PCP - General (Internal Medicine)  Juaquin Riggs MD (Psychiatry)  Lupillo Patino MD as Consulting Physician (Gastroenterology)  Afshin Mike MD (Inactive) as Consulting Physician (Otolaryngology)  Gary Oakley Jr., MD  "as Consulting Physician (Otolaryngology)      He states that his activity level is moderate.   Exercise:     His diet is diabetic.       Health and Weight:   Weight trend is   Wt Readings from Last 4 Encounters:   06/26/24 96.3 kg (212 lb 3.2 oz)   03/08/24 94.8 kg (209 lb)   09/22/23 99.6 kg (219 lb 9.6 oz)   09/01/23 93 kg (205 lb)              Mental Health Check:   Depression screen: PHQ-2 Total Score: 0     Blood Pressure:   BP Readings from Last 3 Encounters:   06/26/24 118/82   03/08/24 130/82   09/22/23 120/80        Cholesterol Screen:   Most men start screen at 35, if you have family history or comorbidities it may be screen earlier.     Risk Evaluation:  1. Cardiovascular risk factors: hypertension, hyperlipidemia, diabetes mellitus, male gender.  2. Diabetes risk factors: patient has diabetes and being treated.   3. Cancer risk factors: diabetes.    Prevention:   Cholesterol and glucose screen due.   Hepatitis  C screen: [] Due  [] Completed :     Colon Cancer Screen:   Due for 5 yr follow up colonoscopy: referral placed 9/22/23.   Didn't return form.,   Family history: [x] None    [] Yes:     Genital/ Urinary Health:   He voids without difficulty.      STI Screen:   [] HIV     [] Syphilis   [] Chlamydia/ Gonorrhea   [x] Declines STD screen  If tests ordered, patient was informed HIV results will not show up on my chart.     Testicular cancer Screen:   Testicular self exams recommended once a month.   Advised that any firm testicular nodules to be reported immediately.    Prostate cancer screening:    No results found for: \"PSA\"   Family history: [x] None    [] Yes:     Lung Cancer Screen:   [] Nonsmoker  [x] History of smoking  []     Tobacco Use: Medium Risk (6/28/2024)    Patient History     Smoking Tobacco Use: Former     Smokeless Tobacco Use: Never     Passive Exposure: Not on file          Vaccines Due:   [] Prevnar 20     [] Flu  [x] Shingrix (shingles: series of 2)   [x] Tdap   [] COVID " (booster)     [] Declines vaccines       Last eye exam:  due   Always where sunglass when outside with UV protection.     Skin Cancer:   Regular Sunsceen: Advised  Routine self assessment of your skin, report any changes.      I have reviewed patient's medical history, any new submitted information provided by patient or medical assistant and updated medical record.      Objective:      Physical Exam  Vitals reviewed.   Constitutional:       Appearance: He is well-developed.   HENT:      Head: Normocephalic and atraumatic.      Right Ear: External ear normal.      Left Ear: External ear normal.      Nose: Nose normal.   Eyes:      Conjunctiva/sclera: Conjunctivae normal.      Pupils: Pupils are equal, round, and reactive to light.   Neck:      Thyroid: No thyromegaly.      Vascular: No JVD.   Cardiovascular:      Rate and Rhythm: Normal rate and regular rhythm.      Pulses: Normal pulses.           Radial pulses are 2+ on the right side and 2+ on the left side.      Heart sounds: Normal heart sounds, S1 normal and S2 normal. No murmur heard.     No friction rub. No gallop.   Pulmonary:      Effort: Pulmonary effort is normal.      Breath sounds: Normal breath sounds.   Chest:      Chest wall: No deformity.   Abdominal:      General: Bowel sounds are normal.      Palpations: Abdomen is soft.      Tenderness: There is no abdominal tenderness. Negative signs include Leyva's sign.      Hernia: No hernia is present.   Musculoskeletal:      Cervical back: Normal range of motion and neck supple.      Right lower leg: No edema.      Left lower leg: No edema.   Lymphadenopathy:      Cervical: No cervical adenopathy.   Skin:     General: Skin is warm and dry.      Capillary Refill: Capillary refill takes 2 to 3 seconds.      Nails: There is no clubbing.   Neurological:      General: No focal deficit present.      Mental Status: He is alert and oriented to person, place, and time.      Cranial Nerves: No cranial nerve  "deficit.      Sensory: No sensory deficit.      Motor: Motor function is intact.   Psychiatric:         Mood and Affect: Mood normal.         Speech: Speech normal.         Behavior: Behavior normal. Behavior is cooperative.         Thought Content: Thought content normal.         Judgment: Judgment normal.        Result Review  Data Reviewed:{ Labs  Result Review  Imaging  Med Tab  Media :23}     The following data was reviewed by: Delia Gresham III, NP-C on 06/26/2024  Common labs          9/22/2023    15:26   Common Labs   Hemoglobin A1C 6.50             Vital Signs:   /82 (BP Location: Left arm, Patient Position: Sitting, Cuff Size: Adult)   Pulse 88   Temp 97.3 °F (36.3 °C)   Ht 188 cm (74\")   Wt 96.3 kg (212 lb 3.2 oz)   SpO2 98%   BMI 27.24 kg/m²   Estimated body mass index is 27.24 kg/m² as calculated from the following:    Height as of this encounter: 188 cm (74\").    Weight as of this encounter: 96.3 kg (212 lb 3.2 oz).        Requested Prescriptions      No prescriptions requested or ordered in this encounter       Routine medications provided by this office will also be refilled via pharmacy request.       Current Outpatient Medications:     buPROPion XL (WELLBUTRIN XL) 150 MG 24 hr tablet, Take 1 tablet by mouth Every Morning., Disp: 30 tablet, Rfl: 2    Continuous Blood Gluc Sensor (Dexcom G7 Sensor) misc, Use 1 each Daily. Change every 10 days., Disp: , Rfl:     Continuous Blood Gluc Transmit (Dexcom G6 Transmitter) misc, 1 each Every 3 (Three) Months., Disp: 2 each, Rfl: 1    insulin aspart (NovoLOG FlexPen) 100 UNIT/ML solution pen-injector sc pen, Inject 15 Units under the skin into the appropriate area as directed 3 (Three) Times a Day With Meals., Disp: 45 mL, Rfl: 1    Insulin Glargine-yfgn (Semglee, yfgn,) 100 UNIT/ML solution pen-injector, Inject 38 Units under the skin into the appropriate area as directed Daily., Disp: 30 mL, Rfl: 1    Insulin Glargine-yfgn 100 " UNIT/ML solution pen-injector, Inject 38 Units under the skin into the appropriate area as directed Daily., Disp: 100 mL, Rfl: 0    Insulin Pen Needle (B-D ULTRAFINE III SHORT PEN) 31G X 8 MM misc, 1 each 4 (Four) Times a Day., Disp: 400 each, Rfl: 1    lisinopril (PRINIVIL,ZESTRIL) 10 MG tablet, Take 1 tablet by mouth Daily., Disp: 90 tablet, Rfl: 1    LORazepam (ATIVAN) 1 MG tablet, Take 1 tablet by mouth 4 (Four) Times a Day As Needed for anxiety., Disp: 120 tablet, Rfl: 2    LORazepam (ATIVAN) 1 MG tablet, Take 1 tablet by mouth 4 (Four) Times a Day As Needed for anxiety, Disp: 120 tablet, Rfl: 2    lurasidone (Latuda) 40 MG tablet tablet, Take 1 tablet by mouth Daily., Disp: 30 tablet, Rfl: 0    lurasidone (LATUDA) 40 MG tablet tablet, Take 1 tablet by mouth Daily., Disp: 30 tablet, Rfl: 2    Multiple Vitamins-Minerals (MULTIVITAMIN WITH MINERALS) tablet tablet, Take 1 tablet by mouth Daily., Disp: , Rfl:     rosuvastatin (Crestor) 5 MG tablet, Take 1 tablet by mouth Daily., Disp: 90 tablet, Rfl: 1    sildenafil (Viagra) 100 MG tablet, Take 1 tablet by mouth Daily As Needed for Erectile Dysfunction., Disp: 20 tablet, Rfl: 2    traZODone (DESYREL) 150 MG tablet, Take 1 tablet by mouth Every Night., Disp: 30 tablet, Rfl: 5    fexofenadine (Allegra Allergy) 180 MG tablet, Take 1 tablet by mouth Daily for 10 days., Disp: 10 tablet, Rfl: 0     Assessment and Plan:      Assessment and Plan {CC Problem List  Visit Diagnosis  ROS  Review (Popup)  Health Maintenance  Quality  BestPractice  Medications  SmartSets  SnapShot Encounters  Media :23}     Diagnoses and all orders for this visit:    1. Annual physical exam (Primary)  -     Comprehensive Metabolic Panel; Future  -     Lipid Panel With LDL / HDL Ratio; Future  -     CBC (No Diff); Future    2. Essential hypertension  Assessment & Plan:  Hypertension is stable and controlled  Continue current treatment regimen.  Dietary sodium restriction.  Regular  aerobic exercise.  Blood pressure will be reassessed in 6 months.    Continue lisinopril 10 mg daily       3. Mixed hyperlipidemia  Overview:  Hx smoking    Assessment & Plan:  Lipid abnormalities are improving with treatment.  Pharmacotherapy as ordered.  Lipids will be reassessed in 6 months.    Lab Results   Component Value Date    CHLPL 160 03/16/2023    TRIG 117 03/16/2023    HDL 61 (H) 03/16/2023    LDL 78 03/16/2023       Orders:  -     Comprehensive Metabolic Panel; Future  -     Lipid Panel With LDL / HDL Ratio; Future  -     CBC (No Diff); Future    4. Type 1 diabetes mellitus without complication  Overview:  Dx:   He has a Dexcom G7   CGM since 2019    Renal protection: lisinopril     Assessment & Plan:  Your last A1C (3 month average) =   Lab Results   Component Value Date    HGBA1C 6.50 (H) 09/22/2023      Your diabetes is Controlled.    The American Diabetes Association recommends an A1C of less than 7%.  A1C Average Levels Blood Sugar:   6%  126 mg/dL  7%  154 mg/dL  8%  183 mg/dL  9%  212 mg/dL  10%  240 mg/dL  11%  269 mg/dL  12%  298 mg/dL    Glucose goals for many adults with diabetes  Blood sugar before meals  mg/dL  Blood sugar 1-2 hours after the start of a meal Less than 180 mg/dL A1C Less than 7%    Eye Health:   You need a diabetic eye exam yearly.   Please have a copy of the note faxed to my office.   Fax: [phone]    Foot Health:   You need a diabetic foot exam yearly.   Check your feet routinely for any wounds.   You should always check your shoes for any debris that could cause a wound.           Orders:  -     Hemoglobin A1c; Future  -     Microalbumin / Creatinine Urine Ratio - Urine, Clean Catch; Future  -     TSH Rfx On Abnormal To Free T4; Future    5. Depression with anxiety  Overview:  Dr Riggs   Wellburtrin (at 300 mg caused diarrhea)   Also takes trazadone for insomnia   Prior treatment:  prozac     Lorazepam: Gabodeir     Assessment & Plan:  States stable on current  regimen by .     I have advised him to discuss with provider to use less ativan.       6. Screen for colon cancer  -     Ambulatory Referral For Screening Colonoscopy    7. Allergy, subsequent encounter  Overview:  Chronic  2 sinus surgeries in the past - recurrent sinus infections  2024: sinus surgery: Dr Oakley      Assessment & Plan:  Discussed black box warning of singulair - will stop.   States allergist told him not much utility.       8. Prostate cancer screening  -     PSA SCREENING; Future    9. Need for diphtheria-tetanus-pertussis (Tdap) vaccine  -     Tdap Vaccine Greater Than or Equal To 8yo IM    10. Need for shingles vaccine  -     Shingrix Vaccine    11. Long term current use of insulin             No orders of the defined types were placed in this encounter.        Not fasting today: he will get labs fasting at [facility]  lab.     Follow Up {Instructions Charge Capture  Follow-up Communications :23}     Return in about 6 months (around 12/26/2024).      Patient was given instructions and counseling regarding his condition or for health maintenance advice. Please see specific information pulled into the AVS if appropriate.    Dragon disclaimer:   Much of this encounter note is an electronic transcription/translation of spoken language to printed text. The electronic translation of spoken language may permit erroneous, or at times, nonsensical words or phrases to be inadvertently transcribed; Although I have reviewed the note for such errors, some may still exist.     Additional Patient Counseling:       Patient Instructions     Summer Health Information:     Stay hydrated when outside  If your urine starts to get darker, you are not drinking enough     Protect yourself from ticks and mosquitos  Here are the best ingredients to look for:  DEET (N,N-diethyl-m-toluamide or N,N-diethyl-3-methyl-benzamide)  Picaridin  Oil of lemon eucalyptus (p-menthane-3,8-diol or PMD)  Wear  light-colored pants so you can spot ticks easier  If you use a permethrin product, ONLY apply to clothing    Practice Safe Sun!    Use sun screen SPF >30 daily, reapply regularly per directions on package  Apply 15 mins before going outside  If swimming, re-apply every 45 mins  See dermatologist for skin check regularly  Protect your eyes with sunglasses with UV protection    Poison Ivy  If you are going into areas that may have poison ivy, prepare with a product like IvyX or other ivy blocker.  Wash your clothes and pets after being in an area with ivy when returning home. If you come in contact with poison ivy, try a product like Technu     Other things you should incorporate all year...     Diet:    Eat vegetables, fruits, whole grain, low-fat dairy, poultry, fish, beans, nontropical vegetable oils, and nuts, but avoid red meat (i.e., Mediterranean-style diet, DASH [Dietary Approaches to Stop Hypertension] diet).  Limit sugary drinks and sweets.  Limit saturated and trans fat to 5% to 6% of calories.  Limit sodium intake to 2,400 mg daily (about one teaspoon table salt [kosher/sea salt have less sodium per teaspoon]).  https://www.eatright.org/    Weight loss / Calorie Counting Apps:    Lose It!   MyFitBabyoye Pal   Works great when you try it with a partner/ friend. It takes about 15 minutes a day but studies show that this simple method of monitoring your intake can help you achieve goals as it keeps you accountable.  I often will ask patients to try these apps just to get an idea of how much sodium and how many carbohydrates you are taking in.   Exercise:   Engage in moderate-to-vigorous aerobic activity for at least 40 minutes (on average) three to four times each week.  Wearables:   Activity tracker   Step tracker: getting 7,500 steps daily can cut your cardiac risks by 44%   Bone Health:   [email or web address]  Routine weight bearing exercise

## 2024-06-26 NOTE — ASSESSMENT & PLAN NOTE
Discussed black box warning of singulair - will stop.   States allergist told him not much utility.

## 2024-06-28 PROBLEM — E16.2 HYPOGLYCEMIA: Status: RESOLVED | Noted: 2017-03-31 | Resolved: 2024-06-28

## 2024-06-28 PROBLEM — L64.9 MALE PATTERN BALDNESS: Status: ACTIVE | Noted: 2024-06-28

## 2024-06-28 NOTE — PATIENT INSTRUCTIONS
Summer Health Information:     Stay hydrated when outside  If your urine starts to get darker, you are not drinking enough     Protect yourself from ticks and mosquitos  Here are the best ingredients to look for:  DEET (N,N-diethyl-m-toluamide or N,N-diethyl-3-methyl-benzamide)  Picaridin  Oil of lemon eucalyptus (p-menthane-3,8-diol or PMD)  Wear light-colored pants so you can spot ticks easier  If you use a permethrin product, ONLY apply to clothing    Practice Safe Sun!    Use sun screen SPF >30 daily, reapply regularly per directions on package  Apply 15 mins before going outside  If swimming, re-apply every 45 mins  See dermatologist for skin check regularly  Protect your eyes with sunglasses with UV protection    Poison Ivy  If you are going into areas that may have poison ivy, prepare with a product like IvyX or other ivy blocker.  Wash your clothes and pets after being in an area with ivy when returning home. If you come in contact with poison ivy, try a product like Technu     Other things you should incorporate all year...     Diet:    Eat vegetables, fruits, whole grain, low-fat dairy, poultry, fish, beans, nontropical vegetable oils, and nuts, but avoid red meat (i.e., Mediterranean-style diet, DASH [Dietary Approaches to Stop Hypertension] diet).  Limit sugary drinks and sweets.  Limit saturated and trans fat to 5% to 6% of calories.  Limit sodium intake to 2,400 mg daily (about one teaspoon table salt [kosher/sea salt have less sodium per teaspoon]).  https://www.eatright.org/    Weight loss / Calorie Counting Apps:    Lose It!   Wishdates Pal   Works great when you try it with a partner/ friend. It takes about 15 minutes a day but studies show that this simple method of monitoring your intake can help you achieve goals as it keeps you accountable.  I often will ask patients to try these apps just to get an idea of how much sodium and how many carbohydrates you are taking in.   Exercise:   Engage in  moderate-to-vigorous aerobic activity for at least 40 minutes (on average) three to four times each week.  Wearables:   Activity tracker   Step tracker: getting 7,500 steps daily can cut your cardiac risks by 44%   Bone Health:   [email or web address]  Routine weight bearing exercise

## 2024-06-28 NOTE — ASSESSMENT & PLAN NOTE
Your last A1C (3 month average) =   Lab Results   Component Value Date    HGBA1C 6.50 (H) 09/22/2023      Your diabetes is Controlled.    The American Diabetes Association recommends an A1C of less than 7%.  A1C Average Levels Blood Sugar:   6%  126 mg/dL  7%  154 mg/dL  8%  183 mg/dL  9%  212 mg/dL  10%  240 mg/dL  11%  269 mg/dL  12%  298 mg/dL    Glucose goals for many adults with diabetes  Blood sugar before meals  mg/dL  Blood sugar 1-2 hours after the start of a meal Less than 180 mg/dL A1C Less than 7%    Eye Health:   You need a diabetic eye exam yearly.   Please have a copy of the note faxed to my office.   Fax: [phone]    Foot Health:   You need a diabetic foot exam yearly.   Check your feet routinely for any wounds.   You should always check your shoes for any debris that could cause a wound.

## 2024-06-28 NOTE — ASSESSMENT & PLAN NOTE
States stable on current regimen by BH.     I have advised him to discuss with provider to use less ativan.

## 2024-06-28 NOTE — ASSESSMENT & PLAN NOTE
Hypertension is stable and controlled  Continue current treatment regimen.  Dietary sodium restriction.  Regular aerobic exercise.  Blood pressure will be reassessed in 6 months.    Continue lisinopril 10 mg daily

## 2024-06-28 NOTE — ASSESSMENT & PLAN NOTE
Advised topical rogaine twice a day: has to be consistent twice a day for at least 6-8 months to see results and discussed that is would be primarily to maintain hair

## 2024-06-28 NOTE — ASSESSMENT & PLAN NOTE
Lipid abnormalities are improving with treatment.  Pharmacotherapy as ordered.  Lipids will be reassessed in 6 months.    Lab Results   Component Value Date    CHLPL 160 03/16/2023    TRIG 117 03/16/2023    HDL 61 (H) 03/16/2023    LDL 78 03/16/2023

## 2024-07-05 ENCOUNTER — LAB (OUTPATIENT)
Dept: LAB | Facility: HOSPITAL | Age: 54
End: 2024-07-05
Payer: COMMERCIAL

## 2024-07-05 DIAGNOSIS — Z00.00 ANNUAL PHYSICAL EXAM: ICD-10-CM

## 2024-07-05 DIAGNOSIS — E78.2 MIXED HYPERLIPIDEMIA: Chronic | ICD-10-CM

## 2024-07-05 DIAGNOSIS — E10.9 TYPE 1 DIABETES MELLITUS WITHOUT COMPLICATION: Chronic | ICD-10-CM

## 2024-07-05 DIAGNOSIS — Z12.5 PROSTATE CANCER SCREENING: ICD-10-CM

## 2024-07-05 LAB
ALBUMIN SERPL-MCNC: 4.5 G/DL (ref 3.5–5.2)
ALBUMIN UR-MCNC: 1.6 MG/DL
ALBUMIN/GLOB SERPL: 1.6 G/DL
ALP SERPL-CCNC: 73 U/L (ref 39–117)
ALT SERPL W P-5'-P-CCNC: 18 U/L (ref 1–41)
ANION GAP SERPL CALCULATED.3IONS-SCNC: 11.9 MMOL/L (ref 5–15)
AST SERPL-CCNC: 22 U/L (ref 1–40)
BILIRUB SERPL-MCNC: 0.4 MG/DL (ref 0–1.2)
BUN SERPL-MCNC: 18 MG/DL (ref 6–20)
BUN/CREAT SERPL: 17.6 (ref 7–25)
CALCIUM SPEC-SCNC: 9.6 MG/DL (ref 8.6–10.5)
CHLORIDE SERPL-SCNC: 101 MMOL/L (ref 98–107)
CHOLEST SERPL-MCNC: 154 MG/DL (ref 0–200)
CO2 SERPL-SCNC: 25.1 MMOL/L (ref 22–29)
CREAT SERPL-MCNC: 1.02 MG/DL (ref 0.76–1.27)
CREAT UR-MCNC: 182.4 MG/DL
DEPRECATED RDW RBC AUTO: 41.6 FL (ref 37–54)
EGFRCR SERPLBLD CKD-EPI 2021: 89 ML/MIN/1.73
ERYTHROCYTE [DISTWIDTH] IN BLOOD BY AUTOMATED COUNT: 11.9 % (ref 12.3–15.4)
GLOBULIN UR ELPH-MCNC: 2.9 GM/DL
GLUCOSE SERPL-MCNC: 126 MG/DL (ref 65–99)
HBA1C MFR BLD: 6 % (ref 4.8–5.6)
HCT VFR BLD AUTO: 45.3 % (ref 37.5–51)
HDLC SERPL-MCNC: 58 MG/DL (ref 40–60)
HGB BLD-MCNC: 14.8 G/DL (ref 13–17.7)
LDLC SERPL CALC-MCNC: 81 MG/DL (ref 0–100)
LDLC/HDLC SERPL: 1.38 {RATIO}
MCH RBC QN AUTO: 31 PG (ref 26.6–33)
MCHC RBC AUTO-ENTMCNC: 32.7 G/DL (ref 31.5–35.7)
MCV RBC AUTO: 95 FL (ref 79–97)
MICROALBUMIN/CREAT UR: 8.8 MG/G (ref 0–29)
PLATELET # BLD AUTO: 276 10*3/MM3 (ref 140–450)
PMV BLD AUTO: 8.8 FL (ref 6–12)
POTASSIUM SERPL-SCNC: 5 MMOL/L (ref 3.5–5.2)
PROT SERPL-MCNC: 7.4 G/DL (ref 6–8.5)
PSA SERPL-MCNC: 0.24 NG/ML (ref 0–4)
RBC # BLD AUTO: 4.77 10*6/MM3 (ref 4.14–5.8)
SODIUM SERPL-SCNC: 138 MMOL/L (ref 136–145)
TRIGL SERPL-MCNC: 80 MG/DL (ref 0–150)
TSH SERPL DL<=0.05 MIU/L-ACNC: 2.21 UIU/ML (ref 0.27–4.2)
VLDLC SERPL-MCNC: 15 MG/DL (ref 5–40)
WBC NRBC COR # BLD AUTO: 7.23 10*3/MM3 (ref 3.4–10.8)

## 2024-07-05 PROCEDURE — G0103 PSA SCREENING: HCPCS

## 2024-07-05 PROCEDURE — 83036 HEMOGLOBIN GLYCOSYLATED A1C: CPT

## 2024-07-05 PROCEDURE — 80061 LIPID PANEL: CPT

## 2024-07-05 PROCEDURE — 82043 UR ALBUMIN QUANTITATIVE: CPT

## 2024-07-05 PROCEDURE — 36415 COLL VENOUS BLD VENIPUNCTURE: CPT

## 2024-07-05 PROCEDURE — 80050 GENERAL HEALTH PANEL: CPT

## 2024-07-05 PROCEDURE — 82570 ASSAY OF URINE CREATININE: CPT

## 2024-07-15 ENCOUNTER — TELEPHONE (OUTPATIENT)
Dept: GASTROENTEROLOGY | Facility: CLINIC | Age: 54
End: 2024-07-15
Payer: COMMERCIAL

## 2024-07-15 NOTE — TELEPHONE ENCOUNTER
LAST C/S  4/27/17   IN EPIC     PERSONAL HX OF POLYPS     FAMILY HX OF POLYPS     FAMILY HX OF COLON CA            LIST OF  MEDICATIONS  INSULIN  BUPROPION  LURASIDON  LORAZAPAM  ALLEGRA  ASTEPRO NASAL SPRAY  ROSUVASTATIN  LISINOPRIL  MONTELUKAST  B12  FOLATE  D3  B12  CENTRUM  TRAZODONE  DUPIXENT      OA QUESTIONNAIRE SCANNED IN MEDIA

## 2024-07-18 DIAGNOSIS — K63.5 POLYP OF COLON, UNSPECIFIED PART OF COLON, UNSPECIFIED TYPE: Primary | ICD-10-CM

## 2024-07-18 RX ORDER — SODIUM CHLORIDE, SODIUM LACTATE, POTASSIUM CHLORIDE, CALCIUM CHLORIDE 600; 310; 30; 20 MG/100ML; MG/100ML; MG/100ML; MG/100ML
30 INJECTION, SOLUTION INTRAVENOUS CONTINUOUS
OUTPATIENT
Start: 2024-07-18

## 2024-07-23 ENCOUNTER — TELEPHONE (OUTPATIENT)
Dept: GASTROENTEROLOGY | Facility: CLINIC | Age: 54
End: 2024-07-23
Payer: COMMERCIAL

## 2024-07-23 PROBLEM — K63.5 POLYP OF COLON: Status: ACTIVE | Noted: 2024-07-18

## 2024-07-23 NOTE — TELEPHONE ENCOUNTER
SERGEI Cadet for COLONOSCOPY on 10/31/2024  arrive at 12:30  . Sent prep instructions to pt my chart....miralax

## 2024-08-05 DIAGNOSIS — E78.2 MIXED HYPERLIPIDEMIA: Chronic | ICD-10-CM

## 2024-08-05 RX ORDER — ROSUVASTATIN CALCIUM 5 MG/1
5 TABLET, COATED ORAL DAILY
Qty: 90 TABLET | Refills: 1 | Status: SHIPPED | OUTPATIENT
Start: 2024-08-05

## 2024-10-02 DIAGNOSIS — E10.9 TYPE 1 DIABETES MELLITUS WITHOUT COMPLICATION: ICD-10-CM

## 2024-10-02 NOTE — TELEPHONE ENCOUNTER
Rx Refill Note  Requested Prescriptions     Pending Prescriptions Disp Refills    insulin aspart (NovoLOG FlexPen) 100 UNIT/ML solution pen-injector sc pen 45 mL 1     Sig: Inject 15 Units under the skin into the appropriate area as directed 3 (Three) Times a Day With Meals.    Insulin Glargine-yfgn (Semglee, yfgn,) 100 UNIT/ML solution pen-injector 30 mL 1     Sig: Inject 38 Units under the skin into the appropriate area as directed Daily.    lisinopril (PRINIVIL,ZESTRIL) 10 MG tablet 90 tablet 1     Sig: Take 1 tablet by mouth Daily.      Last office visit with prescribing clinician: 6/26/2024  Next office visit with prescribing clinician: Visit date not found         Carrie Bobo MA  10/02/24, 15:54 EDT

## 2024-10-02 NOTE — TELEPHONE ENCOUNTER
Caller: Luis Quintana    Relationship: Self    Best call back number: [phone]     Requested Prescriptions:   Requested Prescriptions     Pending Prescriptions Disp Refills    insulin aspart (NovoLOG FlexPen) 100 UNIT/ML solution pen-injector sc pen 45 mL 1     Sig: Inject 15 Units under the skin into the appropriate area as directed 3 (Three) Times a Day With Meals.    Insulin Glargine-yfgn (Semglee, yfgn,) 100 UNIT/ML solution pen-injector 30 mL 1     Sig: Inject 38 Units under the skin into the appropriate area as directed Daily.    lisinopril (PRINIVIL,ZESTRIL) 10 MG tablet 90 tablet 1     Sig: Take 1 tablet by mouth Daily.        Pharmacy where request should be sent: [facility]     Last office visit with prescribing clinician: 6/26/2024   Last telemedicine visit with prescribing clinician: Visit date not found   Next office visit with prescribing clinician: Visit date not found     Additional details provided by patient:     Does the patient have less than a 3 day supply:  [] Yes  [x] No    Would you like a call back once the refill request has been completed: [] Yes [] No    If the office needs to give you a call back, can they leave a voicemail: [] Yes [] No    Julius Allen Rep   10/02/24 13:27 EDT

## 2024-10-03 RX ORDER — INSULIN GLARGINE-YFGN 100 [IU]/ML
38 INJECTION, SOLUTION SUBCUTANEOUS DAILY
Qty: 30 ML | Refills: 1 | Status: SHIPPED | OUTPATIENT
Start: 2024-10-03

## 2024-10-03 RX ORDER — INSULIN ASPART 100 [IU]/ML
15 INJECTION, SOLUTION INTRAVENOUS; SUBCUTANEOUS
Qty: 45 ML | Refills: 1 | Status: SHIPPED | OUTPATIENT
Start: 2024-10-03

## 2024-10-03 RX ORDER — LISINOPRIL 10 MG/1
10 TABLET ORAL DAILY
Qty: 90 TABLET | Refills: 1 | Status: SHIPPED | OUTPATIENT
Start: 2024-10-03

## 2024-10-14 ENCOUNTER — SPECIALTY PHARMACY (OUTPATIENT)
Dept: PHARMACY | Facility: TELEHEALTH | Age: 54
End: 2024-10-14
Payer: COMMERCIAL

## 2024-10-14 PROBLEM — J33.0 POLYP OF NASAL CAVITY: Status: ACTIVE | Noted: 2024-10-14

## 2024-10-14 PROBLEM — J30.2 SEASONAL ALLERGIC RHINITIS: Status: ACTIVE | Noted: 2024-10-14

## 2024-10-14 NOTE — PROGRESS NOTES
Specialty Pharmacy Patient Management Program  Initial Assessment     Luis Quintana is a 54 y.o. male with nasal polyps and enrolled in the Patient Management program offered by [facility]. An initial outreach was conducted, including assessment of therapy appropriateness and specialty medication education for Dupixent 300mg/2ml. The patient was introduced to services offered by [facility], including: regular assessments, refill coordination, curbside pick-up or mail order delivery options, prior authorization maintenance, and financial assistance programs as applicable. The patient was also provided with contact information for the pharmacy team.     Insurance Coverage & Financial Support  none     Relevant Past Medical History and Comorbidities  Relevant medical history and concomitant health conditions were discussed with the patient. The patient's chart has been reviewed for relevant past medical history and comorbid health conditions and updated as necessary.   Past Medical History:   Diagnosis Date    Allergic     Anxiety     Colon polyp     Depression     Diabetes mellitus     Erectile dysfunction     Hemorrhoids     Hyperlipidemia     Hypertension     Infectious viral hepatitis     Hep C     Social History     Socioeconomic History    Marital status: Single   Tobacco Use    Smoking status: Former     Current packs/day: 0.00     Average packs/day: 0.5 packs/day for 30.0 years (15.0 ttl pk-yrs)     Types: Cigarettes     Start date: 1/1/1989     Quit date: 1/1/2014     Years since quitting: 10.7    Smokeless tobacco: Never   Vaping Use    Vaping status: Never Used   Substance and Sexual Activity    Alcohol use: Yes     Alcohol/week: 2.0 standard drinks of alcohol     Types: 2 Cans of beer per week     Comment: occassional     Drug use: No    Sexual activity: Not Currently     Partners: Female     Birth control/protection: Natural family planning/Rhythm      Problem list reviewed by Angelo Kirk RPH on 10/14/2024 at 10:24 AM    Allergies  Known allergies and reactions were discussed with the patient. The patient's chart has been reviewed for allergy information and updated as necessary.   Penicillins and Sulfa antibiotics  Allergies reviewed by Angelo Kirk RPH on 10/14/2024 at 10:24 AM  Allergies reviewed by Angelo Kirk RPH on 10/14/2024 at 10:24 AM    Current Medication List  This medication list has been reviewed with the patient and evaluated for any interactions or necessary modifications/recommendations, and updated to include all prescription medications, OTC medications, and supplements the patient is currently taking. This list reflects what is contained in the patient's profile, which has also been marked as reviewed to communicate to other providers it is the most up to date version of the patient's current medication therapy.     Current Outpatient Medications:     buPROPion XL (WELLBUTRIN XL) 150 MG 24 hr tablet, Take 1 tablet by mouth Every Morning., Disp: 30 tablet, Rfl: 0    buPROPion XL (WELLBUTRIN XL) 300 MG 24 hr tablet, Take 1 tablet by mouth Every Morning., Disp: 30 tablet, Rfl: 2    Continuous Blood Gluc Sensor (Dexcom G7 Sensor) misc, Use 1 each Daily. Change every 10 days., Disp: , Rfl:     Continuous Blood Gluc Transmit (Dexcom G6 Transmitter) misc, 1 each Every 3 (Three) Months., Disp: 2 each, Rfl: 1    Dupilumab (Dupixent) 300 MG/2ML solution pen-injector, Inject 2 mL under the skin into the appropriate area as directed Every 14 (Fourteen) Days., Disp: 4 mL, Rfl: 11    fexofenadine (Allegra Allergy) 180 MG tablet, Take 1 tablet by mouth Daily for 10 days., Disp: 10 tablet, Rfl: 0    insulin aspart (NovoLOG FlexPen) 100 UNIT/ML solution pen-injector sc pen, Inject 15 Units under the skin into the appropriate area as directed 3 (Three) Times a Day With Meals., Disp: 45 mL, Rfl: 1    Insulin Glargine-yfgn (Semglee, yfgn,) 100  UNIT/ML solution pen-injector, Inject 38 Units under the skin into the appropriate area as directed Daily., Disp: 30 mL, Rfl: 1    Insulin Glargine-yfgn 100 UNIT/ML solution pen-injector, Inject 38 Units under the skin into the appropriate area as directed Daily., Disp: 100 mL, Rfl: 0    Insulin Pen Needle (B-D ULTRAFINE III SHORT PEN) 31G X 8 MM misc, 1 each 4 (Four) Times a Day., Disp: 400 each, Rfl: 1    lisinopril (PRINIVIL,ZESTRIL) 10 MG tablet, Take 1 tablet by mouth Daily., Disp: 90 tablet, Rfl: 1    LORazepam (ATIVAN) 1 MG tablet, Take 1 tablet by mouth 4 (Four) Times a Day As Needed for anxiety., Disp: 120 tablet, Rfl: 2    LORazepam (ATIVAN) 1 MG tablet, Take 1 tablet by mouth 4 (Four) Times a Day As Needed for anxiety, Disp: 120 tablet, Rfl: 2    LORazepam (Ativan) 1 MG tablet, Take 1 tablet by mouth 4 (Four) Times a Day As Needed., Disp: 120 tablet, Rfl: 2    lurasidone (Latuda) 40 MG tablet tablet, Take 1 tablet by mouth Daily., Disp: 30 tablet, Rfl: 0    lurasidone (LATUDA) 40 MG tablet tablet, Take 1 tablet by mouth Daily., Disp: 30 tablet, Rfl: 0    lurasidone (LATUDA) 40 MG tablet tablet, Take 1 tablet by mouth Daily., Disp: 30 tablet, Rfl: 2    Multiple Vitamins-Minerals (MULTIVITAMIN WITH MINERALS) tablet tablet, Take 1 tablet by mouth Daily., Disp: , Rfl:     sildenafil (Viagra) 100 MG tablet, Take 1 tablet by mouth Daily As Needed for Erectile Dysfunction., Disp: 20 tablet, Rfl: 2    traZODone (DESYREL) 150 MG tablet, Take 1 tablet by mouth Every Night., Disp: 30 tablet, Rfl: 2  Medicines reviewed by Angelo Kirk RPH on 10/14/2024 at 10:24 AM    Drug Interactions  none     Relevant Laboratory Values  Lab Results   Component Value Date    GLUCOSE 126 (H) 07/05/2024    CALCIUM 9.6 07/05/2024     07/05/2024    K 5.0 07/05/2024    CO2 25.1 07/05/2024     07/05/2024    BUN 18 07/05/2024    CREATININE 1.02 07/05/2024    EGFRRESULT 89.5 03/16/2023    BCR 17.6 07/05/2024    ANIONGAP 11.9  07/05/2024     Lab Results   Component Value Date    WBC 7.23 07/05/2024    HGB 14.8 07/05/2024    HCT 45.3 07/05/2024    MCV 95.0 07/05/2024     07/05/2024    INR 1.02 04/18/2017     Lab Value Review  The above lab values have been reviewed; the following specialty medication(s) dose adjustment(s) are recommended: none.    Initial Education Provided for Specialty Medication  The patient has been provided with the following education and any applicable administration techniques (i.e. self-injection) have been demonstrated for the therapies indicated. All questions and concerns have been addressed prior to the patient receiving the medication, and the patient has verbalized understanding of the education and any materials provided. Additional patient education shall be provided and documented upon request by the patient, provider or payer.      .     Dupixent (dupilumab)         Medication Expectations   Why am I taking this medication? You are taking this medication for eosinophillic esophagitis, asthma, atopic dermatitis, or chronic rhinosinusitis with nasal polyps.   What should I expect while on this medication? You should expect a decrease in the frequency and severity of symptoms.   How does the medication work? Dupilumab is a monoclonal antibody that inhibits interleukin-4 and interleukin-13 binding. This decreases the release of proinflammatory cytokines, chemokines, nitric oxide, and lowers IgE levels in the blood.   How long will I be on this medication for? The amount of time you will be on this medication will be determined by your doctor and your response to the medication.    How do I take this medication? Take as directed on your prescription label. Allow medication to reach room temperature for 30-45 minutes prior to injection. This medication is a subcutaneous injection given in the fatty part of the skin on the top of the thigh or stomach area. May be given in upper arm by provider or  caregiver. Separate doses >/= 400 mg into two sites.   What are some possible side effects? Injection site reactions and hypersensitivity reactions, conjunctivitis, signs of a common cold, or gastritis.   What happens if I miss a dose? If you miss a dose, take it as soon as you remember. If it is close to the time for your next dose, skip the missed dose and go back to your normal time.             Medication Safety   What are things I should warn my doctor immediately about? Allergic reaction such has hives or trouble breathing. If you develop symptoms of infection such as a fever or cough that does not go away, chest pain, joint pain, dizziness, swollen glands, or numbness or tingling that is not normal.    What are things that I should be cautious of? Injection site reaction or conjunctivitis. You may have more chance of getting an infection.  Wash your hands often and stay away from people with infections, colds, or flu.   What are some medications that can interact with this one? Immunosuppressants and vaccines.            Medication Storage/Handling   How should I handle this medication? Keep this medication our of reach of pets/children in original container.  Store upright in the original container to protect from light. Do not freeze or shake. Do not inject into skin that is tender, damaged, bruised, or scarred.  Rotate injection sites.   How does this medication need to be stored? Store in refrigerator and keep dry. If needed, you may store at room temperature for up to 14 days.   How should I dispose of this medication? You can dispose of the empty syringe in a sharps container, and if this is not available you may use an empty hard plastic container such as a milk jug or tide container. Discard any unused portion or if stored outside of refrigerator > 14 days.            Resources/Support   How can I remind myself to take this medication? You can download a reminder brook on your phone or use a [employer]   to help with your injection.   Is financial support available?  Yes, Dupixent [employer] can provide co-pay assistance if you have commercial insurance or patient assistance if you have Medicare or no insurance.    Which vaccines are recommended for me? Talk to your doctor about these vaccines: Flu, Coronavirus (COVID-19), Pneumococcal (pneumonia), Tdap, Hepatitis B, Zoster (shingles). Avoid use of live vaccines while on Dupixent                Adherence, Self-Administration, and Current Therapy Problems  Adherence related to the patient's specialty therapy was discussed with the patient. The Adherence segment of this outreach has been reviewed and updated.          Additional Barriers to Patient Self-Administration: none  Methods for Supporting Patient Self-Administration: none    Open Medication Therapy Problems  No medication therapy recommendations to display    Goals of Therapy   Goals Addressed Today        Specialty Pharmacy General Goal      To reduce the size or eliminate nasal polyps, improve breathing and sense of smell              Reassessment Plan & Follow-Up  Medication Therapy Changes: patient is not new to therapy.  Patient has been receiving from [facility], he is new to [facility] and specialty pharmacy management.  Additional Plans, Therapy Recommendations, or Therapy Problems to Be Addressed: none   Pharmacist to perform regular reassessments no more than (6) months from the previous assessment.  Welcome information and patient satisfaction survey to be sent by retail team with patient's initial fill.  Care Coordinator to set up future refill outreaches, coordinate prescription delivery, and escalate clinical questions to pharmacist.     Attestation  I attest the patient was actively involved in and has agreed to the above plan of care. I attest that the initiated specialty medication(s) are appropriate for the patient based on my assessment. If the prescribed therapy is at any point deemed not appropriate  based on the current or future assessments, a consultation will be initiated with the patient's specialty care provider to determine the best course of action. The revised plan of therapy will be documented along with any reassessments and/or additional patient education provided.     Electronically signed by Angelo Kirk RPH, 10/14/24, 10:26 AM EDT.

## 2024-10-25 ENCOUNTER — SPECIALTY PHARMACY (OUTPATIENT)
Dept: PHARMACY | Facility: TELEHEALTH | Age: 54
End: 2024-10-25
Payer: COMMERCIAL

## 2024-10-25 NOTE — PROGRESS NOTES
Specialty Pharmacy Refill Coordination Note     Luis is a 54 y.o. male contacted today regarding refills of  Dupixent specialty medication(s).    Reviewed and verified with patient:  Allergies  Meds       Specialty medication(s) and dose(s) confirmed: yes    Refill Questions      Flowsheet Row Most Recent Value   Changes to allergies? No   Changes to medications? No   New conditions or infections since last clinic visit No   Unplanned office visit, urgent care, ED, or hospital admission in the last 4 weeks  No   How does patient/caregiver feel medication is working? Very good   Financial problems or insurance changes  No   Since the previous refill, were any specialty medication doses or scheduled injections missed or delayed?  No  [Next dose due: 11/6]   Does this patient require a clinical escalation to a pharmacist? No            Delivery Questions      Flowsheet Row Most Recent Value   Delivery method UPS   Delivery address verified with patient/caregiver? Yes   Delivery address Home   Number of medications in delivery 1   Medication(s) being filled and delivered Dupilumab (Dupixent)   Doses left of specialty medications 0   Copay verified? Yes   Copay amount $0.00   Copay form of payment No copayment ($0)   Ship Date 10/28/24   Delivery Date 10/29/24   Signature Required No                   Follow-up: 21 day(s)     Marylou Cohen, Pharmacy Technician  Specialty Pharmacy Technician

## 2024-10-30 ENCOUNTER — TELEPHONE (OUTPATIENT)
Dept: GASTROENTEROLOGY | Facility: CLINIC | Age: 54
End: 2024-10-30
Payer: COMMERCIAL

## 2024-10-30 NOTE — TELEPHONE ENCOUNTER
Hub staff attempted to follow warm transfer process and was unsuccessful     Caller: Luis Quintana    Relationship to patient: Self    Best call back number: [phone]    Patient is needing: PT HAS PROC 10.31.24. PT STATES HE DID NOT READ INSTRUCTIONS AND HAS BEEN TAKING ZINC. PT ALSO NEEDS TO CONFIRM TIME OF PROC. HE STATES IT HAS HAS BEEN CHANGED 3 TIMES. PLEASE CONTACT PT TO ADVISE FURTHER. URGENT

## 2024-10-31 ENCOUNTER — ANESTHESIA (OUTPATIENT)
Dept: GASTROENTEROLOGY | Facility: HOSPITAL | Age: 54
End: 2024-10-31
Payer: COMMERCIAL

## 2024-10-31 ENCOUNTER — ANESTHESIA EVENT (OUTPATIENT)
Dept: GASTROENTEROLOGY | Facility: HOSPITAL | Age: 54
End: 2024-10-31
Payer: COMMERCIAL

## 2024-10-31 ENCOUNTER — HOSPITAL ENCOUNTER (OUTPATIENT)
Facility: HOSPITAL | Age: 54
Setting detail: HOSPITAL OUTPATIENT SURGERY
Discharge: HOME | End: 2024-10-31
Attending: INTERNAL MEDICINE | Admitting: INTERNAL MEDICINE
Payer: COMMERCIAL

## 2024-10-31 VITALS
OXYGEN SATURATION: 96 % | SYSTOLIC BLOOD PRESSURE: 116 MMHG | RESPIRATION RATE: 16 BRPM | HEART RATE: 78 BPM | BODY MASS INDEX: 25.45 KG/M2 | DIASTOLIC BLOOD PRESSURE: 73 MMHG | HEIGHT: 74 IN | WEIGHT: 198.3 LBS

## 2024-10-31 DIAGNOSIS — K63.5 POLYP OF COLON, UNSPECIFIED PART OF COLON, UNSPECIFIED TYPE: ICD-10-CM

## 2024-10-31 LAB — GLUCOSE BLDC GLUCOMTR-MCNC: 133 MG/DL (ref 70–130)

## 2024-10-31 PROCEDURE — 25010000002 PROPOFOL 10 MG/ML EMULSION: Performed by: NURSE ANESTHETIST, CERTIFIED REGISTERED

## 2024-10-31 PROCEDURE — 88305 TISSUE EXAM BY PATHOLOGIST: CPT | Performed by: INTERNAL MEDICINE

## 2024-10-31 PROCEDURE — 82948 REAGENT STRIP/BLOOD GLUCOSE: CPT

## 2024-10-31 PROCEDURE — 45385 COLONOSCOPY W/LESION REMOVAL: CPT | Performed by: INTERNAL MEDICINE

## 2024-10-31 PROCEDURE — 45380 COLONOSCOPY AND BIOPSY: CPT | Performed by: INTERNAL MEDICINE

## 2024-10-31 PROCEDURE — 25810000003 LACTATED RINGERS PER 1000 ML: Performed by: INTERNAL MEDICINE

## 2024-10-31 RX ORDER — PROPOFOL 10 MG/ML
VIAL (ML) INTRAVENOUS AS NEEDED
Status: DISCONTINUED | OUTPATIENT
Start: 2024-10-31 | End: 2024-10-31 | Stop reason: SURG

## 2024-10-31 RX ORDER — SODIUM CHLORIDE, SODIUM LACTATE, POTASSIUM CHLORIDE, CALCIUM CHLORIDE 600; 310; 30; 20 MG/100ML; MG/100ML; MG/100ML; MG/100ML
30 INJECTION, SOLUTION INTRAVENOUS CONTINUOUS
Status: DISCONTINUED | OUTPATIENT
Start: 2024-10-31 | End: 2024-10-31 | Stop reason: HOSPADM

## 2024-10-31 RX ADMIN — PROPOFOL 180 MCG/KG/MIN: 10 INJECTION, EMULSION INTRAVENOUS at 10:04

## 2024-10-31 RX ADMIN — PROPOFOL 100 MG: 10 INJECTION, EMULSION INTRAVENOUS at 10:04

## 2024-10-31 RX ADMIN — SODIUM CHLORIDE, SODIUM LACTATE, POTASSIUM CHLORIDE, CALCIUM CHLORIDE 30 ML/HR: 20; 30; 600; 310 INJECTION, SOLUTION INTRAVENOUS at 09:51

## 2024-10-31 NOTE — ANESTHESIA PREPROCEDURE EVALUATION
Anesthesia Evaluation     Patient summary reviewed and Nursing notes reviewed                Airway   Mallampati: III  Dental      Pulmonary    (+) a smoker Former,  Cardiovascular     ECG reviewed  Rhythm: regular  Rate: normal    (+) hypertension, hyperlipidemia      Neuro/Psych  (+) psychiatric history Anxiety and Depression  GI/Hepatic/Renal/Endo    (+) obesity, hepatitis C, liver disease, diabetes mellitus type 1 using insulin    Musculoskeletal (-) negative ROS    Abdominal    Substance History   (+) alcohol use     OB/GYN negative ob/gyn ROS         Other                    Anesthesia Plan    ASA 3     MAC     (Chart review by SILVESTRE Fuentes- personal )  intravenous induction     Anesthetic plan, risks, benefits, and alternatives have been provided, discussed and informed consent has been obtained with: other.    CODE STATUS:

## 2024-10-31 NOTE — ANESTHESIA POSTPROCEDURE EVALUATION
Patient: Luis Quintana    Procedure Summary       Date: 10/31/24 Room / Location: [facility] ENDOSCOPY 10 / [facility] ENDOSCOPY    Anesthesia Start: 0959 Anesthesia Stop: 1026    Procedure: COLONOSCOPY to cecum with cold snare polypectomy and cold bx polypectomy Diagnosis:       Polyp of colon, unspecified part of colon, unspecified type      (Polyp of colon, unspecified part of colon, unspecified type [K63.5])    Surgeons: Lupillo Patino MD Provider: Eloy Gaffney MD    Anesthesia Type: MAC ASA Status: 3            Anesthesia Type: MAC    Vitals  Vitals Value Taken Time   /75 10/31/24 1034   Temp     Pulse 81 10/31/24 1041   Resp 16 10/31/24 1033   SpO2 95 % 10/31/24 1041   Vitals shown include unfiled device data.        Post Anesthesia Care and Evaluation    Patient location during evaluation: PACU  Patient participation: complete - patient participated  Level of consciousness: awake and alert  Pain management: adequate    Airway patency: patent  Anesthetic complications: No anesthetic complications    Cardiovascular status: acceptable  Respiratory status: acceptable  Hydration status: acceptable    Comments: --------------------            10/31/24               1033     --------------------   BP:       101/75     Pulse:      76       Resp:       16       SpO2:      95%      --------------------

## 2024-10-31 NOTE — DISCHARGE INSTRUCTIONS
For the next 24 hours patient needs to be with a responsible adult.    For 24 hours DO NOT drive, operate machinery, appliances, drink alcohol, make important decisions or sign legal documents.    Start with a light or bland diet if you are feeling sick to your stomach otherwise advance to regular diet as tolerated.    Follow recommendations on procedure report if provided by your doctor.    Call Dr Patino for problems [phone]    Problems may include but not limited to: large amounts of bleeding, trouble breathing, repeated vomiting, severe unrelieved pain, fever or chills.

## 2024-10-31 NOTE — H&P
[facility]  Pre Procedure History & Physical    Chief Complaint:   Time for my colonoscopy    Subjective     HPI:   54 y.o. male presenting to endoscopy unit today for surveillance colonoscopy.    Past Medical History:   Past Medical History:   Diagnosis Date    Allergic     Anxiety     Colon polyp     Depression     Diabetes mellitus     Erectile dysfunction     Hemorrhoids     Hyperlipidemia     Hypertension     Infectious viral hepatitis     Hep C       Family History:  Family History   Problem Relation Age of Onset    Arrhythmia Mother     Diabetes Maternal Grandfather     Cancer Maternal Grandfather        Social History:   reports that he quit smoking about 10 years ago. His smoking use included cigarettes. He started smoking about 35 years ago. He has a 15 pack-year smoking history. He has never used smokeless tobacco. He reports current alcohol use of about 2.0 standard drinks of alcohol per week. He reports that he does not use drugs.    Medications:   Medications Prior to Admission   Medication Sig Dispense Refill Last Dose/Taking    buPROPion XL (WELLBUTRIN XL) 150 MG 24 hr tablet Take 1 tablet by mouth Every Morning. 30 tablet 0 10/30/2024 Morning    Continuous Blood Gluc Sensor (Dexcom G7 Sensor) misc Use 1 each Daily. Change every 10 days.   10/31/2024 Morning    Dupilumab (Dupixent) 300 MG/2ML solution pen-injector Inject 2 mL under the skin into the appropriate area as directed Every 14 (Fourteen) Days. 4 mL 11 Past Week    insulin aspart (NovoLOG FlexPen) 100 UNIT/ML solution pen-injector sc pen Inject 15 Units under the skin into the appropriate area as directed 3 (Three) Times a Day With Meals. 45 mL 1 10/31/2024 Morning    lisinopril (PRINIVIL,ZESTRIL) 10 MG tablet Take 1 tablet by mouth Daily. 90 tablet 1 Past Week    LORazepam (ATIVAN) 1 MG tablet Take 1 tablet by mouth 4 (Four) Times a Day As Needed for anxiety. 120 tablet 2 10/30/2024 Morning    LORazepam (ATIVAN) 1  "MG tablet Take 1 tablet by mouth 4 (Four) Times a Day As Needed for anxiety 120 tablet 2 10/30/2024 Morning    lurasidone (Latuda) 40 MG tablet tablet Take 1 tablet by mouth Daily. 30 tablet 0 10/30/2024    Multiple Vitamins-Minerals (MULTIVITAMIN WITH MINERALS) tablet tablet Take 1 tablet by mouth Daily.   10/30/2024    traZODone (DESYREL) 150 MG tablet Take 1 tablet by mouth Every Night. 30 tablet 2 10/30/2024    buPROPion XL (WELLBUTRIN XL) 300 MG 24 hr tablet Take 1 tablet by mouth Every Morning. 30 tablet 2     Continuous Blood Gluc Transmit (Dexcom G6 Transmitter) misc 1 each Every 3 (Three) Months. 2 each 1     fexofenadine (Allegra Allergy) 180 MG tablet Take 1 tablet by mouth Daily for 10 days. 10 tablet 0     Insulin Glargine-yfgn (Semglee, yfgn,) 100 UNIT/ML solution pen-injector Inject 38 Units under the skin into the appropriate area as directed Daily. 30 mL 1     Insulin Glargine-yfgn 100 UNIT/ML solution pen-injector Inject 38 Units under the skin into the appropriate area as directed Daily. 100 mL 0     Insulin Pen Needle (B-D ULTRAFINE III SHORT PEN) 31G X 8 MM misc 1 each 4 (Four) Times a Day. 400 each 1     LORazepam (Ativan) 1 MG tablet Take 1 tablet by mouth 4 (Four) Times a Day As Needed. 120 tablet 2     lurasidone (LATUDA) 40 MG tablet tablet Take 1 tablet by mouth Daily. 30 tablet 0     lurasidone (LATUDA) 40 MG tablet tablet Take 1 tablet by mouth Daily. 30 tablet 2     sildenafil (Viagra) 100 MG tablet Take 1 tablet by mouth Daily As Needed for Erectile Dysfunction. 20 tablet 2 Unknown       Allergies:  Penicillins and Sulfa antibiotics    Objective     Blood pressure 156/86, pulse 88, resp. rate 18, height 188 cm (74\"), weight 89.9 kg (198 lb 4.8 oz), SpO2 98%.  Physical Exam:   General: patient awake, alert and cooperative    Assessment & Plan     Diagnosis:  Personal hx of colon polyps    Anticipated Surgical Procedure:  Colonoscopy    The risks, benefits, and alternatives of this " procedure have been discussed with the patient or the responsible party- the patient understands and agrees to proceed.

## 2024-11-01 LAB
CYTO UR: NORMAL
LAB AP CASE REPORT: NORMAL
PATH REPORT.FINAL DX SPEC: NORMAL
PATH REPORT.GROSS SPEC: NORMAL

## 2024-11-15 ENCOUNTER — TELEPHONE (OUTPATIENT)
Dept: GASTROENTEROLOGY | Facility: CLINIC | Age: 54
End: 2024-11-15
Payer: COMMERCIAL

## 2024-11-15 NOTE — TELEPHONE ENCOUNTER
Sent pt Ladihart msg advising of results and recommendations. Advised to call if any questions.     Colonoscopy placed in  and recall for 10/31/29.

## 2024-11-15 NOTE — TELEPHONE ENCOUNTER
----- Message from Lupillo Patino sent at 11/4/2024  2:15 PM EST -----  The polyp(s) showed hyperplastic change, which is non-cancerous and not pre-cancerous. Follow-up colonoscopy in 5 years is advised.

## 2024-11-27 RX ORDER — ACYCLOVIR 400 MG/1
1 TABLET ORAL DAILY
Qty: 1 EACH | Refills: 0 | OUTPATIENT
Start: 2024-11-27

## 2024-11-27 NOTE — TELEPHONE ENCOUNTER
Caller: Luis Quintana    Relationship: Self    Best call back number: [phone]     Requested Prescriptions:   Requested Prescriptions     Pending Prescriptions Disp Refills    Continuous Glucose Sensor (Dexcom G7 Sensor) misc       Sig: Use 1 each Daily. Change every 10 days.        Pharmacy where request should be sent: [facility]     Last office visit with prescribing clinician: 6/26/2024   Last telemedicine visit with prescribing clinician: Visit date not found   Next office visit with prescribing clinician: Visit date not found     Additional details provided by patient: PT NEEDS 90 DAY SUPPLY    Does the patient have less than a 3 day supply:  [] Yes  [x] No    Would you like a call back once the refill request has been completed: [] Yes [x] No    If the office needs to give you a call back, can they leave a voicemail: [] Yes [x] No    Julius Archer Rep   11/27/24 14:31 EST

## 2024-11-27 NOTE — TELEPHONE ENCOUNTER
Rx Refill Note  Requested Prescriptions     Pending Prescriptions Disp Refills    Continuous Glucose Sensor (Dexcom G7 Sensor) misc 1 each 0     Sig: Use 1 each Daily. Change every 10 days.      Last office visit with prescribing clinician: 6/26/2024  Next office visit with prescribing clinician: 12/27/2024         Carrie Bobo MA  11/27/24, 16:06 EST

## 2024-12-02 DIAGNOSIS — E10.9 TYPE 1 DIABETES MELLITUS WITHOUT COMPLICATION: ICD-10-CM

## 2024-12-03 RX ORDER — PROCHLORPERAZINE 25 MG/1
SUPPOSITORY RECTAL
Qty: 9 EACH | Refills: 1 | Status: SHIPPED | OUTPATIENT
Start: 2024-12-03 | End: 2024-12-03

## 2024-12-03 RX ORDER — ACYCLOVIR 400 MG/1
1 TABLET ORAL DAILY
Qty: 9 EACH | Refills: 1 | Status: SHIPPED | OUTPATIENT
Start: 2024-12-03

## 2024-12-04 ENCOUNTER — SPECIALTY PHARMACY (OUTPATIENT)
Dept: PHARMACY | Facility: TELEHEALTH | Age: 54
End: 2024-12-04
Payer: COMMERCIAL

## 2024-12-04 NOTE — PROGRESS NOTES
Specialty Pharmacy Refill Coordination Note     Luis is a 54 y.o. male contacted today regarding refills of  Dupixent specialty medication(s).    Reviewed and verified with patient:  Allergies  Meds       Specialty medication(s) and dose(s) confirmed: yes    Refill Questions      Flowsheet Row Most Recent Value   Changes to allergies? No   Changes to medications? No   New conditions or infections since last clinic visit No   Unplanned office visit, urgent care, ED, or hospital admission in the last 4 weeks  No   How does patient/caregiver feel medication is working? Very good   Financial problems or insurance changes  No   Since the previous refill, were any specialty medication doses or scheduled injections missed or delayed?  No  [Next dose due 12/4]   Does this patient require a clinical escalation to a pharmacist? No            Delivery Questions      Flowsheet Row Most Recent Value   Delivery method UPS   Delivery address verified with patient/caregiver? Yes   Delivery address Home   Number of medications in delivery 1   Medication(s) being filled and delivered Dupilumab (Dupixent)   Doses left of specialty medications 1   Copay verified? Yes   Copay amount $30.19 **Dupixent [facility]**   Copay form of payment Credit/debit on file   Ship Date 12/5/24   Delivery Date 12/6/24   Signature Required No                   Follow-up: 21 day(s)     Marylou Cohen, Pharmacy Technician  Specialty Pharmacy Technician

## 2024-12-27 ENCOUNTER — OFFICE VISIT (OUTPATIENT)
Dept: INTERNAL MEDICINE | Facility: CLINIC | Age: 54
End: 2024-12-27
Payer: COMMERCIAL

## 2024-12-27 VITALS
SYSTOLIC BLOOD PRESSURE: 128 MMHG | BODY MASS INDEX: 26.89 KG/M2 | OXYGEN SATURATION: 98 % | HEIGHT: 74 IN | WEIGHT: 209.5 LBS | DIASTOLIC BLOOD PRESSURE: 82 MMHG | HEART RATE: 106 BPM

## 2024-12-27 DIAGNOSIS — E10.9 TYPE 1 DIABETES MELLITUS WITHOUT COMPLICATION: Primary | ICD-10-CM

## 2024-12-27 DIAGNOSIS — F41.8 DEPRESSION WITH ANXIETY: ICD-10-CM

## 2024-12-27 DIAGNOSIS — I10 ESSENTIAL HYPERTENSION: Chronic | ICD-10-CM

## 2024-12-27 DIAGNOSIS — E78.2 MIXED HYPERLIPIDEMIA: Chronic | ICD-10-CM

## 2024-12-27 PROCEDURE — 99214 OFFICE O/P EST MOD 30 MIN: CPT | Performed by: NURSE PRACTITIONER

## 2024-12-27 RX ORDER — ROSUVASTATIN CALCIUM 5 MG/1
5 TABLET, COATED ORAL DAILY
Qty: 90 TABLET | Refills: 1 | Status: SHIPPED | OUTPATIENT
Start: 2024-12-27

## 2024-12-27 RX ORDER — INSULIN GLARGINE-YFGN 100 [IU]/ML
38 INJECTION, SOLUTION SUBCUTANEOUS DAILY
Qty: 100 ML | Refills: 1 | Status: SHIPPED | OUTPATIENT
Start: 2024-12-27

## 2024-12-27 NOTE — ASSESSMENT & PLAN NOTE
Lipid abnormalities are improving with treatment    Plan:  Continue same medication/s without change.    Continue crestor.     Discussed medication dosage, use, side effects, and goals of treatment in detail.    Counseled patient on lifestyle modifications to help control hyperlipidemia.     Patient Treatment Goals:   LDL goal is under 100  Lab Results   Component Value Date    CHOL 154 07/05/2024    CHLPL 160 03/16/2023    TRIG 80 07/05/2024    HDL 58 07/05/2024    LDL 81 07/05/2024      Followup in 6 months.

## 2024-12-27 NOTE — ASSESSMENT & PLAN NOTE
Feels has not improved.   Agrees to referral to [facility].    Needs to implement a daily routine, sleep schedule.   Discussed as he works from home to go out of home to work on computer at remote site.   Denies SI

## 2024-12-27 NOTE — PROGRESS NOTES
"        Chief Complaint  Hypertension     Subjective:      History of Present Illness {  Problem List  Visit  Diagnosis   Encounters  Notes  Medications  Labs  Result Review Imaging  Media :23}     Luis Quintana presents to [facility] for:        Hypertension  This is a chronic problem. The problem is unchanged. The problem is controlled. Pertinent negatives include no chest pain or peripheral edema. Risk factors for coronary artery disease include diabetes mellitus, dyslipidemia and male gender. Current antihypertension treatment includes ACE inhibitors.   The current treatment provides significant improvement. There are no compliance problems.      Diabetes  He presents for his follow-up diabetic visit. He has type 1 diabetes mellitus. Pertinent negatives for diabetes include no chest pain, no polydipsia, no polyphagia and no polyuria. Current diabetic treatment includes insulin injections. He is following a diabetic diet. An ACE inhibitor/angiotensin II receptor blocker is being taken.        Depression/ Anxiety  : latuda, ativan, wellbutrin     Dr Rai Lozada  System     Chronic: started after his father's passing.  States he does not get involved with things. Works from home.   He has not been in a relationship for some time.  Sleeps more than he should during the day and stays up late.   States at  does not get therapy - \"just asks if needs to increase medication\"     States when went to meet his family: mood was improved.     Denies alcohol     He has radon mitigation sys.     Answers submitted by the patient for this visit:  Primary Reason for Visit (Submitted on 12/26/2024)  What is the primary reason for your visit?: Depression  Depression (Submitted on 12/26/2024)  Chief Complaint: Depression  Visit: follow-up  Frequency: constantly  Severity: severe  confusion: No  dizziness: No  excessive worry: Yes  insomnia: Yes  irritability: " Yes  malaise/fatigue: Yes  obsessions: No  hypersomnia: Yes  difficulty controlling mood: Yes  difficulty staying asleep: Yes  difficulty falling asleep: Yes  Hours of sleep per night: 10 Hours  Medication compliance: %  Side effects: fatigue, sexual problems  Aggravated by: family issues, social activities, work stress                    I have reviewed patient's medical history, any new submitted information provided by patient or medical assistant and updated medical record.      Objective:      Physical Exam  Vitals reviewed.   Constitutional:       Appearance: Normal appearance. He is well-developed.   Neck:      Thyroid: No thyromegaly.   Cardiovascular:      Rate and Rhythm: Normal rate and regular rhythm.      Pulses: Normal pulses.      Heart sounds: Normal heart sounds.   Pulmonary:      Effort: Pulmonary effort is normal.      Breath sounds: Normal breath sounds.      Comments: E/U   Feet:      Comments: Diabetic Foot Exam Performed and Monofilament Test Performed     Lymphadenopathy:      Cervical: No cervical adenopathy.   Neurological:      Mental Status: He is alert and oriented to person, place, and time.   Psychiatric:         Mood and Affect: Mood normal.         Behavior: Behavior is cooperative.        Result Review  Data Reviewed:{ Labs  Result Review  Imaging  Med Tab  Media :23}     The following data was reviewed by: Delia Gresham III, NP-C on 12/27/2024  Common labs          7/5/2024    08:55   Common Labs   Glucose 126    BUN 18    Creatinine 1.02    Sodium 138    Potassium 5.0    Chloride 101    Calcium 9.6    Albumin 4.5    Total Bilirubin 0.4    Alkaline Phosphatase 73    AST (SGOT) 22    ALT (SGPT) 18    WBC 7.23    Hemoglobin 14.8    Hematocrit 45.3    Platelets 276    Total Cholesterol 154    Triglycerides 80    HDL Cholesterol 58    LDL Cholesterol  81    Hemoglobin A1C 6.00    Microalbumin, Urine 1.6    PSA 0.243             Vital Signs:   /82 (BP  "Location: Left arm, Patient Position: Sitting, Cuff Size: Adult)   Pulse 106   Ht 188 cm (74\")   Wt 95 kg (209 lb 8 oz)   SpO2 98%   BMI 26.90 kg/m²   Estimated body mass index is 26.9 kg/m² as calculated from the following:    Height as of this encounter: 188 cm (74\").    Weight as of this encounter: 95 kg (209 lb 8 oz).        Requested Prescriptions     Signed Prescriptions Disp Refills    rosuvastatin (Crestor) 5 MG tablet 90 tablet 1     Sig: Take 1 tablet by mouth Daily.    Insulin Glargine-yfgn 100 UNIT/ML solution pen-injector 100 mL 1     Sig: Inject 38 Units under the skin into the appropriate area as directed Daily.       Routine medications provided by this office will also be refilled via pharmacy request.       Current Outpatient Medications:     buPROPion XL (WELLBUTRIN XL) 300 MG 24 hr tablet, Take 1 tablet by mouth Every Morning., Disp: 30 tablet, Rfl: 0    Continuous Glucose Sensor (Dexcom G7 Sensor) misc, Use Daily. Change every 10 days., Disp: 9 each, Rfl: 1    Dupilumab (Dupixent) 300 MG/2ML solution auto-injector injection, Inject 2 mL under the skin into the appropriate area as directed Every 14 (Fourteen) Days., Disp: 4 mL, Rfl: 11    insulin aspart (NovoLOG FlexPen) 100 UNIT/ML solution pen-injector sc pen, Inject 15 Units under the skin into the appropriate area as directed 3 (Three) Times a Day With Meals., Disp: 45 mL, Rfl: 1    Insulin Glargine-yfgn 100 UNIT/ML solution pen-injector, Inject 38 Units under the skin into the appropriate area as directed Daily., Disp: 100 mL, Rfl: 1    Insulin Pen Needle (B-D ULTRAFINE III SHORT PEN) 31G X 8 MM misc, 1 each 4 (Four) Times a Day., Disp: 400 each, Rfl: 1    lisinopril (PRINIVIL,ZESTRIL) 10 MG tablet, Take 1 tablet by mouth Daily., Disp: 90 tablet, Rfl: 1    LORazepam (Ativan) 1 MG tablet, Take 1 tablet by mouth 4 (Four) Times a Day As Needed., Disp: 120 tablet, Rfl: 2    lurasidone (LATUDA) 40 MG tablet tablet, Take 1 tablet by mouth " Daily., Disp: 30 tablet, Rfl: 2    Multiple Vitamins-Minerals (MULTIVITAMIN WITH MINERALS) tablet tablet, Take 1 tablet by mouth Daily., Disp: , Rfl:     rosuvastatin (Crestor) 5 MG tablet, Take 1 tablet by mouth Daily., Disp: 90 tablet, Rfl: 1    sildenafil (Viagra) 100 MG tablet, Take 1 tablet by mouth Daily As Needed for Erectile Dysfunction., Disp: 20 tablet, Rfl: 2    traZODone (DESYREL) 150 MG tablet, Take 1 tablet by mouth Every Night., Disp: 30 tablet, Rfl: 2    fexofenadine (Allegra Allergy) 180 MG tablet, Take 1 tablet by mouth Daily for 10 days., Disp: 10 tablet, Rfl: 0     Assessment and Plan:      Assessment and Plan {CC Problem List  Visit Diagnosis  ROS  Review (Popup)  [facility] Maintenance  Quality  BestPractice  Medications  SmartSets  SnapShot Encounters  Media :23}     Diagnoses and all orders for this visit:    1. Type 1 diabetes mellitus without complication (Primary)  Overview:  Dx:   He has a Dexcom G7   CGM since 2019    Renal protection: lisinopril     Assessment & Plan:  Your last A1C (3 month average) =   Lab Results   Component Value Date    HGBA1C 6.00 (H) 07/05/2024      Your diabetes is Controlled.    Plan    [x] Continue same treatment: insurance no longer covering semglee, change to same dose of glargine.     [] Will modify dose/ treatment if needed based upon lab results today.     [] Change:       Diet: low carbohydrate, low sugar.     ADA Website for diabetic food choices.   https://diabetes.org/food-nutrition/eating-healthy    The American Diabetes Association recommends an A1C of less than 7%.  A1C Average Levels Blood Sugar:   6%  126 mg/dL  7%  154 mg/dL  8%  183 mg/dL  9%  212 mg/dL  10%  240 mg/dL  11%  269 mg/dL  12%  298 mg/dL    Glucose goals for many adults with diabetes  Blood sugar before meals  mg/dL  Blood sugar 1-2 hours after the start of a meal Less than 180 mg/dL A1C Less than 7%    Eye Health:   You need a diabetic eye exam yearly.   Please have  a copy of the note faxed to my office.   Fax: [phone]    Foot Health:   You need a diabetic foot exam yearly.   Check your feet routinely for any wounds.   You should always check your shoes for any debris that could cause a wound.          Patient is being treated for diabetes.     Continuation Note:     [x] Patient is being seen every 6 months for diabetes   [x] CGM continues to be use to improve glycemic control   []      Lab Results   Component Value Date    HGBA1C 6.00 (H) 07/05/2024         Orders:  -     Comprehensive Metabolic Panel  -     CBC (No Diff)  -     Hemoglobin A1c  -     Insulin Glargine-yfgn 100 UNIT/ML solution pen-injector; Inject 38 Units under the skin into the appropriate area as directed Daily.  Dispense: 100 mL; Refill: 1    2. Depression with anxiety  Overview:  Dr Riggs   Wellburtrin (at 300 mg caused diarrhea)   Also takes trazadone for insomnia   Prior treatment:  prozac     Lorazepam: Gabodeir     Assessment & Plan:  Feels has not improved.   Agrees to referral to [facility].    Needs to implement a daily routine, sleep schedule.   Discussed as he works from home to go out of home to work on computer at remote site.   Denies SI        Orders:  -     TSH Rfx On Abnormal To Free T4  -     Ambulatory Referral to Behavioral Health    3. Mixed hyperlipidemia  Overview:  Hx smoking    Assessment & Plan:   Lipid abnormalities are improving with treatment    Plan:  Continue same medication/s without change.    Continue crestor.     Discussed medication dosage, use, side effects, and goals of treatment in detail.    Counseled patient on lifestyle modifications to help control hyperlipidemia.     Patient Treatment Goals:   LDL goal is under 100  Lab Results   Component Value Date    CHOL 154 07/05/2024    CHLPL 160 03/16/2023    TRIG 80 07/05/2024    HDL 58 07/05/2024    LDL 81 07/05/2024      Followup in 6 months.    Orders:  -     rosuvastatin (Crestor) 5 MG tablet; Take 1 tablet by  mouth Daily.  Dispense: 90 tablet; Refill: 1    4. Essential hypertension  Assessment & Plan:  Hypertension is stable and controlled  Continue current treatment regimen.  Dietary sodium restriction.  Regular aerobic exercise.  Blood pressure will be reassessed in 6 months.    Continue lisinopril 10 mg daily                New Medications Ordered This Visit   Medications    rosuvastatin (Crestor) 5 MG tablet     Sig: Take 1 tablet by mouth Daily.     Dispense:  90 tablet     Refill:  1    Insulin Glargine-yfgn 100 UNIT/ML solution pen-injector     Sig: Inject 38 Units under the skin into the appropriate area as directed Daily.     Dispense:  100 mL     Refill:  1     Change from Semglee pen 90 days.             Follow Up {Instructions Charge Capture  Follow-up Communications :23}     Return in about 6 months (around 6/27/2025) for Annual physical.      Patient was given instructions and counseling regarding his condition or for health maintenance advice. Please see specific information pulled into the AVS if appropriate.    Dragon disclaimer:   Much of this encounter note is an electronic transcription/translation of spoken language to printed text. The electronic translation of spoken language may permit erroneous, or at times, nonsensical words or phrases to be inadvertently transcribed; Although I have reviewed the note for such errors, some may still exist.     Additional Patient Counseling:       There are no Patient Instructions on file for this visit.

## 2024-12-27 NOTE — ASSESSMENT & PLAN NOTE
Your last A1C (3 month average) =   Lab Results   Component Value Date    HGBA1C 6.00 (H) 07/05/2024      Your diabetes is Controlled.    Plan    [x] Continue same treatment: insurance no longer covering semglee, change to same dose of glargine.     [] Will modify dose/ treatment if needed based upon lab results today.     [] Change:       Diet: low carbohydrate, low sugar.     ADA Website for diabetic food choices.   https://diabetes.org/food-nutrition/eating-healthy    The American Diabetes Association recommends an A1C of less than 7%.  A1C Average Levels Blood Sugar:   6%  126 mg/dL  7%  154 mg/dL  8%  183 mg/dL  9%  212 mg/dL  10%  240 mg/dL  11%  269 mg/dL  12%  298 mg/dL    Glucose goals for many adults with diabetes  Blood sugar before meals  mg/dL  Blood sugar 1-2 hours after the start of a meal Less than 180 mg/dL A1C Less than 7%    Eye Health:   You need a diabetic eye exam yearly.   Please have a copy of the note faxed to my office.   Fax: [phone]    Foot Health:   You need a diabetic foot exam yearly.   Check your feet routinely for any wounds.   You should always check your shoes for any debris that could cause a wound.          Patient is being treated for diabetes.     Continuation Note:     [x] Patient is being seen every 6 months for diabetes   [x] CGM continues to be use to improve glycemic control   []      Lab Results   Component Value Date    HGBA1C 6.00 (H) 07/05/2024

## 2024-12-30 ENCOUNTER — SPECIALTY PHARMACY (OUTPATIENT)
Dept: PHARMACY | Facility: TELEHEALTH | Age: 54
End: 2024-12-30
Payer: COMMERCIAL

## 2024-12-31 ENCOUNTER — SPECIALTY PHARMACY (OUTPATIENT)
Dept: PHARMACY | Facility: TELEHEALTH | Age: 54
End: 2024-12-31
Payer: COMMERCIAL

## 2024-12-31 NOTE — PROGRESS NOTES
Specialty Pharmacy Refill Coordination Note     Luis is a 54 y.o. male contacted today regarding refills of  Dupixent specialty medication(s).    Reviewed and verified with patient:  Allergies  Meds       Specialty medication(s) and dose(s) confirmed: yes    Refill Questions      Flowsheet Row Most Recent Value   Changes to allergies? No   Changes to medications? No   New conditions or infections since last clinic visit No   Unplanned office visit, urgent care, ED, or hospital admission in the last 4 weeks  No   How does patient/caregiver feel medication is working? Very good   Financial problems or insurance changes  No   Since the previous refill, were any specialty medication doses or scheduled injections missed or delayed?  No  [Next dose due 1/1]   Does this patient require a clinical escalation to a pharmacist? No            Delivery Questions      Flowsheet Row Most Recent Value   Delivery method UPS   Delivery address verified with patient/caregiver? Yes   Delivery address Home   Number of medications in delivery 1   Medication(s) being filled and delivered Dupilumab (Dupixent)   Doses left of specialty medications 1   Copay verified? Yes   Copay amount $0.00   Copay form of payment No copayment ($0)   Ship Date 1/2/25   Delivery Date 1/3/25   Signature Required No                   Follow-up: 21 day(s)     Marylou Cohen, Pharmacy Technician  Specialty Pharmacy Technician

## 2025-01-23 ENCOUNTER — SPECIALTY PHARMACY (OUTPATIENT)
Dept: PHARMACY | Facility: TELEHEALTH | Age: 55
End: 2025-01-23
Payer: COMMERCIAL

## 2025-01-23 NOTE — PROGRESS NOTES
Specialty Pharmacy Refill Coordination Note     Luis is a 54 y.o. male contacted today regarding refills of  Dupixent specialty medication(s).    Reviewed and verified with patient:  Allergies  Meds       Specialty medication(s) and dose(s) confirmed: yes    Refill Questions      Flowsheet Row Most Recent Value   Changes to allergies? No   Changes to medications? No   New conditions or infections since last clinic visit No   Unplanned office visit, urgent care, ED, or hospital admission in the last 4 weeks  No   How does patient/caregiver feel medication is working? Very good   Financial problems or insurance changes  No   Since the previous refill, were any specialty medication doses or scheduled injections missed or delayed?  No  [Next dose due 1/29]   Does this patient require a clinical escalation to a pharmacist? No            Delivery Questions      Flowsheet Row Most Recent Value   Delivery method UPS   Delivery address verified with patient/caregiver? Yes   Delivery address Home   Number of medications in delivery 1   Medication(s) being filled and delivered Dupilumab (Dupixent)   Doses left of specialty medications 0   Copay verified? Yes   Copay amount $57.50 **Dupixent [facility]**   Copay form of payment Credit/debit on file   Ship Date 1/23/25   Delivery Date Selection 01/24/25   Signature Required No                   Follow-up: 21 day(s)     Marylou Cohen, Pharmacy Technician  Specialty Pharmacy Technician

## 2025-02-13 ENCOUNTER — SPECIALTY PHARMACY (OUTPATIENT)
Dept: PHARMACY | Facility: TELEHEALTH | Age: 55
End: 2025-02-13
Payer: COMMERCIAL

## 2025-02-13 DIAGNOSIS — N52.01 ERECTILE DYSFUNCTION DUE TO ARTERIAL INSUFFICIENCY: ICD-10-CM

## 2025-02-13 RX ORDER — SILDENAFIL 100 MG/1
100 TABLET, FILM COATED ORAL DAILY PRN
Qty: 20 TABLET | Refills: 2 | Status: SHIPPED | OUTPATIENT
Start: 2025-02-13

## 2025-02-13 NOTE — PROGRESS NOTES
Specialty Pharmacy Patient Management Program  Refill Outreach     Luis was contacted today regarding refills of their medication(s).    Refill Questions      Flowsheet Row Most Recent Value   Changes to allergies? No   Changes to medications? No   New conditions or infections since last clinic visit No   Unplanned office visit, urgent care, ED, or hospital admission in the last 4 weeks  No   How does patient/caregiver feel medication is working? Very good   Financial problems or insurance changes  No   Since the previous refill, were any specialty medication doses or scheduled injections missed or delayed?  No  [Next dose due 2/26]   Does this patient require a clinical escalation to a pharmacist? No            Delivery Questions      Flowsheet Row Most Recent Value   Delivery method UPS   Delivery address verified with patient/caregiver? Yes   Delivery address Home   Number of medications in delivery 1   Medication(s) being filled and delivered Dupilumab (Dupixent)   Doses left of specialty medications 1   Copay verified? Yes   Copay amount $57.50 **Dupixent [facility]COF**   Copay form of payment Credit/debit on file   Ship Date 2/13/25   Delivery Date Selection 02/14/25   Signature Required No                 Follow-up: 21 day(s)     Marylou Cohen, Pharmacy Technician  2/13/2025  09:25 EST

## 2025-02-25 ENCOUNTER — OFFICE VISIT (OUTPATIENT)
Dept: INTERNAL MEDICINE | Facility: CLINIC | Age: 55
End: 2025-02-25
Payer: COMMERCIAL

## 2025-02-25 VITALS
OXYGEN SATURATION: 98 % | HEART RATE: 105 BPM | DIASTOLIC BLOOD PRESSURE: 72 MMHG | WEIGHT: 209.6 LBS | BODY MASS INDEX: 26.9 KG/M2 | SYSTOLIC BLOOD PRESSURE: 120 MMHG | HEIGHT: 74 IN

## 2025-02-25 DIAGNOSIS — R21 RASH: Primary | ICD-10-CM

## 2025-02-25 PROCEDURE — 99213 OFFICE O/P EST LOW 20 MIN: CPT | Performed by: NURSE PRACTITIONER

## 2025-02-25 RX ORDER — CLOTRIMAZOLE AND BETAMETHASONE DIPROPIONATE 10; .64 MG/G; MG/G
1 CREAM TOPICAL 2 TIMES DAILY
Qty: 45 G | Refills: 1 | Status: SHIPPED | OUTPATIENT
Start: 2025-02-25

## 2025-02-25 NOTE — PROGRESS NOTES
"        Chief Complaint  Skin Problem (Red blotchy dots -2/3 weeks )     Subjective:      History of Present Illness {CC  Problem List  Visit  Diagnosis   Encounters  Notes  Medications  Labs  Result Review Imaging  Media :23}     Luis Quintana presents to [facility] for:      Right index finger: 4 weeks ago.   No change in soaps/ detergents.     Now in both axilla and bl ue   Does not itch.     Chews nicorette gum                 I have reviewed patient's medical history, any new submitted information provided by patient or medical assistant and updated medical record.      Objective:      Physical Exam  Cardiovascular:      Rate and Rhythm: Normal rate and regular rhythm.      Pulses: Normal pulses.      Heart sounds: Normal heart sounds.   Pulmonary:      Effort: Pulmonary effort is normal.      Breath sounds: Normal breath sounds.   Skin:     Comments: Scattered - Mac/pap plaques: 2-3 cm BL axilla, upper arms, right index finger.         Result Review  Data Reviewed:{ Labs  Result Review  Imaging  Med Tab  Media :23}                Vital Signs:   /72 (BP Location: Left arm, Patient Position: Sitting, Cuff Size: Adult)   Pulse 105   Ht 188 cm (74\")   Wt 95.1 kg (209 lb 9.6 oz)   SpO2 98%   BMI 26.91 kg/m²   Estimated body mass index is 26.91 kg/m² as calculated from the following:    Height as of this encounter: 188 cm (74\").    Weight as of this encounter: 95.1 kg (209 lb 9.6 oz).        Requested Prescriptions     Signed Prescriptions Disp Refills    clotrimazole-betamethasone (LOTRISONE) 1-0.05 % cream 45 g 1     Sig: Apply 1 Application topically to the appropriate area as directed 2 (Two) Times a Day.       Routine medications provided by this office will also be refilled via pharmacy request.       Current Outpatient Medications:     buPROPion XL (WELLBUTRIN XL) 300 MG 24 hr tablet, Take 1 tablet by mouth Every Morning., Disp: 30 tablet, Rfl: 0    " Continuous Glucose Sensor (Dexcom G7 Sensor) misc, Use Daily. Change every 10 days., Disp: 9 each, Rfl: 1    Dupilumab (Dupixent) 300 MG/2ML solution auto-injector injection, Inject 2 mL under the skin into the appropriate area as directed Every 14 (Fourteen) Days., Disp: 4 mL, Rfl: 11    insulin aspart (NovoLOG FlexPen) 100 UNIT/ML solution pen-injector sc pen, Inject 15 Units under the skin into the appropriate area as directed 3 (Three) Times a Day With Meals., Disp: 45 mL, Rfl: 1    Insulin Glargine-yfgn 100 UNIT/ML solution pen-injector, Inject 38 Units under the skin into the appropriate area as directed Daily., Disp: 100 mL, Rfl: 1    Insulin Pen Needle (B-D ULTRAFINE III SHORT PEN) 31G X 8 MM misc, 1 each 4 (Four) Times a Day., Disp: 400 each, Rfl: 1    lisinopril (PRINIVIL,ZESTRIL) 10 MG tablet, Take 1 tablet by mouth Daily., Disp: 90 tablet, Rfl: 1    LORazepam (ATIVAN) 1 MG tablet, Take 1 tablet by mouth 3 (Three) Times a Day As Needed for Anxiety, Disp: 90 tablet, Rfl: 0    lurasidone (LATUDA) 40 MG tablet tablet, Take 1 tablet by mouth Daily., Disp: 30 tablet, Rfl: 0    Multiple Vitamins-Minerals (MULTIVITAMIN WITH MINERALS) tablet tablet, Take 1 tablet by mouth Daily., Disp: , Rfl:     rosuvastatin (Crestor) 5 MG tablet, Take 1 tablet by mouth Daily., Disp: 90 tablet, Rfl: 1    sildenafil (Viagra) 100 MG tablet, Take 1 tablet by mouth Daily As Needed for Erectile Dysfunction., Disp: 20 tablet, Rfl: 2    traZODone (DESYREL) 150 MG tablet, Take 1 tablet by mouth Every Night., Disp: 30 tablet, Rfl: 2    clotrimazole-betamethasone (LOTRISONE) 1-0.05 % cream, Apply 1 Application topically to the appropriate area as directed 2 (Two) Times a Day., Disp: 45 g, Rfl: 1    fexofenadine (Allegra Allergy) 180 MG tablet, Take 1 tablet by mouth Daily for 10 days., Disp: 10 tablet, Rfl: 0     Assessment and Plan:      Assessment and Plan {CC Problem List  Visit Diagnosis  ROS  Review (Popup)  Health Maintenance   Quality  BestPractice  Medications  SmartSets  SnapShot Encounters  Media :23}     Diagnoses and all orders for this visit:    1. Rash (Primary)  -     Cancel: Ambulatory Referral to Dermatology  -     Ambulatory Referral to Dermatology    Other orders  -     clotrimazole-betamethasone (LOTRISONE) 1-0.05 % cream; Apply 1 Application topically to the appropriate area as directed 2 (Two) Times a Day.  Dispense: 45 g; Refill: 1             New Medications Ordered This Visit   Medications    clotrimazole-betamethasone (LOTRISONE) 1-0.05 % cream     Sig: Apply 1 Application topically to the appropriate area as directed 2 (Two) Times a Day.     Dispense:  45 g     Refill:  1             Follow Up {Instructions Charge Capture  Follow-up Communications :23}     Return if symptoms worsen or fail to improve.      Patient was given instructions and counseling regarding his condition or for health maintenance advice. Please see specific information pulled into the AVS if appropriate.    Dragon disclaimer:   Much of this encounter note is an electronic transcription/translation of spoken language to printed text. The electronic translation of spoken language may permit erroneous, or at times, nonsensical words or phrases to be inadvertently transcribed; Although I have reviewed the note for such errors, some may still exist.     Additional Patient Counseling:       There are no Patient Instructions on file for this visit.

## 2025-03-07 ENCOUNTER — SPECIALTY PHARMACY (OUTPATIENT)
Dept: PHARMACY | Facility: TELEHEALTH | Age: 55
End: 2025-03-07
Payer: COMMERCIAL

## 2025-03-07 NOTE — PROGRESS NOTES
Specialty Pharmacy Patient Management Program  Refill Outreach     Luis was contacted today regarding refills of their medication(s).    Refill Questions      Flowsheet Row Most Recent Value   Changes to allergies? No   Changes to medications? No   New conditions or infections since last clinic visit No   Unplanned office visit, urgent care, ED, or hospital admission in the last 4 weeks  No   How does patient/caregiver feel medication is working? Very good   Financial problems or insurance changes  No   Since the previous refill, were any specialty medication doses or scheduled injections missed or delayed?  No  [Next dose due 3/13]   Does this patient require a clinical escalation to a pharmacist? No            Delivery Questions      Flowsheet Row Most Recent Value   Delivery method UPS   Delivery address verified with patient/caregiver? Yes   Delivery address Home   Other address preferred N/A   Number of medications in delivery 1   Medication(s) being filled and delivered Dupilumab (Dupixent)   Doses left of specialty medications 1   Copay verified? Yes   Copay amount $57.50 **Dupixent [facility]**   Copay form of payment Credit/debit on file   Delivery Date Selection 03/11/25   Signature Required No                 Follow-up: 21 day(s)     Marylou Cohen, Pharmacy Technician  3/7/2025  11:52 EST

## 2025-04-04 ENCOUNTER — SPECIALTY PHARMACY (OUTPATIENT)
Dept: PHARMACY | Facility: TELEHEALTH | Age: 55
End: 2025-04-04
Payer: COMMERCIAL

## 2025-04-04 RX ORDER — DUPILUMAB 300 MG/2ML
300 INJECTION, SOLUTION SUBCUTANEOUS
Qty: 4 ML | Refills: 11 | Status: CANCELLED | OUTPATIENT
Start: 2025-04-04

## 2025-04-04 NOTE — PROGRESS NOTES
Specialty Pharmacy Patient Management Program  Reassessment     Luis Quintana is a 54 y.o. male with nasal polyps and enrolled in the Patient Management program offered by [facility]. A follow-up outreach was conducted, including assessment of continued therapy appropriateness, medication adherence, and side effect incidence and management for Dupixent.     Changes to Insurance Coverage or Financial Support  none    Relevant Past Medical History and Comorbidities  Relevant medical history and concomitant health conditions were discussed with the patient. The patient's chart has been reviewed for relevant past medical history and comorbid health conditions and updated as necessary.   Past Medical History:   Diagnosis Date    Allergic     Anxiety     Colon polyp     Depression     Diabetes mellitus     Erectile dysfunction     Hemorrhoids     Hyperlipidemia     Hypertension     Infectious viral hepatitis     Hep C     Social History     Socioeconomic History    Marital status: Single   Tobacco Use    Smoking status: Former     Current packs/day: 0.00     Average packs/day: 0.5 packs/day for 30.0 years (15.0 ttl pk-yrs)     Types: Cigarettes     Start date: 1989     Quit date: 2014     Years since quittin.2    Smokeless tobacco: Never   Vaping Use    Vaping status: Never Used   Substance and Sexual Activity    Alcohol use: Not Currently     Alcohol/week: 2.0 standard drinks of alcohol     Types: 2 Cans of beer per week     Comment: occassional     Drug use: No    Sexual activity: Not Currently     Partners: Female     Birth control/protection: Natural family planning/Rhythm     Problem list reviewed by Kathie Mcknight RPH on 2025 at  9:54 AM    Allergies  Known allergies and reactions were discussed with the patient. The patient's chart has been reviewed for allergy information and updated as necessary.   Allergies   Allergen Reactions    Penicillins     Sulfa Antibiotics Rash      Reaction happened when patient was a child. He thinks that he developed a rash but cannot definitively remember.      Allergies reviewed by Kathie Mcknight [facility] on 4/4/2025 at  9:54 AM    Relevant Laboratory Values  Lab Results   Component Value Date    GLUCOSE 126 (H) 07/05/2024    CALCIUM 9.6 07/05/2024     07/05/2024    K 5.0 07/05/2024    CO2 25.1 07/05/2024     07/05/2024    BUN 18 07/05/2024    CREATININE 1.02 07/05/2024    BCR 17.6 07/05/2024    ANIONGAP 11.9 07/05/2024     Lab Results   Component Value Date    WBC 7.23 07/05/2024    HGB 14.8 07/05/2024    HCT 45.3 07/05/2024    MCV 95.0 07/05/2024     07/05/2024    INR 1.02 04/18/2017     Lab Value Review  The above lab values have been reviewed; the following specialty medication(s) dose adjustment(s) are recommended: none.    Current Medication List  This medication list has been reviewed with the patient and evaluated for any interactions or necessary modifications/recommendations, and updated to include all prescription medications, OTC medications, and supplements the patient is currently taking. This list reflects what is contained in the patient's profile, which has also been marked as reviewed to communicate to other providers it is the most up to date version of the patient's current medication therapy.     Current Outpatient Medications:     buPROPion XL (WELLBUTRIN XL) 300 MG 24 hr tablet, Take 1 tablet by mouth Every Morning. Need to schedule appt., Disp: 30 tablet, Rfl: 0    clotrimazole-betamethasone (LOTRISONE) 1-0.05 % cream, Apply 1 Application topically to the appropriate area as directed 2 (Two) Times a Day., Disp: 45 g, Rfl: 1    Continuous Glucose Sensor (Dexcom G7 Sensor) misc, Use Daily. Change every 10 days., Disp: 9 each, Rfl: 1    Dupilumab (Dupixent) 300 MG/2ML solution auto-injector injection, Inject 2 mL under the skin into the appropriate area as directed Every 14 (Fourteen) Days., Disp: 4 mL, Rfl: 11     fexofenadine (Allegra Allergy) 180 MG tablet, Take 1 tablet by mouth Daily for 10 days., Disp: 10 tablet, Rfl: 0    insulin aspart (NovoLOG FlexPen) 100 UNIT/ML solution pen-injector sc pen, Inject 15 Units under the skin into the appropriate area as directed 3 (Three) Times a Day With Meals., Disp: 45 mL, Rfl: 1    Insulin Glargine-yfgn 100 UNIT/ML solution pen-injector, Inject 38 Units under the skin into the appropriate area as directed Daily., Disp: 100 mL, Rfl: 1    Insulin Pen Needle (B-D ULTRAFINE III SHORT PEN) 31G X 8 MM misc, 1 each 4 (Four) Times a Day., Disp: 400 each, Rfl: 1    lisinopril (PRINIVIL,ZESTRIL) 10 MG tablet, Take 1 tablet by mouth Daily., Disp: 90 tablet, Rfl: 1    LORazepam (ATIVAN) 1 MG tablet, Take 1 tablet by mouth 3 (Three) Times a Day As Needed for Anxiety, Disp: 90 tablet, Rfl: 0    lurasidone (LATUDA) 40 MG tablet tablet, Take 1 tablet by mouth Daily., Disp: 30 tablet, Rfl: 0    Multiple Vitamins-Minerals (MULTIVITAMIN WITH MINERALS) tablet tablet, Take 1 tablet by mouth Daily., Disp: , Rfl:     rosuvastatin (Crestor) 5 MG tablet, Take 1 tablet by mouth Daily., Disp: 90 tablet, Rfl: 1    sildenafil (Viagra) 100 MG tablet, Take 1 tablet by mouth Daily As Needed for Erectile Dysfunction., Disp: 20 tablet, Rfl: 2    traZODone (DESYREL) 150 MG tablet, Take 1 tablet by mouth Every Night., Disp: 30 tablet, Rfl: 0  Medicines reviewed by Kathie Mcknight [facility] on 4/4/2025 at  9:54 AM    Drug Interactions  none     Adverse Drug Reactions  Medication tolerability: Tolerating with no to minimal ADRs  Medication plan: Continue therapy with normal follow-up  Plan for ADR Management: n/a    Hospitalizations and Urgent Care Since Last Assessment  Hospitalizations or Admissions: none  ED Visits: none  Urgent Office Visits: none     Adherence, Self-Administration, and Current Therapy Problems  Adherence related to the patient's specialty therapy was discussed with the patient. The Adherence segment of  this outreach has been reviewed and updated.          Additional Barriers to Patient Self-Administration: none identified  Methods for Supporting Patient Self-Administration: n/a    Open Medication Therapy Problems  No medication therapy recommendations to display    Goals of Therapy  Goals related to the patient's specialty therapy were discussed with the patient. The Patient Goals segment of this outreach has been reviewed and updated.   Goals Addressed Today        Specialty Pharmacy General Goal      To reduce the size or eliminate nasal polyps, improve breathing and sense of smell              Progress Toward Meeting Patient-Identified Goals of Therapy: On Track  New Patient-Identified Goals, If Applicable:     Progress Toward Meeting Clinical Goals or Therapeutic Targets: On Track  New Clinical Goals or Therapeutic Targets, If Applicable:     Quality of Life Assessment   Quality of Life related to the patient's enrollment in the patient management program and services provided was discussed with the patient. The QOL segment of this outreach has been reviewed and updated.  Quality of Life Improvement Scale: 8-Moderately better    Reassessment Plan & Follow-Up  Medication Therapy Changes: none  Additional Plans, Therapy Recommendations, or Therapy Problems to Be Addressed: none   Pharmacist to perform regular reassessments no more than (6) months from the previous assessment.  Care Coordinator to set up future refill outreaches, coordinate prescription delivery, and escalate clinical questions to pharmacist.     Attestation  I attest the patient was actively involved in and has agreed to the above plan of care. I attest that the specialty medication(s) addressed above are appropriate for the patient based on my reassessment. If the prescribed therapy is at any point deemed not appropriate based on the current or future assessments, a consultation will be initiated with the patient's specialty care provider to  determine the best course of action. The revised plan of therapy will be documented along with any required assessments and/or additional patient education provided.     Electronically signed by Kathie Mcknight RPH, 04/04/25, 9:56 AM EDT.

## 2025-04-07 ENCOUNTER — SPECIALTY PHARMACY (OUTPATIENT)
Dept: PHARMACY | Facility: TELEHEALTH | Age: 55
End: 2025-04-07
Payer: COMMERCIAL

## 2025-04-07 NOTE — PROGRESS NOTES
Specialty Pharmacy Patient Management Program  Refill Outreach     Luis was contacted today regarding refills of their medication(s).    Refill Questions      Flowsheet Row Most Recent Value   Changes to allergies? No   Changes to medications? No   New conditions or infections since last clinic visit No   Unplanned office visit, urgent care, ED, or hospital admission in the last 4 weeks  No   How does patient/caregiver feel medication is working? Very good   Financial problems or insurance changes  No   Since the previous refill, were any specialty medication doses or scheduled injections missed or delayed?  No  [Next dose due 4/9]   Does this patient require a clinical escalation to a pharmacist? No            Delivery Questions      Flowsheet Row Most Recent Value   Delivery method UPS   Delivery address verified with patient/caregiver? Yes   Delivery address Home   Other address preferred N/A   Number of medications in delivery 1   Medication(s) being filled and delivered Dupilumab (Dupixent)   Doses left of specialty medications 0   Copay verified? Yes   Copay amount $57.50 **Dupixent [facility]**   Copay form of payment Credit/debit on file   Delivery Date Selection 04/09/25   Signature Required No   Do you consent to receive electronic handouts?  No                 Follow-up: 21 day(s)     Marylou Cohen, Pharmacy Technician  4/7/2025  14:26 EDT

## 2025-04-15 ENCOUNTER — PATIENT OUTREACH (OUTPATIENT)
Dept: CASE MANAGEMENT | Facility: OTHER | Age: 55
End: 2025-04-15
Payer: COMMERCIAL

## 2025-04-15 NOTE — OUTREACH NOTE
AMBULATORY CASE MANAGEMENT NOTE    Names and Relationships of Patient/Support Persons: Contact: Luis Quintana; Relationship: Self -     Patient Outreach      Call to patient and ECM explained. Pt states he is doing well and denies any needs or concerns at this time. Declines for now      JAYESH JOHNSTON  Ambulatory Case Management    4/15/2025, 09:50 EDT

## 2025-04-25 ENCOUNTER — OFFICE VISIT (OUTPATIENT)
Dept: INTERNAL MEDICINE | Facility: CLINIC | Age: 55
End: 2025-04-25
Payer: COMMERCIAL

## 2025-04-25 VITALS
SYSTOLIC BLOOD PRESSURE: 110 MMHG | OXYGEN SATURATION: 99 % | HEART RATE: 109 BPM | DIASTOLIC BLOOD PRESSURE: 72 MMHG | HEIGHT: 74 IN | BODY MASS INDEX: 26.9 KG/M2 | WEIGHT: 209.6 LBS

## 2025-04-25 DIAGNOSIS — F41.8 DEPRESSION WITH ANXIETY: Primary | ICD-10-CM

## 2025-04-25 DIAGNOSIS — E10.9 TYPE 1 DIABETES MELLITUS WITHOUT COMPLICATION: ICD-10-CM

## 2025-04-25 DIAGNOSIS — R53.83 OTHER FATIGUE: Primary | ICD-10-CM

## 2025-04-25 DIAGNOSIS — Z11.3 SCREEN FOR STD (SEXUALLY TRANSMITTED DISEASE): ICD-10-CM

## 2025-04-25 DIAGNOSIS — E10.9 TYPE 1 DIABETES MELLITUS WITHOUT COMPLICATION: Chronic | ICD-10-CM

## 2025-04-25 NOTE — PROGRESS NOTES
Chief Complaint  Fatigue (No energy x couple months . Patient stated he gets sleepy. Stopped trazodone for a week)     Subjective:      History of Present Illness {CC  Problem List  Visit  Diagnosis   Encounters  Notes  Medications  Labs  Result Review Imaging  Media :23}     Luis Quintana presents to [facility] for:      Fatigue: Recurrent     Fatigue  Symptoms include anorexia, fatigue, rash (seen by dermatolgoy) and weakness.      I'm always tired. Lost some weight.  Symptoms are: chronic.   Onset was 6 to 12 months.   Symptoms occur: daily.  Symptoms include: anorexia, fatigue, rash (seen by dermatolgoy) and weakness.   Other symptom:  Sleepiness  Treatment and/or Medications comments include: Stop taking Trazadone        Wt Readings from Last 3 Encounters:   04/25/25 95.1 kg (209 lb 9.6 oz)   02/25/25 95.1 kg (209 lb 9.6 oz)   12/27/24 95 kg (209 lb 8 oz)   Weight is stable with prior office readings.      When working in the yard - get tried and sleepy. Pollen has also been high.     He has not had sleep study.     He is followed by .     Continues ativan 1 mg tid: refilled consistently last few months.   States only taking 2 daily.   States he has not taken trazodone for 2 weeks.   Currently out of latuda and wants to see if it was causing fatigue.     Taking melatonin 10 mg.     Denies alcohol.     I had referred him to [facility]: per note when he was contacted wanted to see what insurance covered and did not schedule.     Lab work was ordered by in December - not done.     Diabetes:   Novolog states now taking up to 75 units a day    Glargine: 40 units daily: states if increased dose, he is getting low glucose.       Since last visit: went to dermatology: [facility]   Dx: Granuloma annulare   Injected with kenalog.    States was told diabetes related.       Walks in the park.    and goes to dinner with friends.     Answers  "submitted by the patient for this visit:  Problem not listed (Submitted on 4/24/2025)  Chief Complaint: Other medical problem  Reason for appointment: I'm always tired. Lost some weight.  anorexia: Yes  fatigue: Yes  rash: Yes  weakness: Yes  Other symptom: Sleepiness  Onset: 6 to 12 months  Chronicity: chronic  Frequency: daily  Medications tried: Stop taking Trazadone      I have reviewed patient's medical history, any new submitted information provided by patient or medical assistant and updated medical record.      Objective:      Physical Exam  Constitutional:       Appearance: Normal appearance.   Cardiovascular:      Rate and Rhythm: Normal rate and regular rhythm.      Pulses: Normal pulses.      Heart sounds: Normal heart sounds.   Pulmonary:      Effort: Pulmonary effort is normal.      Breath sounds: Normal breath sounds.   Abdominal:      General: Bowel sounds are normal.   Neurological:      Mental Status: He is oriented to person, place, and time.        Result Review  Data Reviewed:{ Labs  Result Review  Imaging  Med Tab  Media :23}                Vital Signs:   /72 (BP Location: Left arm, Patient Position: Sitting, Cuff Size: Adult)   Pulse 109   Ht 188 cm (74\")   Wt 95.1 kg (209 lb 9.6 oz)   SpO2 99%   BMI 26.91 kg/m²   Estimated body mass index is 26.91 kg/m² as calculated from the following:    Height as of this encounter: 188 cm (74\").    Weight as of this encounter: 95.1 kg (209 lb 9.6 oz).        Requested Prescriptions     Signed Prescriptions Disp Refills    insulin aspart (NovoLOG FlexPen) 100 UNIT/ML solution pen-injector sc pen 45 mL 1     Sig: Inject 15 Units under the skin into the appropriate area as directed 3 (Three) Times a Day With Meals. Plus SS: with meals and at bedtime: 251-300: 2 units, 301-350: 4 units, >350: 5 units       Routine medications provided by this office will also be refilled via pharmacy request.       Current Outpatient Medications:     buPROPion " XL (WELLBUTRIN XL) 300 MG 24 hr tablet, Take 1 tablet by mouth Every Morning. Need to schedule appt., Disp: 30 tablet, Rfl: 0    clotrimazole-betamethasone (LOTRISONE) 1-0.05 % cream, Apply 1 Application topically to the appropriate area as directed 2 (Two) Times a Day., Disp: 45 g, Rfl: 1    Continuous Glucose Sensor (Dexcom G7 Sensor) misc, Use Daily. Change every 10 days., Disp: 9 each, Rfl: 1    Dupilumab (Dupixent) 300 MG/2ML solution auto-injector injection, Inject 2 mL under the skin into the appropriate area as directed Every 14 (Fourteen) Days., Disp: 4 mL, Rfl: 11    insulin aspart (NovoLOG FlexPen) 100 UNIT/ML solution pen-injector sc pen, Inject 15 Units under the skin into the appropriate area as directed 3 (Three) Times a Day With Meals. Plus SS: with meals and at bedtime: 251-300: 2 units, 301-350: 4 units, >350: 5 units, Disp: 45 mL, Rfl: 1    Insulin Glargine-yfgn 100 UNIT/ML solution pen-injector, Inject 38 Units under the skin into the appropriate area as directed Daily., Disp: 100 mL, Rfl: 1    Insulin Pen Needle (B-D ULTRAFINE III SHORT PEN) 31G X 8 MM misc, 1 each 4 (Four) Times a Day., Disp: 400 each, Rfl: 1    lisinopril (PRINIVIL,ZESTRIL) 10 MG tablet, Take 1 tablet by mouth Daily., Disp: 90 tablet, Rfl: 1    LORazepam (ATIVAN) 1 MG tablet, Take 1 tablet by mouth 3 (Three) Times a Day As Needed for Anxiety, Disp: 90 tablet, Rfl: 0    lurasidone (LATUDA) 40 MG tablet tablet, Take 1 tablet by mouth Daily., Disp: 30 tablet, Rfl: 0    Multiple Vitamins-Minerals (MULTIVITAMIN WITH MINERALS) tablet tablet, Take 1 tablet by mouth Daily., Disp: , Rfl:     rosuvastatin (Crestor) 5 MG tablet, Take 1 tablet by mouth Daily., Disp: 90 tablet, Rfl: 1    sildenafil (Viagra) 100 MG tablet, Take 1 tablet by mouth Daily As Needed for Erectile Dysfunction., Disp: 20 tablet, Rfl: 2    fexofenadine (Allegra Allergy) 180 MG tablet, Take 1 tablet by mouth Daily for 10 days., Disp: 10 tablet, Rfl: 0     Assessment  and Plan:      Assessment and Plan {CC Problem List  Visit Diagnosis  ROS  Review (Popup)  Health Maintenance  Quality  BestPractice  Medications  SmartSets  SnapShot Encounters  Media :23}     Diagnoses and all orders for this visit:    1. Other fatigue (Primary)  -     CBC w AUTO Differential  -     Vitamin B12  -     Ferritin    2. Screen for STD (sexually transmitted disease)  -     HIV-1 / O / 2 Ag / Antibody  -     Chlamydia trachomatis, Neisseria gonorrhoeae, PCR - Urine, Urine, Clean Catch  -     RPR Qualitative with Reflex to Quant    3. Type 1 diabetes mellitus without complication  Overview:  Dx:   He has a Dexcom G7   CGM since 2019    Renal protection: lisinopril     Assessment & Plan:  Your last A1C (3 month average) =   Lab Results   Component Value Date    HGBA1C 5.8 (H) 04/25/2025      Your diabetes is Controlled.    Plan    [x] Continue same treatment: insurance no longer covering semglee, change to same dose of glargine.     [] Will modify dose/ treatment if needed based upon lab results today.     [] Change:       Diet: low carbohydrate, low sugar.     ADA Website for diabetic food choices.   https://diabetes.org/food-nutrition/eating-healthy    The American Diabetes Association recommends an A1C of less than 7%.  A1C Average Levels Blood Sugar:   6%  126 mg/dL  7%  154 mg/dL  8%  183 mg/dL  9%  212 mg/dL  10%  240 mg/dL  11%  269 mg/dL  12%  298 mg/dL    Glucose goals for many adults with diabetes  Blood sugar before meals  mg/dL  Blood sugar 1-2 hours after the start of a meal Less than 180 mg/dL A1C Less than 7%    Eye Health:   You need a diabetic eye exam yearly.   Please have a copy of the note faxed to my office.   Fax: [phone]    Foot Health:   You need a diabetic foot exam yearly.   Check your feet routinely for any wounds.   You should always check your shoes for any debris that could cause a wound.          Patient is being treated for diabetes.     Continuation  Note:     [x] Patient is being seen every 6 months for diabetes   [x] CGM continues to be use to improve glycemic control   []      Lab Results   Component Value Date    HGBA1C 5.8 (H) 04/25/2025         Orders:  -     insulin aspart (NovoLOG FlexPen) 100 UNIT/ML solution pen-injector sc pen; Inject 15 Units under the skin into the appropriate area as directed 3 (Three) Times a Day With Meals. Plus SS: with meals and at bedtime: 251-300: 2 units, 301-350: 4 units, >350: 5 units  Dispense: 45 mL; Refill: 1    Other orders  -     Comprehensive Metabolic Panel  -     Hemoglobin A1c  -     TSH Rfx On Abnormal To Free T4      Declines referral to  with [facility].   Would advise him to be evaluated by another provider.   Will check lab work today and update him.            New Medications Ordered This Visit   Medications    insulin aspart (NovoLOG FlexPen) 100 UNIT/ML solution pen-injector sc pen     Sig: Inject 15 Units under the skin into the appropriate area as directed 3 (Three) Times a Day With Meals. Plus SS: with meals and at bedtime: 251-300: 2 units, 301-350: 4 units, >350: 5 units     Dispense:  45 mL     Refill:  1     Dispense 3 month supply         Today's Date: 4/28/2025    Insulin Dosing Instructions    YOUR INSULIN DOSING HAS BEEN CHANGED TO: Before Breakfast Before Lunch Before Evening Meal Bedtime   Long-Acting Insulins Basaglar (Glargine)  Lantus (Glargine)  Levemir (Detemir)  Semglee (Glargine)  Toujeo (Glargine)  Tresiba (Degludec) 40 units daily      Rapid-Acting Insulins Admelog (Lispro)  Apidra (Glulisine)  Flasp (Aspart)  Humalog (Lispro)  Lyumjev (Lispro)  Novolog (Aspart) 15 15 15 10                           Sliding Scale for Rapid-acting   or regular insulin based on blood sugar 151-200 + + + +    201-250 + + + +    251-300 2 2 2 2    301-350 4 4 4 4    >350 5 5 5 5              Follow Up {Instructions Charge Capture  Follow-up Communications :23}     Return if symptoms worsen or fail to  improve, for Next scheduled follow up.      Patient was given instructions and counseling regarding his condition or for health maintenance advice. Please see specific information pulled into the AVS if appropriate.    Dragon disclaimer:   Much of this encounter note is an electronic transcription/translation of spoken language to printed text. The electronic translation of spoken language may permit erroneous, or at times, nonsensical words or phrases to be inadvertently transcribed; Although I have reviewed the note for such errors, some may still exist.     Additional Patient Counseling:       There are no Patient Instructions on file for this visit.

## 2025-04-28 LAB
ALBUMIN SERPL-MCNC: 4.8 G/DL (ref 3.8–4.9)
ALP SERPL-CCNC: 75 IU/L (ref 44–121)
ALT SERPL-CCNC: 22 IU/L (ref 0–44)
AST SERPL-CCNC: 25 IU/L (ref 0–40)
BASOPHILS # BLD AUTO: 0 X10E3/UL (ref 0–0.2)
BASOPHILS NFR BLD AUTO: 1 %
BILIRUB SERPL-MCNC: 0.5 MG/DL (ref 0–1.2)
BUN SERPL-MCNC: 19 MG/DL (ref 6–24)
BUN/CREAT SERPL: 23 (ref 9–20)
C TRACH RRNA SPEC QL NAA+PROBE: NEGATIVE
CALCIUM SERPL-MCNC: 10.2 MG/DL (ref 8.7–10.2)
CHLORIDE SERPL-SCNC: 99 MMOL/L (ref 96–106)
CO2 SERPL-SCNC: 21 MMOL/L (ref 20–29)
CREAT SERPL-MCNC: 0.84 MG/DL (ref 0.76–1.27)
EGFRCR SERPLBLD CKD-EPI 2021: 105 ML/MIN/1.73
EOSINOPHIL # BLD AUTO: 0.3 X10E3/UL (ref 0–0.4)
EOSINOPHIL NFR BLD AUTO: 4 %
ERYTHROCYTE [DISTWIDTH] IN BLOOD BY AUTOMATED COUNT: 11.9 % (ref 11.6–15.4)
FERRITIN SERPL-MCNC: 70 NG/ML (ref 30–400)
GLOBULIN SER CALC-MCNC: 2.3 G/DL (ref 1.5–4.5)
GLUCOSE SERPL-MCNC: 118 MG/DL (ref 70–99)
HBA1C MFR BLD: 5.8 % (ref 4.8–5.6)
HCT VFR BLD AUTO: 46.5 % (ref 37.5–51)
HGB BLD-MCNC: 15 G/DL (ref 13–17.7)
HIV 1+2 AB+HIV1 P24 AG SERPL QL IA: NON REACTIVE
IMM GRANULOCYTES # BLD AUTO: 0 X10E3/UL (ref 0–0.1)
IMM GRANULOCYTES NFR BLD AUTO: 0 %
LYMPHOCYTES # BLD AUTO: 1.5 X10E3/UL (ref 0.7–3.1)
LYMPHOCYTES NFR BLD AUTO: 23 %
MCH RBC QN AUTO: 31 PG (ref 26.6–33)
MCHC RBC AUTO-ENTMCNC: 32.3 G/DL (ref 31.5–35.7)
MCV RBC AUTO: 96 FL (ref 79–97)
MONOCYTES # BLD AUTO: 0.6 X10E3/UL (ref 0.1–0.9)
MONOCYTES NFR BLD AUTO: 9 %
N GONORRHOEA RRNA SPEC QL NAA+PROBE: NEGATIVE
NEUTROPHILS # BLD AUTO: 4.2 X10E3/UL (ref 1.4–7)
NEUTROPHILS NFR BLD AUTO: 63 %
PLATELET # BLD AUTO: 312 X10E3/UL (ref 150–450)
POTASSIUM SERPL-SCNC: 4.6 MMOL/L (ref 3.5–5.2)
PROT SERPL-MCNC: 7.1 G/DL (ref 6–8.5)
RBC # BLD AUTO: 4.84 X10E6/UL (ref 4.14–5.8)
RPR SER QL: NON REACTIVE
SODIUM SERPL-SCNC: 140 MMOL/L (ref 134–144)
TSH SERPL DL<=0.005 MIU/L-ACNC: 0.83 UIU/ML (ref 0.45–4.5)
VIT B12 SERPL-MCNC: 1542 PG/ML (ref 232–1245)
WBC # BLD AUTO: 6.6 X10E3/UL (ref 3.4–10.8)

## 2025-04-28 RX ORDER — INSULIN ASPART 100 [IU]/ML
15 INJECTION, SOLUTION INTRAVENOUS; SUBCUTANEOUS
Qty: 45 ML | Refills: 1 | Status: SHIPPED | OUTPATIENT
Start: 2025-04-28

## 2025-04-28 NOTE — ASSESSMENT & PLAN NOTE
Your last A1C (3 month average) =   Lab Results   Component Value Date    HGBA1C 5.8 (H) 04/25/2025      Your diabetes is Controlled.    Plan    [x] Continue same treatment: insurance no longer covering semglee, change to same dose of glargine.     [] Will modify dose/ treatment if needed based upon lab results today.     [] Change:       Diet: low carbohydrate, low sugar.     ADA Website for diabetic food choices.   https://diabetes.org/food-nutrition/eating-healthy    The American Diabetes Association recommends an A1C of less than 7%.  A1C Average Levels Blood Sugar:   6%  126 mg/dL  7%  154 mg/dL  8%  183 mg/dL  9%  212 mg/dL  10%  240 mg/dL  11%  269 mg/dL  12%  298 mg/dL    Glucose goals for many adults with diabetes  Blood sugar before meals  mg/dL  Blood sugar 1-2 hours after the start of a meal Less than 180 mg/dL A1C Less than 7%    Eye Health:   You need a diabetic eye exam yearly.   Please have a copy of the note faxed to my office.   Fax: [phone]    Foot Health:   You need a diabetic foot exam yearly.   Check your feet routinely for any wounds.   You should always check your shoes for any debris that could cause a wound.          Patient is being treated for diabetes.     Continuation Note:     [x] Patient is being seen every 6 months for diabetes   [x] CGM continues to be use to improve glycemic control   []      Lab Results   Component Value Date    HGBA1C 5.8 (H) 04/25/2025

## 2025-04-29 DIAGNOSIS — F41.8 DEPRESSION WITH ANXIETY: Primary | ICD-10-CM

## 2025-04-29 RX ORDER — LISINOPRIL 10 MG/1
10 TABLET ORAL DAILY
Qty: 90 TABLET | Refills: 1 | Status: SHIPPED | OUTPATIENT
Start: 2025-04-29

## 2025-05-27 ENCOUNTER — SPECIALTY PHARMACY (OUTPATIENT)
Dept: PHARMACY | Facility: TELEHEALTH | Age: 55
End: 2025-05-27
Payer: COMMERCIAL

## 2025-05-27 NOTE — PROGRESS NOTES
Specialty Pharmacy Patient Management Program  Refill Outreach     Luis was contacted today regarding refills of their medication(s).    Refill Questions      Flowsheet Row Most Recent Value   Changes to allergies? No   Changes to medications? No   New conditions or infections since last clinic visit No   Unplanned office visit, urgent care, ED, or hospital admission in the last 4 weeks  No   How does patient/caregiver feel medication is working? Very good   Financial problems or insurance changes  No   Since the previous refill, were any specialty medication doses or scheduled injections missed or delayed?  Yes    If yes, please provide the amount 2 doses (5/7 and 5/21)   Why were doses missed? Patient is in General Specialty Program so his refill coordination did not populate on the new Inflammatory Report that we are required to run.   Does this patient require a clinical escalation to a pharmacist? Yes             Delivery Questions      Flowsheet Row Most Recent Value   Delivery method UPS   Delivery address verified with patient/caregiver? Yes   Delivery address Home   Other address preferred N/A   Number of medications in delivery 1   Medication(s) being filled and delivered Dupilumab (Dupixent)   Doses left of specialty medications 0   Copay verified? Yes   Copay amount $57.50 **Dupixent [facility]**   Copay form of payment Credit/debit on file   Delivery Date Selection 05/28/25   Signature Required No                 Follow-up: 21 day(s)     Marylou Cohen, Pharmacy Technician  5/27/2025  09:01 EDT

## 2025-05-28 ENCOUNTER — TELEPHONE (OUTPATIENT)
Dept: INTERNAL MEDICINE | Facility: CLINIC | Age: 55
End: 2025-05-28
Payer: COMMERCIAL

## 2025-05-28 RX ORDER — AZITHROMYCIN 250 MG/1
TABLET, FILM COATED ORAL
Qty: 6 TABLET | Refills: 0 | Status: SHIPPED | OUTPATIENT
Start: 2025-05-28

## 2025-05-28 NOTE — TELEPHONE ENCOUNTER
Caller: Luis Quintana    Relationship to patient: Self    Best call back number: [phone]    Patient is needing: PATIENT IS REQUESTING ANTIBIOTICS.  HE STATES IT STARTED WITH ALLERGIES, BUT NOW HE HAS A SINUS INFECTION.  HE STATES HE HAS STUFFINESS, HEADACHE, SUGAR IS UP, YUCKINESS, GREEN DISCHARGE.   HE WANTS IT SENT TO [facility] IN [place], [place]  [phone] [facility].     PLEASE LET THE PATIENT KNOW.      PATIENT STATES HE NEEDS TO HAVE HIS INSULIN PRESCRIPTION REVISED.  HE STATES HE TALKED TO MARLEY GUILLERMO ABOUT INCREASING THE NOVOLOG FLEX PEN, BUT THE PHARMACY STATES IT WAS NOT INCREASED.  PLEASE ADVISE THE PATIENT ON THIS AS WELL.

## 2025-05-28 NOTE — TELEPHONE ENCOUNTER
Patient states for the past 5-6 days has been feeling very stuffy , green mucus and headache states sugar is up too. He thinks he might have a sinus infection.     Advised an appointment but he would like something called into his local pharmacy.     Please advise

## 2025-05-28 NOTE — TELEPHONE ENCOUNTER
Hub staff attempted to follow warm transfer process and was unsuccessful     Caller: Luis Quintana    Relationship to patient: Self    Best call back number: [phone]     Patient is needing: AN UPDATE AND WOULD LIKE A CALLBACK

## 2025-06-02 DIAGNOSIS — E10.9 TYPE 1 DIABETES MELLITUS WITHOUT COMPLICATION: Chronic | ICD-10-CM

## 2025-06-02 RX ORDER — INSULIN ASPART 100 [IU]/ML
15 INJECTION, SOLUTION INTRAVENOUS; SUBCUTANEOUS
Qty: 45 ML | Refills: 1 | Status: SHIPPED | OUTPATIENT
Start: 2025-06-02 | End: 2025-06-03 | Stop reason: SDUPTHER

## 2025-06-02 RX ORDER — ACYCLOVIR 400 MG/1
1 TABLET ORAL DAILY
Qty: 9 EACH | Refills: 1 | Status: SHIPPED | OUTPATIENT
Start: 2025-06-02

## 2025-06-02 NOTE — TELEPHONE ENCOUNTER
Caller: Luis Quintana    Relationship: Self    Best call back number: [phone]    What medication are you requesting: insulin aspart (NovoLOG FlexPen) 100 UNIT/ML solution pen-injector sc pen     Have you had these symptoms before:    [x] Yes  [] No    Have you been treated for these symptoms before:   [x] Yes  [] No    If a prescription is needed, what is your preferred pharmacy and phone number: [facility])     Additional notes: STATED THAT THEY HAD REQUESTED THE MEDICATION DOSE BE INCREASED AT THEIR LAST VISIT. STATED THAT THEY WOULD LIKE FOR THE PRESCRIPTION TO BE GIVEN FOR 80 UNITS A DAY INSTEAD OF THE 45 UNITS THEY CURRENTLY GET. STATED THAT AT THE CURRENT RATE THEY HAVE TO TAKE THE MEDICATION AT THEY WILL BE OUT BEFORE THEIR NEXT REFILL SO THEY NEED TO GET THIS AS SOON AS THEY CAN. PLEASE CALL AND ADVISE

## 2025-06-03 DIAGNOSIS — E10.9 TYPE 1 DIABETES MELLITUS WITHOUT COMPLICATION: Chronic | ICD-10-CM

## 2025-06-03 RX ORDER — INSULIN ASPART 100 [IU]/ML
15 INJECTION, SOLUTION INTRAVENOUS; SUBCUTANEOUS
Qty: 45 ML | Refills: 1 | Status: SHIPPED | OUTPATIENT
Start: 2025-06-03

## 2025-06-03 NOTE — PROGRESS NOTES
Pharmacy contacted office regarding refill of insulin.     Contacted patient: states he had to use more when was ill with sinus infection.      Last note did not have 10 units bedtime dose reflected in prescription.     Patient states using more short acting but states can't increase long acting due to hypoglycemia even if he decreases short acting dose.     Given that: agrees to referral to endocrine for further evaluation/ management.     WCN      Last note:     Today's Date: 4/28/2025     Insulin Dosing Instructions             YOUR INSULIN DOSING HAS BEEN CHANGED TO: Before Breakfast Before Lunch Before Evening Meal Bedtime   Long-Acting Insulins Basaglar (Glargine)  Lantus (Glargine)  Levemir (Detemir)  Semglee (Glargine)  Toujeo (Glargine)  Tresiba (Degludec) 40 units daily         Rapid-Acting Insulins Admelog (Lispro)  Apidra (Glulisine)  Flasp (Aspart)  Humalog (Lispro)  Lyumjev (Lispro)  Novolog (Aspart) 15 15 15 10                                             Sliding Scale for Rapid-acting   or regular insulin based on blood sugar 151-200 + + + +    201-250 + + + +    251-300 2 2 2 2    301-350 4 4 4 4    >350 5 5 5 5

## 2025-06-13 ENCOUNTER — OFFICE VISIT (OUTPATIENT)
Dept: ENDOCRINOLOGY | Age: 55
End: 2025-06-13
Payer: COMMERCIAL

## 2025-06-13 VITALS
BODY MASS INDEX: 27.75 KG/M2 | OXYGEN SATURATION: 99 % | WEIGHT: 216.2 LBS | SYSTOLIC BLOOD PRESSURE: 122 MMHG | DIASTOLIC BLOOD PRESSURE: 86 MMHG | HEART RATE: 109 BPM | HEIGHT: 74 IN

## 2025-06-13 DIAGNOSIS — E10.9 TYPE 1 DIABETES MELLITUS WITHOUT COMPLICATION: Primary | Chronic | ICD-10-CM

## 2025-06-13 DIAGNOSIS — R53.82 CHRONIC FATIGUE: ICD-10-CM

## 2025-06-13 RX ORDER — INSULIN ASPART 100 [IU]/ML
15 INJECTION, SOLUTION INTRAVENOUS; SUBCUTANEOUS
Qty: 90 ML | Refills: 1 | Status: SHIPPED | OUTPATIENT
Start: 2025-06-13

## 2025-06-13 NOTE — PROGRESS NOTES
Referring provider: Delia Barba*     Chief complaint/Reason for consult: T1DM    HPI:   - 54 year old male here for management of diabetes mellitus type 1  - Has had diabetes since age 23  - No known complications to date  - Is currently taking Semglee 38 units daily and Novolog 15 units qac  - Denies hypoglycemia    The following portions of the patient's history were reviewed and updated as appropriate: allergies, current medications, past family history, past medical history, past social history, past surgical history, and problem list.      Objective     Vitals:    06/13/25 1240   BP: 122/86   Pulse: 109   SpO2: 99%        Physical Exam  Vitals reviewed.   Constitutional:       Appearance: Normal appearance.   HENT:      Head: Normocephalic and atraumatic.   Eyes:      General: No scleral icterus.  Pulmonary:      Effort: Pulmonary effort is normal. No respiratory distress.   Neurological:      Mental Status: He is alert.      Gait: Gait normal.   Psychiatric:         Mood and Affect: Mood normal.         Behavior: Behavior normal.         Thought Content: Thought content normal.         Judgment: Judgment normal.     CGM interpretation  Dates reviewed:  5/31-6/13/25  Data:  Avg of 134, 1% very high, 11% high, 84% time in range, 4% low, 1% very low  Interpretation:  Reasonable glycemic control    Assessment & Plan   T1DM  - Cont. Semglee 38 units daily and Novolog 15 units qac    2. Fatigue  - Will check testosterone    - Return to clinic in 6 months

## 2025-06-14 ENCOUNTER — DOCUMENTATION (OUTPATIENT)
Dept: ENDOCRINOLOGY | Age: 55
End: 2025-06-14
Payer: COMMERCIAL

## 2025-06-14 DIAGNOSIS — E10.9 TYPE 1 DIABETES MELLITUS WITHOUT COMPLICATION: Primary | ICD-10-CM

## 2025-06-19 ENCOUNTER — SPECIALTY PHARMACY (OUTPATIENT)
Dept: PHARMACY | Facility: TELEHEALTH | Age: 55
End: 2025-06-19
Payer: COMMERCIAL

## 2025-06-19 NOTE — PROGRESS NOTES
Specialty Pharmacy Patient Management Program  Refill Outreach     Luis was contacted today regarding refills of their medication(s).    Refill Questions      Flowsheet Row Most Recent Value   Changes to allergies? No   Changes to medications? No   New conditions or infections since last clinic visit No   Unplanned office visit, urgent care, ED, or hospital admission in the last 4 weeks  No   How does patient/caregiver feel medication is working? Very good   Financial problems or insurance changes  No   Since the previous refill, were any specialty medication doses or scheduled injections missed or delayed?  No   If yes, please provide the amount N/A   Why were doses missed? N/A   Does this patient require a clinical escalation to a pharmacist? No            Delivery Questions      Flowsheet Row Most Recent Value   Delivery method UPS   Delivery address verified with patient/caregiver? Yes   Delivery address Home   Other address preferred N/A   Number of medications in delivery 1   Medication(s) being filled and delivered Dupilumab (Dupixent)   Doses left of specialty medications 0   Copay verified? Yes   Copay amount $57.50 Dupixent CCOF   Copay form of payment Credit/debit on file  [Dupixent CCOF]   Delivery Date Selection 06/24/25   Signature Required No   Do you consent to receive electronic handouts?  Yes                 Follow-up: 21 day(s)     Hetal Hassan, Pharmacy Technician  6/19/2025  15:46 EDT

## 2025-06-20 ENCOUNTER — LAB (OUTPATIENT)
Dept: LAB | Facility: HOSPITAL | Age: 55
End: 2025-06-20
Payer: COMMERCIAL

## 2025-06-20 DIAGNOSIS — E10.9 TYPE 1 DIABETES MELLITUS WITHOUT COMPLICATION: Chronic | ICD-10-CM

## 2025-06-20 LAB
FSH SERPL-ACNC: 6.06 MIU/ML
HBA1C MFR BLD: 5.7 % (ref 4.8–5.6)
HCT VFR BLD AUTO: 46.2 % (ref 37.5–51)
LH SERPL-ACNC: 3.88 MIU/ML
PROLACTIN SERPL-MCNC: 13.4 NG/ML (ref 4.04–15.2)
TESTOST SERPL-MCNC: 777 NG/DL (ref 193–740)

## 2025-06-20 PROCEDURE — 36415 COLL VENOUS BLD VENIPUNCTURE: CPT

## 2025-06-20 PROCEDURE — 84403 ASSAY OF TOTAL TESTOSTERONE: CPT

## 2025-06-20 PROCEDURE — 85014 HEMATOCRIT: CPT

## 2025-06-20 PROCEDURE — 83002 ASSAY OF GONADOTROPIN (LH): CPT

## 2025-06-20 PROCEDURE — 83001 ASSAY OF GONADOTROPIN (FSH): CPT

## 2025-06-20 PROCEDURE — 83036 HEMOGLOBIN GLYCOSYLATED A1C: CPT

## 2025-06-20 PROCEDURE — 84146 ASSAY OF PROLACTIN: CPT

## 2025-06-26 DIAGNOSIS — E78.2 MIXED HYPERLIPIDEMIA: Chronic | ICD-10-CM

## 2025-06-26 RX ORDER — ROSUVASTATIN CALCIUM 5 MG/1
5 TABLET, COATED ORAL DAILY
Qty: 90 TABLET | Refills: 1 | Status: SHIPPED | OUTPATIENT
Start: 2025-06-26

## 2025-06-27 ENCOUNTER — OFFICE VISIT (OUTPATIENT)
Dept: INTERNAL MEDICINE | Facility: CLINIC | Age: 55
End: 2025-06-27
Payer: COMMERCIAL

## 2025-06-27 VITALS
BODY MASS INDEX: 26.95 KG/M2 | WEIGHT: 210 LBS | HEIGHT: 74 IN | DIASTOLIC BLOOD PRESSURE: 70 MMHG | HEART RATE: 88 BPM | OXYGEN SATURATION: 100 % | SYSTOLIC BLOOD PRESSURE: 100 MMHG

## 2025-06-27 DIAGNOSIS — E10.9 TYPE 1 DIABETES MELLITUS WITHOUT COMPLICATION: Primary | Chronic | ICD-10-CM

## 2025-06-27 DIAGNOSIS — I10 ESSENTIAL HYPERTENSION: Chronic | ICD-10-CM

## 2025-06-27 DIAGNOSIS — Z23 NEED FOR VACCINATION: ICD-10-CM

## 2025-06-27 DIAGNOSIS — B18.2 CHRONIC HEPATITIS C WITHOUT HEPATIC COMA: Chronic | ICD-10-CM

## 2025-06-27 DIAGNOSIS — E78.2 MIXED HYPERLIPIDEMIA: Chronic | ICD-10-CM

## 2025-06-27 NOTE — PROGRESS NOTES
Chief Complaint  Annual Exam     Subjective:      History of Present Illness {CC  Problem List  Visit  Diagnosis   Encounters  Notes  Medications  Labs  Result Review Imaging  Media :23}     Luis Quintana presents to [facility] for:  annual exam     Hypertension  This is a chronic problem. The problem is unchanged. The problem is controlled. Pertinent negatives include no chest pain or peripheral edema. Risk factors for coronary artery disease include diabetes mellitus, dyslipidemia and male gender. Current antihypertension treatment includes ACE inhibitors.   The current treatment provides significant improvement. There are no compliance problems.       Diabetes  He presents for his follow-up diabetic visit. He has type 1 diabetes mellitus. Pertinent negatives for diabetes include no chest pain, no polydipsia, no polyphagia and no polyuria. Current diabetic treatment includes insulin injections. He is following a diabetic diet. An ACE inhibitor/angiotensin II receptor blocker is being taken.     Referred to endocrine because he was using more insulin that prescribed and stated he could not increase long acting.    6/3/25:   Last A1C 5.7%   States they increased his insulin.      Depression/ Anxiety  : latuda, ativan, wellbutrin      Dr Rai Lozada  System   4/29/25: Referred to    Then when they called - per note, he wanted to think about it.   [facility] did not want virtual.   [facility] was stopped on wellbutrin and latuda.     Hx Hepatitis C   States not drinking now.   [facility] has follow up with GI in August.     Recurrent sinusitis: Recently seen by garry Bob.       Luis is here for coordination of medical care, to discuss health maintenance, disease prevention as well as to follow up on medical problems.     Patient Care Team:  Delia Gresham III, NP-C as PCP - General (Internal Medicine)  Juaquin Riggs MD  (Psychiatry)  Luipllo Patino MD as Consulting Physician (Gastroenterology)  Afshin Mike MD (Inactive) as Consulting Physician (Otolaryngology)  Gary Oakley Jr., MD as Consulting Physician (Otolaryngology)      He states that his activity level is moderate.   Exercise:     His diet is diabetic.     Health and Weight:   Weight trend is   Wt Readings from Last 4 Encounters:   06/27/25 95.3 kg (210 lb)   06/13/25 98.1 kg (216 lb 3.2 oz)   04/25/25 95.1 kg (209 lb 9.6 oz)   02/25/25 95.1 kg (209 lb 9.6 oz)       Mental Health:   PHQ-2 Depression Screening  Little interest or pleasure in doing things? Not at all   Feeling down, depressed, or hopeless? Over half   PHQ-2 Total Score 2      Blood Pressure:   BP Readings from Last 3 Encounters:   06/27/25 100/70   06/13/25 122/86   04/25/25 110/72        Cholesterol Screen:   Most men start screen at 35, if you have family history or comorbidities it may be screen earlier.     Risk Evaluation:  1. Cardiovascular risk factors: hypertension, hyperlipidemia, diabetes mellitus, male gender.  2. Diabetes risk factors: patient has diabetes and being treated.   3. Cancer risk factors: diabetes.    Prevention:   Cholesterol and glucose screen due.   Hepatitis  C screen: [] Due  [] Completed : hx hepatitis C  Asks to recheck - states dermatology was not sterile ?     Colon Cancer Screen:   Patient's last colonoscopy was:    Last Completed Colonoscopy            Needs Review       COLORECTAL CANCER SCREENING (COLONOSCOPY - Every 5 Years) Tentatively due on 10/31/2029      10/31/2024  Colonoscopy    10/31/2024  Surgical Procedure: COLONOSCOPY    04/27/2017  COLONOSCOPY                            Genital/ Urinary Health:   He voids without difficulty.      Testicular cancer Screen:   Testicular self exams recommended once a month.   Advised that any firm testicular nodules to be reported immediately.    Prostate cancer screening:  Lab Results   Component Value Date     PSA 0.243 07/05/2024      Family history: [x] None    [] Yes:     Lung Cancer Screen:   Tobacco Use: Medium Risk (6/27/2025)    Patient History     Smoking Tobacco Use: Former     Smokeless Tobacco Use: Never     Passive Exposure: Not on file             Vaccines Due:   [] Flu     [] Tdap   [] Prevnar 20    [x] Shingrix (shingles: series of 2)   [] COVID (booster)    []   []Declines vaccines       Eye exams: advise routine and for any vision changes.   Always where sunglass when outside with UV protection.   States he his behind on follow up - scheduled with Dr Tracy in September.       Advise routine dental visits for oral health.     Skin Cancer:   Advise daily use of sunscreen.   Routine self assessment of your skin, report any changes.   Dr Cuevas: recently : June 6th   States had some skin tags removed.                I have reviewed patient's medical history, any new submitted information provided by patient or medical assistant and updated medical record.      Objective:      Physical Exam  Vitals reviewed.   Constitutional:       Appearance: Normal appearance. He is well-developed.   Neck:      Thyroid: No thyromegaly.   Cardiovascular:      Rate and Rhythm: Normal rate and regular rhythm.      Pulses: Normal pulses.      Heart sounds: Normal heart sounds.   Pulmonary:      Effort: Pulmonary effort is normal.      Breath sounds: Normal breath sounds.      Comments: E/U   Abdominal:      General: Bowel sounds are normal.   Musculoskeletal:         General: Normal range of motion.      Cervical back: Normal range of motion and neck supple.   Lymphadenopathy:      Cervical: No cervical adenopathy.   Skin:     General: Skin is warm and dry.      Capillary Refill: Capillary refill takes 2 to 3 seconds.          Neurological:      Mental Status: He is alert and oriented to person, place, and time.   Psychiatric:         Mood and Affect: Mood normal.         Behavior: Behavior normal. Behavior is cooperative.     "     Thought Content: Thought content normal.         Judgment: Judgment normal.        Result Review  Data Reviewed:{ Labs  Result Review  Imaging  Med Tab  Media :23}     The following data was reviewed by: Delia Gresham III, NP-C on 06/27/2025  Common labs          4/25/2025    15:02 6/20/2025    08:47   Common Labs   Glucose 118     BUN 19     Creatinine 0.84     Sodium 140     Potassium 4.6     Chloride 99     Calcium 10.2     Albumin 4.8     Total Bilirubin 0.5     Alkaline Phosphatase 75     AST (SGOT) 25     ALT (SGPT) 22     WBC 6.6     Hemoglobin 15.0     Hematocrit 46.5  46.2    Platelets 312     Hemoglobin A1C 5.8  5.70             Vital Signs:   /70 (BP Location: Left arm, Patient Position: Sitting, Cuff Size: Adult)   Pulse 88   Ht 188 cm (74\")   Wt 95.3 kg (210 lb)   SpO2 100%   BMI 26.96 kg/m²   Estimated body mass index is 26.96 kg/m² as calculated from the following:    Height as of this encounter: 188 cm (74\").    Weight as of this encounter: 95.3 kg (210 lb).        Requested Prescriptions      No prescriptions requested or ordered in this encounter       Routine medications provided by this office will also be refilled via pharmacy request.       Current Outpatient Medications:     Continuous Glucose Sensor (Dexcom G7 Sensor) misc, Use Daily. Change every 10 days., Disp: 9 each, Rfl: 1    Dupilumab (Dupixent) 300 MG/2ML solution auto-injector injection, Inject 2 mL under the skin into the appropriate area as directed Every 14 (Fourteen) Days., Disp: 4 mL, Rfl: 11    insulin aspart (NovoLOG FlexPen) 100 UNIT/ML solution pen-injector sc pen, Inject 15 Units under the skin into the appropriate area as directed 3 (Three) Times a Day With Meals. Plus additional insulin for high blood sugars up to 100 units per day., Disp: 90 mL, Rfl: 1    Insulin Glargine-yfgn 100 UNIT/ML solution pen-injector, Inject 38 Units under the skin into the appropriate area as directed Daily., " Disp: 100 mL, Rfl: 1    Insulin Pen Needle (B-D ULTRAFINE III SHORT PEN) 31G X 8 MM misc, 1 each 4 (Four) Times a Day., Disp: 400 each, Rfl: 1    lisinopril (PRINIVIL,ZESTRIL) 10 MG tablet, Take 1 tablet by mouth Daily., Disp: 90 tablet, Rfl: 1    LORazepam (ATIVAN) 1 MG tablet, Take 1 tablet by mouth 3 (Three) Times a Day As Needed for Anxiety, Disp: 90 tablet, Rfl: 0    Multiple Vitamins-Minerals (MULTIVITAMIN WITH MINERALS) tablet tablet, Take 1 tablet by mouth Daily., Disp: , Rfl:     rosuvastatin (Crestor) 5 MG tablet, Take 1 tablet by mouth Daily., Disp: 90 tablet, Rfl: 1    sildenafil (Viagra) 100 MG tablet, Take 1 tablet by mouth Daily As Needed for Erectile Dysfunction., Disp: 20 tablet, Rfl: 2    fexofenadine (Allegra Allergy) 180 MG tablet, Take 1 tablet by mouth Daily for 10 days., Disp: 10 tablet, Rfl: 0     Assessment and Plan:      Assessment and Plan {CC Problem List  Visit Diagnosis  ROS  Review (Popup)  Health Maintenance  Quality  BestPractice  Medications  SmartSets  SnapShot Encounters  Media :23}     Diagnoses and all orders for this visit:    1. Type 1 diabetes mellitus without complication (Primary)  Overview:  Dx:   He has a Dexcom G7   CGM since 2019    Renal protection: lisinopril     Assessment & Plan:  Your last A1C (3 month average) =   Lab Results   Component Value Date    HGBA1C 5.70 (H) 06/20/2025      Your diabetes is Controlled.    Plan    [x] Continue same treatment: insurance no longer covering semglee, change to same dose of glargine.     [] Will modify dose/ treatment if needed based upon lab results today.     [] Change:       Diet: low carbohydrate, low sugar.     ADA Website for diabetic food choices.   https://diabetes.org/food-nutrition/eating-healthy    The American Diabetes Association recommends an A1C of less than 7%.  A1C Average Levels Blood Sugar:   6%  126 mg/dL  7%  154 mg/dL  8%  183 mg/dL  9%  212 mg/dL  10%  240 mg/dL  11%  269 mg/dL  12%  298  mg/dL    Glucose goals for many adults with diabetes  Blood sugar before meals  mg/dL  Blood sugar 1-2 hours after the start of a meal Less than 180 mg/dL A1C Less than 7%    Eye Health:   You need a diabetic eye exam yearly.   Please have a copy of the note faxed to my office.   Fax: [phone]    Foot Health:   You need a diabetic foot exam yearly.   Check your feet routinely for any wounds.   You should always check your shoes for any debris that could cause a wound.          Patient is being treated for diabetes.     Continuation Note:     [x] Patient is being seen every 6 months for diabetes   [x] CGM continues to be use to improve glycemic control   []      Lab Results   Component Value Date    HGBA1C 5.70 (H) 06/20/2025         Orders:  -     Microalbumin / Creatinine Urine Ratio - Urine, Clean Catch    2. Chronic hepatitis C without hepatic coma  Overview:  F3 fibrosis  Genotype 1b    Completed [facility] 2017     Orders:  -     US Liver; Future  -     Hepatitis C RNA, Quantitative, PCR (graph); Future    3. Mixed hyperlipidemia  Overview:  Hx smoking    Orders:  -     Comprehensive Metabolic Panel  -     Lipid Panel With LDL / HDL Ratio    4. Essential hypertension  Assessment & Plan:  Hypertension is stable and controlled  Continue current treatment regimen.  Dietary sodium restriction.  Regular aerobic exercise.  Blood pressure will be reassessed in 6 months.    Continue lisinopril 10 mg daily       5. Need for vaccination  -     Shingrix Vaccine             No orders of the defined types were placed in this encounter.            Follow Up {Instructions Charge Capture  Follow-up Communications :23}     No follow-ups on file.      Patient was given instructions and counseling regarding his condition or for health maintenance advice. Please see specific information pulled into the AVS if appropriate.    Carmen disclaimer:   Much of this encounter note is an electronic transcription/translation of  spoken language to printed text. The electronic translation of spoken language may permit erroneous, or at times, nonsensical words or phrases to be inadvertently transcribed; Although I have reviewed the note for such errors, some may still exist.     Additional Patient Counseling:       There are no Patient Instructions on file for this visit.

## 2025-07-02 NOTE — ASSESSMENT & PLAN NOTE
Your last A1C (3 month average) =   Lab Results   Component Value Date    HGBA1C 5.70 (H) 06/20/2025      Your diabetes is Controlled.    Plan    [x] Continue same treatment: insurance no longer covering semglee, change to same dose of glargine.     [] Will modify dose/ treatment if needed based upon lab results today.     [] Change:       Diet: low carbohydrate, low sugar.     ADA Website for diabetic food choices.   https://diabetes.org/food-nutrition/eating-healthy    The American Diabetes Association recommends an A1C of less than 7%.  A1C Average Levels Blood Sugar:   6%  126 mg/dL  7%  154 mg/dL  8%  183 mg/dL  9%  212 mg/dL  10%  240 mg/dL  11%  269 mg/dL  12%  298 mg/dL    Glucose goals for many adults with diabetes  Blood sugar before meals  mg/dL  Blood sugar 1-2 hours after the start of a meal Less than 180 mg/dL A1C Less than 7%    Eye Health:   You need a diabetic eye exam yearly.   Please have a copy of the note faxed to my office.   Fax: [phone]    Foot Health:   You need a diabetic foot exam yearly.   Check your feet routinely for any wounds.   You should always check your shoes for any debris that could cause a wound.          Patient is being treated for diabetes.     Continuation Note:     [x] Patient is being seen every 6 months for diabetes   [x] CGM continues to be use to improve glycemic control   []      Lab Results   Component Value Date    HGBA1C 5.70 (H) 06/20/2025

## 2025-07-15 ENCOUNTER — PATIENT MESSAGE (OUTPATIENT)
Dept: INTERNAL MEDICINE | Facility: CLINIC | Age: 55
End: 2025-07-15
Payer: COMMERCIAL

## 2025-07-15 ENCOUNTER — SPECIALTY PHARMACY (OUTPATIENT)
Dept: PHARMACY | Facility: TELEHEALTH | Age: 55
End: 2025-07-15
Payer: COMMERCIAL

## 2025-07-15 DIAGNOSIS — Z71.85 IMMUNIZATION COUNSELING: Primary | ICD-10-CM

## 2025-07-15 NOTE — PROGRESS NOTES
Specialty Pharmacy Patient Management Program  Refill Outreach     Luis was contacted today regarding refills of their medication(s).    Refill Questions      Flowsheet Row Most Recent Value   Changes to allergies? No   Changes to medications? No   New conditions or infections since last clinic visit No   Unplanned office visit, urgent care, ED, or hospital admission in the last 4 weeks  No   How does patient/caregiver feel medication is working? Very good   Financial problems or insurance changes  No   Since the previous refill, were any specialty medication doses or scheduled injections missed or delayed?  No   If yes, please provide the amount N/A   Why were doses missed? N/A   Does this patient require a clinical escalation to a pharmacist? No            Delivery Questions      Flowsheet Row Most Recent Value   Delivery method UPS   Delivery address verified with patient/caregiver? Yes   Delivery address Home   Other address preferred N/A   Number of medications in delivery 1   Medication(s) being filled and delivered Dupilumab (Dupixent)   Doses left of specialty medications 0   Copay verified? Yes   Copay amount $57.50 to be charged to Dup[facility], $0.00 for patient   Copay form of payment Credit/debit on file  [Dup[facility] [facility]]   Delivery Date Selection 07/16/25   Signature Required No                 Follow-up: 21 day(s)     Hetal Hassan, Pharmacy Technician  7/15/2025  09:13 EDT

## 2025-07-17 ENCOUNTER — LAB (OUTPATIENT)
Dept: LAB | Facility: HOSPITAL | Age: 55
End: 2025-07-17
Payer: COMMERCIAL

## 2025-07-17 ENCOUNTER — HOSPITAL ENCOUNTER (OUTPATIENT)
Dept: ULTRASOUND IMAGING | Facility: HOSPITAL | Age: 55
Discharge: HOME | End: 2025-07-17
Payer: COMMERCIAL

## 2025-07-17 DIAGNOSIS — Z71.85 IMMUNIZATION COUNSELING: ICD-10-CM

## 2025-07-17 DIAGNOSIS — E78.2 MIXED HYPERLIPIDEMIA: Primary | ICD-10-CM

## 2025-07-17 DIAGNOSIS — B18.2 CHRONIC HEPATITIS C WITHOUT HEPATIC COMA: Chronic | ICD-10-CM

## 2025-07-17 LAB
ALBUMIN SERPL-MCNC: 4.4 G/DL (ref 3.5–5.2)
ALBUMIN UR-MCNC: <1.2 MG/DL
ALBUMIN/GLOB SERPL: 1.6 G/DL
ALP SERPL-CCNC: 70 U/L (ref 39–117)
ALT SERPL W P-5'-P-CCNC: 20 U/L (ref 1–41)
ANION GAP SERPL CALCULATED.3IONS-SCNC: 12 MMOL/L (ref 5–15)
AST SERPL-CCNC: 32 U/L (ref 1–40)
BILIRUB SERPL-MCNC: 0.3 MG/DL (ref 0–1.2)
BUN SERPL-MCNC: 20 MG/DL (ref 6–20)
BUN/CREAT SERPL: 19 (ref 7–25)
CALCIUM SPEC-SCNC: 9.4 MG/DL (ref 8.6–10.5)
CHLORIDE SERPL-SCNC: 107 MMOL/L (ref 98–107)
CHOLEST SERPL-MCNC: 167 MG/DL (ref 0–200)
CO2 SERPL-SCNC: 22 MMOL/L (ref 22–29)
CREAT SERPL-MCNC: 1.05 MG/DL (ref 0.76–1.27)
CREAT UR-MCNC: 184.8 MG/DL
EGFRCR SERPLBLD CKD-EPI 2021: 85.4 ML/MIN/1.73
GLOBULIN UR ELPH-MCNC: 2.8 GM/DL
GLUCOSE SERPL-MCNC: 99 MG/DL (ref 65–99)
HDLC SERPL-MCNC: 68 MG/DL (ref 40–60)
LDLC SERPL CALC-MCNC: 87 MG/DL (ref 0–100)
LDLC/HDLC SERPL: 1.28 {RATIO}
MICROALBUMIN/CREAT UR: NORMAL MG/G{CREAT}
POTASSIUM SERPL-SCNC: 4.5 MMOL/L (ref 3.5–5.2)
PROT SERPL-MCNC: 7.2 G/DL (ref 6–8.5)
SODIUM SERPL-SCNC: 141 MMOL/L (ref 136–145)
TRIGL SERPL-MCNC: 59 MG/DL (ref 0–150)
VLDLC SERPL-MCNC: 12 MG/DL (ref 5–40)

## 2025-07-17 PROCEDURE — 82043 UR ALBUMIN QUANTITATIVE: CPT | Performed by: NURSE PRACTITIONER

## 2025-07-17 PROCEDURE — 82570 ASSAY OF URINE CREATININE: CPT | Performed by: NURSE PRACTITIONER

## 2025-07-17 PROCEDURE — 86706 HEP B SURFACE ANTIBODY: CPT

## 2025-07-17 PROCEDURE — 87522 HEPATITIS C REVRS TRNSCRPJ: CPT | Performed by: NURSE PRACTITIONER

## 2025-07-17 PROCEDURE — 76705 ECHO EXAM OF ABDOMEN: CPT

## 2025-07-17 PROCEDURE — 80053 COMPREHEN METABOLIC PANEL: CPT | Performed by: NURSE PRACTITIONER

## 2025-07-17 PROCEDURE — 80061 LIPID PANEL: CPT | Performed by: NURSE PRACTITIONER

## 2025-07-17 RX ORDER — ROSUVASTATIN CALCIUM 10 MG/1
10 TABLET, COATED ORAL NIGHTLY
Qty: 90 TABLET | Refills: 1 | Status: SHIPPED | OUTPATIENT
Start: 2025-07-17

## 2025-07-17 NOTE — TELEPHONE ENCOUNTER
I have added hepatitis b   Please call lab to verify they have the order.     His Hepatitis C was done and pending.   It will take longer to come back.     CATHY

## 2025-07-18 LAB — HBV SURFACE AB SER QL: REACTIVE

## 2025-08-05 ENCOUNTER — SPECIALTY PHARMACY (OUTPATIENT)
Dept: PHARMACY | Facility: TELEHEALTH | Age: 55
End: 2025-08-05
Payer: COMMERCIAL

## 2025-08-22 ENCOUNTER — OFFICE VISIT (OUTPATIENT)
Dept: GASTROENTEROLOGY | Facility: CLINIC | Age: 55
End: 2025-08-22
Payer: COMMERCIAL

## 2025-08-22 VITALS
DIASTOLIC BLOOD PRESSURE: 71 MMHG | BODY MASS INDEX: 28 KG/M2 | WEIGHT: 218.2 LBS | SYSTOLIC BLOOD PRESSURE: 105 MMHG | HEIGHT: 74 IN | HEART RATE: 116 BPM | TEMPERATURE: 96.9 F

## 2025-08-22 DIAGNOSIS — K74.00 HEPATIC FIBROSIS: Primary | ICD-10-CM

## 2025-08-22 PROCEDURE — 99213 OFFICE O/P EST LOW 20 MIN: CPT | Performed by: NURSE PRACTITIONER

## 2025-08-26 LAB
A2 MACROGLOB SERPL-MCNC: 352 MG/DL (ref 110–276)
ALT SERPL W P-5'-P-CCNC: 19 IU/L (ref 0–55)
APO A-I SERPL-MCNC: 157 MG/DL (ref 101–178)
BILIRUB SERPL-MCNC: 0.5 MG/DL (ref 0–1.2)
FIBROSIS SCORING:: ABNORMAL
FIBROSIS STAGE SERPL QL: ABNORMAL
GGT SERPL-CCNC: 10 IU/L (ref 0–65)
HAPTOGLOB SERPL-MCNC: 13 MG/DL (ref 29–370)
HCV AB SER QL: ABNORMAL
LABORATORY COMMENT REPORT: ABNORMAL
LIVER FIBR SCORE SERPL CALC.FIBROSURE: 0.66 (ref 0–0.21)
NECROINFLAMM ACTIVITY SCORING:: ABNORMAL
NECROINFLAMMATORY ACT GRADE SERPL QL: ABNORMAL
NECROINFLAMMATORY ACT SCORE SERPL: 0.12 (ref 0–0.17)
SERVICE CMNT-IMP: ABNORMAL
TEST PERFORMANCE INFO SPEC: ABNORMAL

## 2025-08-27 DIAGNOSIS — K74.00 HEPATIC FIBROSIS: Primary | ICD-10-CM

## 2025-08-28 ENCOUNTER — SPECIALTY PHARMACY (OUTPATIENT)
Dept: PHARMACY | Facility: TELEHEALTH | Age: 55
End: 2025-08-28
Payer: COMMERCIAL

## (undated) DEVICE — TUBING, SUCTION, 1/4" X 10', STRAIGHT: Brand: MEDLINE

## (undated) DEVICE — THE SINGLE USE ETRAP – POLYP TRAP IS USED FOR SUCTION RETRIEVAL OF ENDOSCOPICALLY REMOVED POLYPS.: Brand: ETRAP

## (undated) DEVICE — FRCP BX RADJAW4 NDL 2.8 240CM LG OG BX40

## (undated) DEVICE — SINGLE-USE BIOPSY FORCEPS: Brand: RADIAL JAW 4

## (undated) DEVICE — CANN O2 ETCO2 FITS ALL CONN CO2 SMPL A/ 7IN DISP LF

## (undated) DEVICE — BITEBLOCK OMNI BLOC

## (undated) DEVICE — SENSR O2 OXIMAX FNGR A/ 18IN NONSTR

## (undated) DEVICE — SNAR POLYP SENSATION STDOVL 27 240 BX40

## (undated) DEVICE — CANN NASL CO2 TRULINK W/O2 A/

## (undated) DEVICE — SNAR POLYP CAPTIVATOR RND STFF 2.4 240CM 10MM 1P/U

## (undated) DEVICE — ADAPT CLN BIOGUARD AIR/H2O DISP

## (undated) DEVICE — Device: Brand: DEFENDO AIR/WATER/SUCTION AND BIOPSY VALVE

## (undated) DEVICE — LN SMPL CO2 SHTRM SD STREAM W/M LUER

## (undated) DEVICE — KT ORCA ORCAPOD DISP STRL

## (undated) DEVICE — THE TORRENT IRRIGATION SCOPE CONNECTOR IS USED WITH THE TORRENT IRRIGATION TUBING TO PROVIDE IRRIGATION FLUIDS SUCH AS STERILE WATER DURING GASTROINTESTINAL ENDOSCOPIC PROCEDURES WHEN USED IN CONJUNCTION WITH AN IRRIGATION PUMP (OR ELECTROSURGICAL UNIT).: Brand: TORRENT